# Patient Record
Sex: MALE | Race: WHITE | Employment: FULL TIME | URBAN - METROPOLITAN AREA
[De-identification: names, ages, dates, MRNs, and addresses within clinical notes are randomized per-mention and may not be internally consistent; named-entity substitution may affect disease eponyms.]

---

## 2017-02-03 ENCOUNTER — TELEPHONE (OUTPATIENT)
Dept: FAMILY MEDICINE | Facility: CLINIC | Age: 37
End: 2017-02-03

## 2017-02-03 NOTE — TELEPHONE ENCOUNTER
[facility] forms placed on Dr. Delgado desk for completion.    Fabi LE, Patient Care

## 2017-04-21 DIAGNOSIS — F41.1 GAD (GENERALIZED ANXIETY DISORDER): ICD-10-CM

## 2017-04-21 NOTE — TELEPHONE ENCOUNTER
Venlafaxine     Last Written Prescription Date: 04/21/2016  Last Fill Quantity: 90, # refills: 3  Last Office Visit with [facility], [facility] or [facility] prescribing provider: 10/18/2016        BP Readings from Last 3 Encounters:   10/18/16 (!) 150/100   04/21/16 136/86   12/18/15 128/88     Pulse: (for Fetzima)  Creatinine   Date Value Ref Range Status   02/29/2016 0.89 mg/dL Final   ]    Last PHQ-9 score on record=   PHQ-9 SCORE 4/21/2016   Total Score -   Total Score 3       Jody Xiong MA

## 2017-04-21 NOTE — LETTER
[facility] [place]  [address], [address]  [place] [address]  [phone]      April 27, 2017      Josué Franco  [address] [place] [address]        Dear Josué,    We have attempted to contact you by telephone without success.  We have sent a one time refill for the medication Effexor to the pharmacy for you.  In order to continue refilling this medication, we will need you to schedule an appointment for an office visit with your Provider.  You can call us at [phone] to schedule this appointment.        Sincerely,

## 2017-04-24 RX ORDER — VENLAFAXINE HYDROCHLORIDE 150 MG/1
CAPSULE, EXTENDED RELEASE ORAL
Qty: 30 CAPSULE | Refills: 0 | Status: SHIPPED | OUTPATIENT
Start: 2017-04-24 | End: 2017-05-25 | Stop reason: ALTCHOICE

## 2017-04-24 NOTE — TELEPHONE ENCOUNTER
Patient is out of medication and would like to be able to get this medication today. Please call patient at [phone].    Thank you Pily

## 2017-04-25 NOTE — TELEPHONE ENCOUNTER
Left message asking patient to return call.    Please inform patient of message from Provider below.   Please assist in scheduling patient.

## 2017-05-23 DIAGNOSIS — F41.1 GAD (GENERALIZED ANXIETY DISORDER): ICD-10-CM

## 2017-05-23 NOTE — PROGRESS NOTES
"  SUBJECTIVE:                                                    Josué Franco is a 36 year old male who presents to clinic today for the following health issues:      Anxiety Follow-Up    Status since last visit: \"about the same\" - states is well controlled.     Other associated symptoms:None    Complicating factors:   Significant life event: No   Current substance abuse: None  Depression symptoms: No  KRISTINA-7 SCORE 12/18/2015 12/18/2015 4/21/2016   Total Score - - -   Total Score 2 2 5        GAD7     Patient has taken quantipin in the past. He reports that he had strange side effects- he was having \"weird\" thoughts. ie \"wondering if anyone would care if he blew his brains out in the parking lot.\" He stopped taking it immediately. He has also taken Xanax but states that it did not work for him- made him tired. He has not used Ativan for a few weeks. He has not been using it a lot. He needs a refill today. He has not taken wellbutrin or Prozac. He has seen a psychiatrist in the past but reports that it has been awhile. Patient reports it is 2/10 right now.     Asthma Follow-Up    Was ACT completed today?  No      Respiratory symptoms:   Cough: No   Wheezing: No   Shortness of breath: No    Use of short- acting(rescue) inhaler: as needed     Taking controlled (daily) meds as prescribed: Yes    ER/UC visits or hospital admissions since last visit: [facility]- issue with asthma, prescribed albuterol    Recent asthma triggers that patient is dealing with: smoke and change of temperture        Amount of exercise or physical activity: 1 day/week for an average of 15-30 minutes    Problems taking medications regularly: No    Medication side effects: none    Diet: regular (no restrictions)    Patient is using his CPAP without issues.     Eye(s) Problem     Onset: 4 days ago     Description:   Location: right  Pain: no- \"sore\"   Redness: YES    Accompanying Signs & Symptoms:  Discharge/mattering: no "   Swelling: YES  Visual changes: no  Fever: no  Nasal Congestion: no  Bothered by bright lights: no     History:   Trauma: no   Foreign body exposure: no     Precipitating factors:   Wearing contacts: no, glasses      Alleviating factors:  Improved by: none        Therapies Tried and outcome: none     Patient has been wearing goggles at night while sleeping with his CPAP as recommended. He woke up 3 days ago with a very swollen rihgt eye. It has gotten better but is still swollen. When it first started he could barely see. Does not believe his left eye is affected. He has not used any ointments in the past few days.        Problem list and histories reviewed & adjusted, as indicated.  Additional history: as documented    Patient Active Problem List   Diagnosis     INTERMITTENT ASTHMA      Anxiety     CARDIOVASCULAR SCREENING; LDL GOAL LESS THAN 160     Oligospermia     Testicular hypofunction     KRISTINA (generalized anxiety disorder)     BMI over 45     Essential hypertension     Past Surgical History:   Procedure Laterality Date     C REMOVAL GALLBLADDER  1997     LITHOTRIPSY  7/08    Lithotrypsy     SURGICAL HISTORY OF -       Nissen Fundiplication       Social History   Substance Use Topics     Smoking status: Never Smoker     Smokeless tobacco: Never Used     Alcohol use Yes      Comment: Very moderate     Family History   Problem Relation Age of Onset     C.A.D. Father      DIABETES No family hx of      Hypertension No family hx of          Current Outpatient Prescriptions   Medication Sig Dispense Refill     albuterol (2.5 MG/3ML) 0.083% neb solution INHALE CONTENTS OF 1 VIAL VIA NEBULIZER EVERY 4 HOURS AS NEEDED FOR WHEEZING.  2     albuterol (PROAIR HFA/PROVENTIL HFA/VENTOLIN HFA) 108 (90 BASE) MCG/ACT Inhaler Inhale 2 puffs into the lungs       LORazepam (ATIVAN) 1 MG tablet Take 1 tablet (1 mg) by mouth daily as needed for anxiety 30 tablet 0     venlafaxine (EFFEXOR-XR) 75 MG 24 hr capsule 1 po QD for two  "weeks, then take every other day, then off. 30 capsule 0     FLUoxetine (PROZAC) 20 MG capsule 20 mg PO QD for 2 weeks, then increase to 40 mg PO QD 54 capsule 0     metoprolol (TOPROL-XL) 25 MG 24 hr tablet Take 1 tablet (25 mg) by mouth daily 30 tablet 1     tobramycin (TOBREX) 0.3 % ophthalmic solution Apply 1 drop to eye every 4 hours for 7 days 1 Bottle 0     albuterol (PROAIR HFA, PROVENTIL HFA, VENTOLIN HFA) 108 (90 BASE) MCG/ACT inhaler Inhale 2 puffs into the lungs every 4 hours as needed for shortness of breath / dyspnea 1 Inhaler 5     loratadine (CLARITIN) 10 MG tablet Take 10 mg by mouth       [DISCONTINUED] venlafaxine (EFFEXOR-XR) 150 MG 24 hr capsule TAKE ONE CAPSULE BY MOUTH EVERY DAY 30 capsule 0     Allergies   Allergen Reactions     No Known Allergies        Reviewed and updated as needed this visit by clinical staff  Tobacco  Allergies  Meds  Problems  Med Hx  Surg Hx  Fam Hx  Soc Hx        Reviewed and updated as needed this visit by Provider  Allergies  Meds  Problems         ROS:  Constitutional, HEENT, cardiovascular, pulmonary, gi and gu systems are negative, except as otherwise noted.    This document serves as a record of the services and decisions personally performed by CECILIO WARE. It was created on his/her behalf by Iris Zuñiga, a trained medical scribe. The creation of this document is based on the provider's statements to the medical scribe. Iris Zuñiga, May 25, 2017 9:40 AM  OBJECTIVE:                                                    BP (!) 130/108  Pulse 88  Temp 97.3  F (36.3  C) (Temporal)  Resp 16  Ht 1.88 m (6' 2\")  Wt (!) 167.8 kg (370 lb)  BMI 47.51 kg/m2  Body mass index is 47.51 kg/(m^2).  GENERAL APPEARANCE: healthy, alert and no distress  EYES: Eyes grossly normal to inspection, PERRL. Dryness and flaking on both upper and lower lids bilaterally, conjunctiva are injected rt > lt with yellow drainage and mattering noted bilaterally, no " significant lid swelling  HENT: ear canals and TM's normal and nose and mouth without ulcers or lesions  NECK: no adenopathy, no asymmetry, masses, or scars and thyroid normal to palpation  RESP: lungs clear to auscultation - no rales, rhonchi or wheezes  CV: regular rates and rhythm, normal S1 S2, no S3 or S4 and no murmur, click or rub  LYMPHATICS: normal ant/post cervical and supraclavicular nodes  NEURO: Normal strength and tone, mentation intact and speech normal  PSYCH: mentation appears normal and affect normal/bright    Diagnostic test results:  Diagnostic Test Results:  none      ASSESSMENT/PLAN:                                                        ICD-10-CM    1. KRISTINA (generalized anxiety disorder) F41.1 venlafaxine (EFFEXOR-XR) 75 MG 24 hr capsule     FLUoxetine (PROZAC) 20 MG capsule   2. Anxiety F41.9 LORazepam (ATIVAN) 1 MG tablet     venlafaxine (EFFEXOR-XR) 75 MG 24 hr capsule     FLUoxetine (PROZAC) 20 MG capsule   3. Essential hypertension I10 metoprolol (TOPROL-XL) 25 MG 24 hr tablet     Basic metabolic panel   4. Lipid screening Z13.220 Lipid panel reflex to direct LDL   5. Acute conjunctivitis of both eyes, unspecified acute conjunctivitis type H10.33 tobramycin (TOBREX) 0.3 % ophthalmic solution       Taper off Prozac while tapering off Effexor    Taper off Effexor for 2 weeks    Continue taking Ativan    Return to the clinic if suicidal thoughts return    Blood pressure was high today    Start on blood pressure medication, Toprol    Follow up in 3 weeks to recheck    Use hydrocortisone around your eyes to help with irritation    Use eye drops-tobrex- bilaterally until swelling resolves , avoid rubbing eyes as lielky cause of infection.     Continue using inhaler as needed    Follow up with Provider - 3 weeks to recheck blood pressure       The information in this document, created by the medical scrmario Zuñiga for me, accurately reflects the services I personally performed and the  decisions made by me. I have reviewed and approved this document for accuracy prior to leaving the patient care area.    LAWSON Hernandez [facility]

## 2017-05-23 NOTE — TELEPHONE ENCOUNTER
VENLAFAXINE     Last Written Prescription Date: 4-24-17  Last Fill Quantity: 30, # refills: 0  [place] [facility] [place]:  10-28-16   Next 5 appointments (look out 90 days)     May 25, 2017  9:20 AM CDT   Office Visit with LAWSON Hicks CNP   [facility] [place] [facility]    [address], [address]  [facility] [id number] [phone]                   Last PHQ-9 score on record=   PHQ-9 SCORE 4/21/2016   Total Score -   Total Score 3

## 2017-05-24 RX ORDER — VENLAFAXINE HYDROCHLORIDE 150 MG/1
150 CAPSULE, EXTENDED RELEASE ORAL DAILY
Qty: 30 CAPSULE | Refills: 0 | OUTPATIENT
Start: 2017-05-24

## 2017-05-25 ENCOUNTER — OFFICE VISIT (OUTPATIENT)
Dept: FAMILY MEDICINE | Facility: CLINIC | Age: 37
End: 2017-05-25
Payer: COMMERCIAL

## 2017-05-25 ENCOUNTER — TELEPHONE (OUTPATIENT)
Dept: FAMILY MEDICINE | Facility: CLINIC | Age: 37
End: 2017-05-25

## 2017-05-25 VITALS
DIASTOLIC BLOOD PRESSURE: 104 MMHG | TEMPERATURE: 97.3 F | BODY MASS INDEX: 40.43 KG/M2 | SYSTOLIC BLOOD PRESSURE: 130 MMHG | HEIGHT: 74 IN | WEIGHT: 315 LBS | HEART RATE: 84 BPM | RESPIRATION RATE: 16 BRPM

## 2017-05-25 DIAGNOSIS — F41.9 ANXIETY: ICD-10-CM

## 2017-05-25 DIAGNOSIS — Z13.220 LIPID SCREENING: ICD-10-CM

## 2017-05-25 DIAGNOSIS — F41.1 GAD (GENERALIZED ANXIETY DISORDER): Primary | ICD-10-CM

## 2017-05-25 DIAGNOSIS — H10.33 ACUTE CONJUNCTIVITIS OF BOTH EYES, UNSPECIFIED ACUTE CONJUNCTIVITIS TYPE: ICD-10-CM

## 2017-05-25 DIAGNOSIS — I10 ESSENTIAL HYPERTENSION: ICD-10-CM

## 2017-05-25 LAB
ANION GAP SERPL CALCULATED.3IONS-SCNC: 10 MMOL/L (ref 3–14)
BUN SERPL-MCNC: 10 MG/DL (ref 7–30)
CALCIUM SERPL-MCNC: 9 MG/DL (ref 8.5–10.1)
CHLORIDE SERPL-SCNC: 104 MMOL/L (ref 94–109)
CHOLEST SERPL-MCNC: 145 MG/DL
CO2 SERPL-SCNC: 25 MMOL/L (ref 20–32)
CREAT SERPL-MCNC: 0.72 MG/DL (ref 0.66–1.25)
GFR SERPL CREATININE-BSD FRML MDRD: ABNORMAL ML/MIN/1.7M2
GLUCOSE SERPL-MCNC: 151 MG/DL (ref 70–99)
HDLC SERPL-MCNC: 41 MG/DL
LDLC SERPL CALC-MCNC: 74 MG/DL
NONHDLC SERPL-MCNC: 104 MG/DL
POTASSIUM SERPL-SCNC: 3.9 MMOL/L (ref 3.4–5.3)
SODIUM SERPL-SCNC: 139 MMOL/L (ref 133–144)
TRIGL SERPL-MCNC: 148 MG/DL

## 2017-05-25 PROCEDURE — 36415 COLL VENOUS BLD VENIPUNCTURE: CPT | Performed by: NURSE PRACTITIONER

## 2017-05-25 PROCEDURE — 80048 BASIC METABOLIC PNL TOTAL CA: CPT | Performed by: NURSE PRACTITIONER

## 2017-05-25 PROCEDURE — 80061 LIPID PANEL: CPT | Performed by: NURSE PRACTITIONER

## 2017-05-25 PROCEDURE — 99214 OFFICE O/P EST MOD 30 MIN: CPT | Performed by: NURSE PRACTITIONER

## 2017-05-25 RX ORDER — METOPROLOL SUCCINATE 25 MG/1
25 TABLET, EXTENDED RELEASE ORAL DAILY
Qty: 30 TABLET | Refills: 1 | Status: SHIPPED | OUTPATIENT
Start: 2017-05-25 | End: 2017-06-30

## 2017-05-25 RX ORDER — ALBUTEROL SULFATE 0.83 MG/ML
SOLUTION RESPIRATORY (INHALATION)
Refills: 2 | COMMUNITY
Start: 2017-03-19 | End: 2019-10-24

## 2017-05-25 RX ORDER — LORAZEPAM 1 MG/1
1 TABLET ORAL DAILY PRN
Qty: 30 TABLET | Refills: 0 | Status: SHIPPED | OUTPATIENT
Start: 2017-05-25 | End: 2017-11-17

## 2017-05-25 RX ORDER — LORATADINE 10 MG/1
10 TABLET ORAL DAILY PRN
COMMUNITY
End: 2021-11-04

## 2017-05-25 RX ORDER — TOBRAMYCIN 3 MG/ML
1 SOLUTION/ DROPS OPHTHALMIC EVERY 4 HOURS
Qty: 1 BOTTLE | Refills: 0 | Status: SHIPPED | OUTPATIENT
Start: 2017-05-25 | End: 2017-06-01

## 2017-05-25 RX ORDER — VENLAFAXINE HYDROCHLORIDE 150 MG/1
150 CAPSULE, EXTENDED RELEASE ORAL DAILY
Qty: 30 CAPSULE | Refills: 0 | Status: CANCELLED | OUTPATIENT
Start: 2017-05-25

## 2017-05-25 RX ORDER — VENLAFAXINE HYDROCHLORIDE 75 MG/1
CAPSULE, EXTENDED RELEASE ORAL
Qty: 30 CAPSULE | Refills: 0 | Status: SHIPPED | OUTPATIENT
Start: 2017-05-25 | End: 2017-06-30

## 2017-05-25 RX ORDER — ALBUTEROL SULFATE 90 UG/1
2 AEROSOL, METERED RESPIRATORY (INHALATION)
COMMUNITY
Start: 2017-03-19 | End: 2017-07-31

## 2017-05-25 ASSESSMENT — ANXIETY QUESTIONNAIRES
7. FEELING AFRAID AS IF SOMETHING AWFUL MIGHT HAPPEN: NOT AT ALL
5. BEING SO RESTLESS THAT IT IS HARD TO SIT STILL: NOT AT ALL
6. BECOMING EASILY ANNOYED OR IRRITABLE: NOT AT ALL
IF YOU CHECKED OFF ANY PROBLEMS ON THIS QUESTIONNAIRE, HOW DIFFICULT HAVE THESE PROBLEMS MADE IT FOR YOU TO DO YOUR WORK, TAKE CARE OF THINGS AT HOME, OR GET ALONG WITH OTHER PEOPLE: NOT DIFFICULT AT ALL
3. WORRYING TOO MUCH ABOUT DIFFERENT THINGS: SEVERAL DAYS
GAD7 TOTAL SCORE: 2
1. FEELING NERVOUS, ANXIOUS, OR ON EDGE: SEVERAL DAYS
2. NOT BEING ABLE TO STOP OR CONTROL WORRYING: NOT AT ALL

## 2017-05-25 ASSESSMENT — PAIN SCALES - GENERAL: PAINLEVEL: NO PAIN (1)

## 2017-05-25 ASSESSMENT — PATIENT HEALTH QUESTIONNAIRE - PHQ9: 5. POOR APPETITE OR OVEREATING: NOT AT ALL

## 2017-05-25 NOTE — MR AVS SNAPSHOT
After Visit Summary   5/25/2017    Josué rFanco    MRN: [id number]           Patient Information     Date Of Birth          1980        Visit Information        Provider Department      5/25/2017 9:20 AM Belkis Chadwick APRN CNP [facility] [place]        Today's Diagnoses     KRISTINA (generalized anxiety disorder)    -  1    Anxiety        Essential hypertension        Lipid screening        Acute conjunctivitis of both eyes, unspecified acute conjunctivitis type          Care Instructions    Taper off Effexor- continue taking for 2 weeks, then decrease to every other day, then stop taking completely  Taper on Prozac- begin taking 20 mg and work up to 40 mg  Continue taking Ativan.   Begin taking blood pressure medication- Toprol. Return to check blood pressure in 3 weeks.     All medications can be taken at the same time in the morning.     Use Tobrex eye drops in both eyes until swelling, irritation resolve. Call or return if infection has not improved.           Follow-ups after your visit        Who to contact     If you have questions or need follow up information about today's clinic visit or your schedule please contact [facility] [place] directly at [phone].  Normal or non-critical lab and imaging results will be communicated to you by [employer], letter or phone within 4 business days after the clinic has received the results. If you do not hear from us within 7 days, please contact the clinic through [employer] or phone. If you have a critical or abnormal lab result, we will notify you by phone as soon as possible.  Submit refill requests through [employer] or call your pharmacy and they will forward the refill request to us. Please allow 3 business days for your refill to be completed.          Additional Information About Your Visit        MyChart Information     [employer] lets you send messages to your doctor, view your test results, renew your prescriptions, schedule appointments and  "more. To sign up, go to [email or web address] . Click on \"Log in\" on the left side of the screen, which will take you to the Welcome page. Then click on \"Sign up Now\" on the right side of the page.     You will be asked to enter the access code listed below, as well as some personal information. Please follow the directions to create your username and password.     Your access code is: [email or web address]  Expires: 2017 10:44 AM     Your access code will  in 90 days. If you need help or a new code, please call your [facility] clinic or [phone].        Care EveryWhere ID     This is your Care EveryWhere ID. This could be used by other organizations to access your [facility] medical records  [id number]        Your Vitals Were     Pulse Temperature Respirations Height BMI (Body Mass Index)       88 97.3  F (36.3  C) (Temporal) 16 6' 2\" (1.88 m) 47.51 kg/m2        Blood Pressure from Last 3 Encounters:   17 (!) 130/108   10/18/16 (!) 150/100   16 136/86    Weight from Last 3 Encounters:   17 (!) 370 lb (167.8 kg)   10/18/16 (!) 367 lb 11.2 oz (166.8 kg)   16 (!) 357 lb 8 oz (162.2 kg)              We Performed the Following     Basic metabolic panel     Lipid panel reflex to direct LDL          Today's Medication Changes          These changes are accurate as of: 17 10:44 AM.  If you have any questions, ask your nurse or doctor.               Start taking these medicines.        Dose/Directions    FLUoxetine 20 MG capsule   Commonly known as:  PROzac   Used for:  KRISTINA (generalized anxiety disorder), Anxiety   Started by:  Belkis Chadwick APRN CNP        20 mg PO QD for 2 weeks, then increase to 40 mg PO QD   Quantity:  54 capsule   Refills:  0       metoprolol 25 MG 24 hr tablet   Commonly known as:  TOPROL-XL   Used for:  Essential hypertension   Started by:  Belkis Chadwick APRN CNP        Dose:  25 mg   Take 1 tablet (25 mg) by mouth daily   Quantity:  30 tablet   Refills:  1       " tobramycin 0.3 % ophthalmic solution   Commonly known as:  TOBREX   Used for:  Acute conjunctivitis of both eyes, unspecified acute conjunctivitis type   Started by:  Belkis Chadwick APRN CNP        Dose:  1 drop   Apply 1 drop to eye every 4 hours for 7 days   Quantity:  1 Bottle   Refills:  0         These medicines have changed or have updated prescriptions.        Dose/Directions    venlafaxine 75 MG 24 hr capsule   Commonly known as:  EFFEXOR-XR   This may have changed:  See the new instructions.   Used for:  KRISTINA (generalized anxiety disorder), Anxiety   Changed by:  Belkis Chadwick APRN CNP        1 po QD for two weeks, then take every other day, then off.   Quantity:  30 capsule   Refills:  0            Where to get your medicines      These medications were sent to [place] [address] [place] [facility] - [place] - [address]  [address] [address]     Phone:  [phone]     FLUoxetine 20 MG capsule    metoprolol 25 MG 24 hr tablet    tobramycin 0.3 % ophthalmic solution    venlafaxine 75 MG 24 hr capsule         Some of these will need a paper prescription and others can be bought over the counter.  Ask your nurse if you have questions.     Bring a paper prescription for each of these medications     LORazepam 1 MG tablet                Primary Care Provider Office Phone # Fax #    LAWSON Hicks [phone]       [facility] [address] [address] [address]        Thank you!     Thank you for choosing [facility]  for your care. Our goal is always to provide you with excellent care. Hearing back from our patients is one way we can continue to improve our services. Please take a few minutes to complete the written survey that you may receive in the mail after your visit with us. Thank you!             Your Updated Medication List - Protect others around you: Learn how to safely use, store and throw away your medicines at www.disposemymeds.org.           This list is accurate as of: 5/25/17 10:44 AM.  Always use your most recent med list.                   Brand Name Dispense Instructions for use    * albuterol 108 (90 BASE) MCG/ACT Inhaler    PROAIR HFA/PROVENTIL HFA/VENTOLIN HFA    1 Inhaler    Inhale 2 puffs into the lungs every 4 hours as needed for shortness of breath / dyspnea       * albuterol (2.5 MG/3ML) 0.083% neb solution      INHALE CONTENTS OF 1 VIAL VIA NEBULIZER EVERY 4 HOURS AS NEEDED FOR WHEEZING.       * albuterol 108 (90 BASE) MCG/ACT Inhaler    PROAIR HFA/PROVENTIL HFA/VENTOLIN HFA     Inhale 2 puffs into the lungs       FLUoxetine 20 MG capsule    PROzac    54 capsule    20 mg PO QD for 2 weeks, then increase to 40 mg PO QD       loratadine 10 MG tablet    CLARITIN     Take 10 mg by mouth       LORazepam 1 MG tablet    ATIVAN    30 tablet    Take 1 tablet (1 mg) by mouth daily as needed for anxiety       metoprolol 25 MG 24 hr tablet    TOPROL-XL    30 tablet    Take 1 tablet (25 mg) by mouth daily       tobramycin 0.3 % ophthalmic solution    TOBREX    1 Bottle    Apply 1 drop to eye every 4 hours for 7 days       venlafaxine 75 MG 24 hr capsule    EFFEXOR-XR    30 capsule    1 po QD for two weeks, then take every other day, then off.       * Notice:  This list has 3 medication(s) that are the same as other medications prescribed for you. Read the directions carefully, and ask your doctor or other care provider to review them with you.

## 2017-05-25 NOTE — LETTER
[facility] [place]  [address]  [place] [address]  [phone]      June 1, 2017      Josué Franco  [address] [place] [address]  [place] [address]        Dear Josué,    Lab Results   Component Value Date     05/25/2017     04/21/2016     02/29/2016     03/19/2012    GLC 92 09/04/2009      Your glucose levels are showing elevation. I am concerned you have now developed diabetes. Please make a follow-up exam appointment to further investigate this in the next 2 weeks. If left untreated this can have severe consequences, and we have made two attempts to reach you without success.   Please call to schedule, [phone]      Sincerely,          Belkis Chadwick DNP

## 2017-05-25 NOTE — NURSING NOTE
"Chief Complaint   Patient presents with     Eye Problem     Anxiety     Panel Management     MyChart, PHQ-9/KRISTINA, ACT       Initial BP (!) 130/108  Pulse 88  Temp 97.3  F (36.3  C) (Temporal)  Resp 16  Ht 6' 2\" (1.88 m)  Wt (!) 370 lb (167.8 kg)  BMI 47.51 kg/m2 Estimated body mass index is 47.51 kg/(m^2) as calculated from the following:    Height as of this encounter: 6' 2\" (1.88 m).    Weight as of this encounter: 370 lb (167.8 kg).  Medication Reconciliation: complete       David Arreola MA    "

## 2017-05-25 NOTE — TELEPHONE ENCOUNTER
LM- asking for return call.   Want to discuss glucose- elevated in Diabetic range if full fasting. Belkis Chadwick

## 2017-05-25 NOTE — PATIENT INSTRUCTIONS
Taper off Effexor- continue taking for 2 weeks, then decrease to every other day, then stop taking completely  Taper on Prozac- begin taking 20 mg and work up to 40 mg  Continue taking Ativan.   Begin taking blood pressure medication- Toprol. Return to check blood pressure in 3 weeks.     All medications can be taken at the same time in the morning.     Use Tobrex eye drops in both eyes until swelling, irritation resolve. Call or return if infection has not improved.

## 2017-05-26 ASSESSMENT — PATIENT HEALTH QUESTIONNAIRE - PHQ9: SUM OF ALL RESPONSES TO PHQ QUESTIONS 1-9: 3

## 2017-05-26 ASSESSMENT — ANXIETY QUESTIONNAIRES: GAD7 TOTAL SCORE: 2

## 2017-05-26 ASSESSMENT — ASTHMA QUESTIONNAIRES: ACT_TOTALSCORE: 23

## 2017-06-08 ENCOUNTER — TELEPHONE (OUTPATIENT)
Dept: FAMILY MEDICINE | Facility: CLINIC | Age: 37
End: 2017-06-08

## 2017-06-08 NOTE — TELEPHONE ENCOUNTER
Summary:    Patient is due/failing the following:   BP CHECK    Action needed:   Patient needs nurse only appointment.    Type of outreach:    Phone, left message for patient to call back.     Questions for provider review:    None                                                                                                                                    Oly Rowan       Chart routed to Care Team .      Panel Management Review      Patient has the following on his problem list:     Asthma review     ACT Total Scores 5/25/2017   ACT TOTAL SCORE (Goal Greater than or Equal to 20) 23   In the past 12 months, how many times did you visit the emergency room for your asthma without being admitted to the hospital? 0   In the past 12 months, how many times were you hospitalized overnight because of your asthma? 0      1. Is Asthma diagnosis on the Problem List? Yes    2. Is Asthma listed on Health Maintenance? Yes    3. Patient is due for:  none    Hypertension   Last three blood pressure readings:  BP Readings from Last 3 Encounters:   05/25/17 (!) 130/104   10/18/16 (!) 150/100   04/21/16 136/86     Blood pressure: FAILED    HTN Guidelines:  Age 18-59 BP range:  Less than 140/90  Age 60-85 with Diabetes:  Less than 140/90  Age 60-85 without Diabetes:  less than 150/90      Composite cancer screening  Chart review shows that this patient is due/due soon for the following None

## 2017-06-08 NOTE — LETTER
[facility]  [address], [address]  [place] [address]  Phone: [phone]  Fax: [phone]  June 26, 2017      Josué Franco  [address] [address] [place] [place] [address] [address]      Dear Josué,    We care about your health and have reviewed your health plan including your medical conditions, medications, and lab results.  Based on this review, it is recommended that you follow up regarding the following health topic(s):  -High Blood Pressure    We recommend you take the following action(s):  -schedule a FREE FLOAT MA-ONLY BLOOD PRESSURE APPOINTMENT within the next 1-4 weeks.     Please call us at the [facility] - [phone] (or use [employer]) to address the above recommendations.     Thank you for trusting [facility] and we appreciate the opportunity to serve you.  We look forward to supporting your healthcare needs in the future.    Healthy Regards,    Your Health Care Team  [facility]

## 2017-06-27 ENCOUNTER — TRANSFERRED RECORDS (OUTPATIENT)
Dept: HEALTH INFORMATION MANAGEMENT | Facility: CLINIC | Age: 37
End: 2017-06-27

## 2017-06-27 NOTE — PROGRESS NOTES
SUBJECTIVE:                                                    Josué Franco is a 37 year old male who presents to clinic today for the following health issues:    Hypertension Follow-up      Outpatient blood pressures are being checked at home and work.  Results are 130-140/95.    Low Salt Diet: not monitoring salt    Anxiety Follow-Up    Status since last visit: Improved     Other associated symptoms:None    Complicating factors:   Significant life event: No   Current substance abuse: None  Depression symptoms: No  KRISTINA-7 SCORE 4/21/2016 5/25/2017 6/30/2017   Total Score - - -   Total Score - - 0 (minimal anxiety)   Total Score 5 2 0       GAD7      Asthma Follow-Up    Was ACT completed today?  No      Respiratory symptoms:   Cough: No   Wheezing: No   Shortness of breath: No    Use of short- acting(rescue) inhaler: no    Taking controlled (daily) meds as prescribed: Yes    ER/UC visits or hospital admissions since last visit: none     Recent asthma triggers that patient is dealing with: None       Amount of exercise or physical activity: 1-2 days a week    Problems taking medications regularly: No    Medication side effects: none    Diet: Trying to eat better      The patient states his breathing is well and his anxiety has improved. He notes his anxiety is around a 1 currently. The patient states he is taking 40MG of fluoxetine daily and needs a refill. He states he is taking lorazepam infrequently.     He states he is sleeping well but feels he needs a check in for pressure with his CPAP. The patient denies dizziness or side effects of current medications.     The patient reports he has been following a healthy diet and beginning exercise. He denies chest pain or shortness of breath. He notes his exercise is walking and biking. The patient states his clothes are fitting him differently, but he has not lost much weight.     Problem list and histories reviewed & adjusted, as indicated.  Additional history: as  documented    Patient Active Problem List   Diagnosis     INTERMITTENT ASTHMA      Anxiety     CARDIOVASCULAR SCREENING; LDL GOAL LESS THAN 160     Oligospermia     Testicular hypofunction     KRISTINA (generalized anxiety disorder)     BMI over 45     Essential hypertension     ADRIANNA (obstructive sleep apnea)     Past Surgical History:   Procedure Laterality Date     HC REMOVAL GALLBLADDER  1997     LITHOTRIPSY  7/08    Lithotrypsy     SURGICAL HISTORY OF -       Nissen Fundiplication       Social History   Substance Use Topics     Smoking status: Never Smoker     Smokeless tobacco: Never Used     Alcohol use Yes      Comment: Very moderate     Family History   Problem Relation Age of Onset     C.A.D. Father      DIABETES No family hx of      Hypertension No family hx of          Current Outpatient Prescriptions   Medication Sig Dispense Refill     FLUoxetine (PROZAC) 40 MG capsule Take 1 capsule (40 mg) by mouth daily 90 capsule 1     amLODIPine (NORVASC) 5 MG tablet Take 1 tablet (5 mg) by mouth daily 30 tablet 1     metoprolol (TOPROL-XL) 25 MG 24 hr tablet Take 1 tablet (25 mg) by mouth daily 90 tablet 1     albuterol (2.5 MG/3ML) 0.083% neb solution INHALE CONTENTS OF 1 VIAL VIA NEBULIZER EVERY 4 HOURS AS NEEDED FOR WHEEZING.  2     albuterol (PROAIR HFA/PROVENTIL HFA/VENTOLIN HFA) 108 (90 BASE) MCG/ACT Inhaler Inhale 2 puffs into the lungs       loratadine (CLARITIN) 10 MG tablet Take 10 mg by mouth       LORazepam (ATIVAN) 1 MG tablet Take 1 tablet (1 mg) by mouth daily as needed for anxiety 30 tablet 0     albuterol (PROAIR HFA, PROVENTIL HFA, VENTOLIN HFA) 108 (90 BASE) MCG/ACT inhaler Inhale 2 puffs into the lungs every 4 hours as needed for shortness of breath / dyspnea 1 Inhaler 5     [DISCONTINUED] FLUoxetine (PROZAC) 20 MG capsule 20 mg PO QD for 2 weeks, then increase to 40 mg PO QD 54 capsule 0     [DISCONTINUED] metoprolol (TOPROL-XL) 25 MG 24 hr tablet Take 1 tablet (25 mg) by mouth daily 30 tablet 1  "      Reviewed and updated as needed this visit by clinical staff  Tobacco  Allergies  Meds  Problems  Med Hx  Surg Hx  Fam Hx  Soc Hx        Reviewed and updated as needed this visit by Provider  Allergies  Meds  Problems         ROS:  Constitutional, neuro, ENT, endocrine, pulmonary, cardiac, gastrointestinal, genitourinary, musculoskeletal, integument and psychiatric systems are negative, except as otherwise noted.    This document serves as a record of the services and decisions personally performed and made by Belkis Chadwick DNP. It was created on her behalf by Asiya Urbina, a trained medical scribe. The creation of this document is based the provider's statements to the medical scribe.  Asiya Urbina 10:32 AM June 30, 2017      OBJECTIVE:                                                    /90  Pulse 78  Temp 97.3  F (36.3  C) (Temporal)  Resp 16  Ht 6' 1.75\" (1.873 m)  Wt (!) 370 lb 14.4 oz (168.2 kg)  BMI 47.94 kg/m2  Body mass index is 47.94 kg/(m^2).  GENERAL APPEARANCE: obese, alert and no distress  HENT: nose and mouth without ulcers or lesions. Small oropharynx  NECK: no adenopathy, no asymmetry, masses, or scars and thyroid normal to palpation  RESP: lungs clear to auscultation - no rales, rhonchi or wheezes  CV: regular rates and rhythm, normal S1 S2, no S3 or S4 and no murmur, click or rub  SKIN: no suspicious lesions or rashes  NEURO: Normal strength and tone, mentation intact and speech normal  PSYCH: mentation appears normal and affect normal/bright    Diagnostic test results:  No results found for this or any previous visit (from the past 24 hour(s)).     ASSESSMENT/PLAN:                                                        ICD-10-CM    1. Essential hypertension I10 amLODIPine (NORVASC) 5 MG tablet     **Basic metabolic panel FUTURE 2mo     metoprolol (TOPROL-XL) 25 MG 24 hr tablet   2. Intermittent asthma, uncomplicated J45.20 Asthma Action Plan (AAP)   3. KRISTINA (generalized " anxiety disorder) F41.1 FLUoxetine (PROZAC) 40 MG capsule   4. ADRIANNA (obstructive sleep apnea) G47.33 SLEEP EVALUATION & MANAGEMENT REFERRAL - ADULT         Discussed hypertension, asthma, anxiety, weight management, and sleep apnea. Referral placed for sleep evaluation and management. Blood pressure rechecked. Decision made to add second medication. Order placed for amlodipine in conjunction with metoprolol. Medication direction, dosage, and side effects discussed with patient. Anxiety controlled, decision to continue with current dose of fluoxetine. Order placed for refill of fluoxetine. Medication direction, dosage, and side effects discussed with patient.    Follow up with Provider - 6 months    Follow up with: Labs and blood pressure check in 2 weeks unless having side effects     LAWSON Hernandez CNP  [facility]    The information in this document, created by a scribe for me, accurately reflects the services I personally performed and the decisions made by me. I have reviewed and approved this document for accuracy. LAWSON Dasilva, CNP, DNP.

## 2017-06-30 ENCOUNTER — OFFICE VISIT (OUTPATIENT)
Dept: FAMILY MEDICINE | Facility: CLINIC | Age: 37
End: 2017-06-30
Payer: COMMERCIAL

## 2017-06-30 VITALS
HEIGHT: 74 IN | WEIGHT: 315 LBS | HEART RATE: 78 BPM | RESPIRATION RATE: 16 BRPM | TEMPERATURE: 97.3 F | BODY MASS INDEX: 40.43 KG/M2 | SYSTOLIC BLOOD PRESSURE: 132 MMHG | DIASTOLIC BLOOD PRESSURE: 90 MMHG

## 2017-06-30 DIAGNOSIS — F41.1 GAD (GENERALIZED ANXIETY DISORDER): ICD-10-CM

## 2017-06-30 DIAGNOSIS — J45.20 INTERMITTENT ASTHMA, UNCOMPLICATED: ICD-10-CM

## 2017-06-30 DIAGNOSIS — G47.33 OSA (OBSTRUCTIVE SLEEP APNEA): ICD-10-CM

## 2017-06-30 DIAGNOSIS — I10 ESSENTIAL HYPERTENSION: Primary | ICD-10-CM

## 2017-06-30 PROCEDURE — 99214 OFFICE O/P EST MOD 30 MIN: CPT | Performed by: NURSE PRACTITIONER

## 2017-06-30 RX ORDER — FLUOXETINE 40 MG/1
40 CAPSULE ORAL DAILY
Qty: 90 CAPSULE | Refills: 1 | Status: SHIPPED | OUTPATIENT
Start: 2017-06-30 | End: 2017-11-17 | Stop reason: DRUGHIGH

## 2017-06-30 RX ORDER — AMLODIPINE BESYLATE 5 MG/1
5 TABLET ORAL DAILY
Qty: 30 TABLET | Refills: 1 | Status: SHIPPED | OUTPATIENT
Start: 2017-06-30 | End: 2017-11-17

## 2017-06-30 RX ORDER — METOPROLOL SUCCINATE 25 MG/1
25 TABLET, EXTENDED RELEASE ORAL DAILY
Qty: 90 TABLET | Refills: 1 | Status: SHIPPED | OUTPATIENT
Start: 2017-06-30 | End: 2017-11-17 | Stop reason: SINTOL

## 2017-06-30 ASSESSMENT — ANXIETY QUESTIONNAIRES
4. TROUBLE RELAXING: NOT AT ALL
GAD7 TOTAL SCORE: 0
GAD7 TOTAL SCORE: 0
6. BECOMING EASILY ANNOYED OR IRRITABLE: NOT AT ALL
1. FEELING NERVOUS, ANXIOUS, OR ON EDGE: NOT AT ALL
7. FEELING AFRAID AS IF SOMETHING AWFUL MIGHT HAPPEN: NOT AT ALL
2. NOT BEING ABLE TO STOP OR CONTROL WORRYING: NOT AT ALL
5. BEING SO RESTLESS THAT IT IS HARD TO SIT STILL: NOT AT ALL
3. WORRYING TOO MUCH ABOUT DIFFERENT THINGS: NOT AT ALL
GAD7 TOTAL SCORE: 0
7. FEELING AFRAID AS IF SOMETHING AWFUL MIGHT HAPPEN: NOT AT ALL

## 2017-06-30 ASSESSMENT — PATIENT HEALTH QUESTIONNAIRE - PHQ9
SUM OF ALL RESPONSES TO PHQ QUESTIONS 1-9: 2
10. IF YOU CHECKED OFF ANY PROBLEMS, HOW DIFFICULT HAVE THESE PROBLEMS MADE IT FOR YOU TO DO YOUR WORK, TAKE CARE OF THINGS AT HOME, OR GET ALONG WITH OTHER PEOPLE: NOT DIFFICULT AT ALL
SUM OF ALL RESPONSES TO PHQ QUESTIONS 1-9: 2

## 2017-06-30 NOTE — MR AVS SNAPSHOT
After Visit Summary   6/30/2017    Josué Franco    MRN: [id number]           Patient Information     Date Of Birth          1980        Visit Information        Provider Department      6/30/2017 10:00 AM Belkis Chadwick APRN CNP [facility]        Today's Diagnoses     Essential hypertension    -  1    Intermittent asthma, uncomplicated        KRISTINA (generalized anxiety disorder)        ADRIANNA (obstructive sleep apnea)           Follow-ups after your visit        Additional Services     SLEEP EVALUATION & MANAGEMENT REFERRAL - ADULT       Please be aware that coverage of these services is subject to the terms and limitations of your health insurance plan.  Call member services at your health plan with any benefit or coverage questions.      Please bring the following to your appointment:    >>   List of current medications   >>   This referral request   >>   Any documents/labs given to you for this referral    [facility] [phone] (Age 15 and up)                  Future tests that were ordered for you today     Open Future Orders        Priority Expected Expires Ordered    **Basic metabolic panel FUTURE 2mo Routine 8/29/2017 10/28/2017 6/30/2017    SLEEP EVALUATION & MANAGEMENT REFERRAL - ADULT Routine  6/30/2018 6/30/2017            Who to contact     If you have questions or need follow up information about today's clinic visit or your schedule please contact [facility] directly at [phone].  Normal or non-critical lab and imaging results will be communicated to you by MyChart, letter or phone within 4 business days after the clinic has received the results. If you do not hear from us within 7 days, please contact the clinic through MyChart or phone. If you have a critical or abnormal lab result, we will notify you by phone as soon as possible.  Submit refill requests through [employer] or call your pharmacy and they will forward the refill  "request to us. Please allow 3 business days for your refill to be completed.          Additional Information About Your Visit        [employer] Information     [employer] lets you send messages to your doctor, view your test results, renew your prescriptions, schedule appointments and more. To sign up, go to [email or web address] . Click on \"Log in\" on the left side of the screen, which will take you to the Welcome page. Then click on \"Sign up Now\" on the right side of the page.     You will be asked to enter the access code listed below, as well as some personal information. Please follow the directions to create your username and password.     Your access code is: [email or web address]  Expires: 2017 10:44 AM     Your access code will  in 90 days. If you need help or a new code, please call your [place] clinic or [phone].        Care EveryWhere ID     This is your Care EveryWhere ID. This could be used by other organizations to access your [place] medical records  [id number]        Your Vitals Were     Pulse Temperature Respirations Height BMI (Body Mass Index)       78 97.3  F (36.3  C) (Temporal) 16 6' 1.75\" (1.873 m) 47.94 kg/m2        Blood Pressure from Last 3 Encounters:   17 132/90   17 (!) 130/104   10/18/16 (!) 150/100    Weight from Last 3 Encounters:   17 (!) 370 lb 14.4 oz (168.2 kg)   17 (!) 370 lb (167.8 kg)   10/18/16 (!) 367 lb 11.2 oz (166.8 kg)              We Performed the Following     Asthma Action Plan (AAP)          Today's Medication Changes          These changes are accurate as of: 17 10:30 AM.  If you have any questions, ask your nurse or doctor.               Start taking these medicines.        Dose/Directions    amLODIPine 5 MG tablet   Commonly known as:  NORVASC   Used for:  Essential hypertension   Started by:  Belkis Chadwick APRN CNP        Dose:  5 mg   Take 1 tablet (5 mg) by mouth daily   Quantity:  30 tablet   Refills:  1       FLUoxetine 40 " MG capsule   Commonly known as:  PROzac   Used for:  KRISTINA (generalized anxiety disorder)   Started by:  Belkis Chadwick APRN CNP        Dose:  40 mg   Take 1 capsule (40 mg) by mouth daily   Quantity:  90 capsule   Refills:  1         Stop taking these medicines if you haven't already. Please contact your care team if you have questions.     venlafaxine 75 MG 24 hr capsule   Commonly known as:  EFFEXOR-XR   Stopped by:  Belkis Chadwick APRN CNP                Where to get your medicines      These medications were sent to [place] [address] [facility] [place] [place] - [address]  [address], [place] [address]     Phone:  [phone]     amLODIPine 5 MG tablet    FLUoxetine 40 MG capsule                Primary Care Provider Office Phone # Fax #    LAWSON Hicks [phone]       [facility] [address] [facility] [address]        Equal Access to Services     [facility] Hospital[facility]INE : Hadii aad ku hadasho Soomaali, waaxda luqadaha, qaybta kaalmada adeegyada, waxay idiin hayaan dolores ross . So [facility] [phone].    ATENCIÓN: Si holgerla español, tiene a gaxiola disposición servicios gratuitos de asistencia lingüística. Llame al [phone].    We comply with applicable federal civil rights laws and [place] laws. We do not discriminate on the basis of race, color, national origin, age, disability sex, sexual orientation or gender identity.            Thank you!     Thank you for choosing [facility]  for your care. Our goal is always to provide you with excellent care. Hearing back from our patients is one way we can continue to improve our services. Please take a few minutes to complete the written survey that you may receive in the mail after your visit with us. Thank you!             Your Updated Medication List - Protect others around you: Learn how to safely use, store and throw away your medicines at www.disposemymeds.org.          This list is accurate as of: 6/30/17  10:30 AM.  Always use your most recent med list.                   Brand Name Dispense Instructions for use Diagnosis    * albuterol 108 (90 BASE) MCG/ACT Inhaler    PROAIR HFA/PROVENTIL HFA/VENTOLIN HFA    1 Inhaler    Inhale 2 puffs into the lungs every 4 hours as needed for shortness of breath / dyspnea    Intermittent asthma, uncomplicated       * albuterol (2.5 MG/3ML) 0.083% neb solution      INHALE CONTENTS OF 1 VIAL VIA NEBULIZER EVERY 4 HOURS AS NEEDED FOR WHEEZING.        * albuterol 108 (90 BASE) MCG/ACT Inhaler    PROAIR HFA/PROVENTIL HFA/VENTOLIN HFA     Inhale 2 puffs into the lungs        amLODIPine 5 MG tablet    NORVASC    30 tablet    Take 1 tablet (5 mg) by mouth daily    Essential hypertension       FLUoxetine 40 MG capsule    PROzac    90 capsule    Take 1 capsule (40 mg) by mouth daily    KRISTINA (generalized anxiety disorder)       loratadine 10 MG tablet    CLARITIN     Take 10 mg by mouth        LORazepam 1 MG tablet    ATIVAN    30 tablet    Take 1 tablet (1 mg) by mouth daily as needed for anxiety    Anxiety       metoprolol 25 MG 24 hr tablet    TOPROL-XL    30 tablet    Take 1 tablet (25 mg) by mouth daily    Essential hypertension       * Notice:  This list has 3 medication(s) that are the same as other medications prescribed for you. Read the directions carefully, and ask your doctor or other care provider to review them with you.

## 2017-06-30 NOTE — NURSING NOTE
"Chief Complaint   Patient presents with     RECHECK     Panel Management     KRISTINA Michele, KRISTINE       Initial /90  Pulse 78  Temp 97.3  F (36.3  C) (Temporal)  Resp 16  Ht 6' 1.75\" (1.873 m)  Wt (!) 370 lb 14.4 oz (168.2 kg)  BMI 47.94 kg/m2 Estimated body mass index is 47.94 kg/(m^2) as calculated from the following:    Height as of this encounter: 6' 1.75\" (1.873 m).    Weight as of this encounter: 370 lb 14.4 oz (168.2 kg).  Medication Reconciliation: complete     Lynne Phan, JOSH  June 30, 2017      "

## 2017-06-30 NOTE — LETTER
My Asthma Action Plan  Name: Josué Franco   YOB: 1980  Date: 6/30/2017   My doctor: LAWSON Hernandez CNP   My clinic: [facility]        My Control Medicine: none  My Rescue Medicine: albuterol as directed   My Asthma Severity: intermittent  Avoid your asthma triggers:   None            GREEN ZONE   Good Control    I feel good    No cough or wheeze    Can work, sleep and play without asthma symptoms       Take your asthma control medicine every day.     1. If exercise triggers your asthma, take your rescue medication    15 minutes before exercise or sports, and    During exercise if you have asthma symptoms  2. Spacer to use with inhaler: If you have a spacer, make sure to use it with your inhaler             YELLOW ZONE Getting Worse  I have ANY of these:    I do not feel good    Cough or wheeze    Chest feels tight    Wake up at night   1. Keep taking your Green Zone medications  2. Start taking your rescue medicine:    every 20 minutes for up to 1 hour. Then every 4 hours for 24-48 hours.  3. If you stay in the Yellow Zone for more than 12-24 hours, contact your doctor.  4. If you do not return to the Green Zone in 12-24 hours or you get worse, start taking your oral steroid medicine if prescribed by your provider.           RED ZONE Medical Alert - Get Help  I have ANY of these:    I feel awful    Medicine is not helping    Breathing getting harder    Trouble walking or talking    Nose opens wide to breathe       1. Take your rescue medicine NOW  2. If your provider has prescribed an oral steroid medicine, start taking it NOW  3. Call your doctor NOW  4. If you are still in the Red Zone after 20 minutes and you have not reached your doctor:    Take your rescue medicine again and    Call 911 or go to the emergency room right away    See your regular doctor within 2 weeks of an Emergency Room or Urgent Care visit for follow-up treatment.        Electronically signed by: Belkis GONSALVES  Farrukh, June 30, 2017    Annual Reminders:  Meet with Asthma Educator,  Flu Shot in the Fall, consider Pneumonia Vaccination for patients with asthma (aged 19 and older).    Pharmacy:    [facility] [place] - [address]  [facility] [address] [facility] [employer] - [place] - [address]                    Asthma Triggers  How To Control Things That Make Your Asthma Worse    Triggers are things that make your asthma worse.  Look at the list below to help you find your triggers and what you can do about them.  You can help prevent asthma flare-ups by staying away from your triggers.      Trigger                                                          What you can do   Cigarette Smoke  Tobacco smoke can make asthma worse. Do not allow smoking in your home, car or around you.  Be sure no one smokes at a child s day care or school.  If you smoke, ask your health care provider for ways to help you quit.  Ask family members to quit too.  Ask your health care provider for a referral to Quit Plan to help you quit smoking, or call 4-603-708-PLAN.     Colds, Flu, Bronchitis  These are common triggers of asthma. Wash your hands often.  Don t touch your eyes, nose or mouth.  Get a flu shot every year.     Dust Mites  These are tiny bugs that live in cloth or carpet. They are too small to see. Wash sheets and blankets in hot water every week.   Encase pillows and mattress in dust mite proof covers.  Avoid having carpet if you can. If you have carpet, vacuum weekly.   Use a dust mask and HEPA vacuum.   Pollen and Outdoor Mold  Some people are allergic to trees, grass, or weed pollen, or molds. Try to keep your windows closed.  Limit time out doors when pollen count is high.   Ask you health care provider about taking medicine during allergy season.     Animal Dander  Some people are allergic to skin flakes, urine or saliva from pets with fur or feathers. Keep pets with fur or feathers out of your home.    If  you can t keep the pet outdoors, then keep the pet out of your bedroom.  Keep the bedroom door closed.  Keep pets off cloth furniture and away from stuffed toys.     Mice, Rats, and Cockroaches  Some people are allergic to the waste from these pests.   Cover food and garbage.  Clean up spills and food crumbs.  Store grease in the refrigerator.   Keep food out of the bedroom.   Indoor Mold  This can be a trigger if your home has high moisture. Fix leaking faucets, pipes, or other sources of water.   Clean moldy surfaces.  Dehumidify basement if it is damp and smelly.   Smoke, Strong Odors, and Sprays  These can reduce air quality. Stay away from strong odors and sprays, such as perfume, powder, hair spray, paints, smoke incense, paint, cleaning products, candles and new carpet.   Exercise or Sports  Some people with asthma have this trigger. Be active!  Ask your doctor about taking medicine before sports or exercise to prevent symptoms.    Warm up for 5-10 minutes before and after sports or exercise.     Other Triggers of Asthma  Cold air:  Cover your nose and mouth with a scarf.  Sometimes laughing or crying can be a trigger.  Some medicines and food can trigger asthma.

## 2017-07-01 ASSESSMENT — PATIENT HEALTH QUESTIONNAIRE - PHQ9: SUM OF ALL RESPONSES TO PHQ QUESTIONS 1-9: 2

## 2017-07-01 ASSESSMENT — ANXIETY QUESTIONNAIRES: GAD7 TOTAL SCORE: 0

## 2017-07-20 PROBLEM — I10 ESSENTIAL HYPERTENSION: Chronic | Status: ACTIVE | Noted: 2017-05-25

## 2017-07-31 ENCOUNTER — OFFICE VISIT (OUTPATIENT)
Dept: SLEEP MEDICINE | Facility: CLINIC | Age: 37
End: 2017-07-31
Attending: NURSE PRACTITIONER
Payer: COMMERCIAL

## 2017-07-31 VITALS
HEIGHT: 75 IN | HEART RATE: 74 BPM | DIASTOLIC BLOOD PRESSURE: 88 MMHG | BODY MASS INDEX: 39.17 KG/M2 | WEIGHT: 315 LBS | OXYGEN SATURATION: 95 % | SYSTOLIC BLOOD PRESSURE: 137 MMHG

## 2017-07-31 DIAGNOSIS — R53.83 FATIGUE, UNSPECIFIED TYPE: ICD-10-CM

## 2017-07-31 DIAGNOSIS — G47.33 OSA (OBSTRUCTIVE SLEEP APNEA): Primary | ICD-10-CM

## 2017-07-31 DIAGNOSIS — E66.01 MORBID OBESITY DUE TO EXCESS CALORIES (H): Chronic | ICD-10-CM

## 2017-07-31 PROCEDURE — 99243 OFF/OP CNSLTJ NEW/EST LOW 30: CPT | Performed by: INTERNAL MEDICINE

## 2017-07-31 NOTE — PATIENT INSTRUCTIONS

## 2017-07-31 NOTE — PROGRESS NOTES
Sleep Consultation:    Date on this visit: 7/31/2017    Josué Franco is sent by Belkis Chadwick for a sleep consultation regarding obstructive sleep apnea .    Primary Physician: Belkis Chadwick     Chief Complaint   Patient presents with     Sleep Problem     Consult - always tired, CPAP not helping to get restful sleep - also causing droopy eye lid syndrome     He was initially diagnosed with severe  obstructive sleep apnea at [facility] in 2006. He states he presented with excessive daytime sleepiness, apneas, gasping for air.  He has been on CPAP since then.     He is better but reports he still feels tired during the day. He states from rubbing his face at night, trying to take his mask off, and mask is leaking. He wears goggles with his cpap due to this. Patient sleeps on his side and stomach. New masks work better, but still has problems.     He uses [facility]. Hey have not tried different masks.     Overall, he rates the experience with PAP as 5 (0 poor, 10 great). The mask is comfortable.    The mask is leaking.  He is snoring at times with the mask on. He is not having gasp arousals.  He is not having significant oral/nasal dryness. The pressure is comfortable.     His PAP interface is Full Face Mask.    Bedtime is typically 2230. Usually it takes about 10 min minutes to fall asleep with the mask on. Wake time is typically 0800.  Patient is using PAP therapy 9 hours per night. The patient is usually getting 6.5 hours of sleep per night.    He does not feel rested in the morning.    Total score - [place]: 3 (7/31/2017  7:00 AM)    [employer]   CPAP 11.2 cmH2O download:  30 total days of use. 0 nonuse days. 30 days with >4 hours use.  Average use 9 hours 22 minutes per day. Median Leak 10.8 L/min. 95%ile Leak 43 L/min. AHI 5.7.       Josué goes to bed at 10:30 PM during the week. He gets up at 8:00 AM with an alarm. He falls asleep in 15 minutes.  Josué denies difficulty falling asleep.  He wakes up 4-5  times a night for 5 minutes before falling back to sleep.  Josué wakes up to go to the bathroom and mask leaking.  On weekends, Josué goes to bed at 11:00 PM.  He gets up at 7:30 AM with an alarm.     Patient does not use electronics in bed and watch TV in bed.     Patient does have a regular bed partner. He has occasional morning headaches, and has nocturnal leg kicking, but denies morning confusions or restless legs.     oJsué has occasional arm or leg movements 'during dreams' and rare sleep paralysis. He denies any sleep walking, sleep talking, cataplexy and hypnogogic/hypnopompic hallucinations.    Josué has gained 0-5 pounds in 10 years.  Patient describes themself as a night person. Patient's [place] Sleepiness score 3/24 inconsistent with severe daytime sleepiness.  He has more fatigue.     Josué naps 1 times per week for on CPAP minutes. He takes no inadvertant naps.  He denies dozing while driving < 2hours since starting CPAP.  He uses 1 sodas/day.     He takes ativan 1/month.     Lab Results   Component Value Date    TSH 2.27 04/21/2016     CBC RESULTS:   Recent Labs   Lab Test  03/19/12   0937  09/04/09   1554   WBC   --   11.5*   RBC   --   4.92   HGB  13.7  15.1   HCT   --   43.8   MCV   --   89   MCH   --   30.6   MCHC   --   34.4   RDW   --   12.4   PLT   --   294     Recent Labs   Lab Test  05/25/17   1006  04/21/16   1149 02/29/16 09/04/09   1554   NA  139   --   132   --   141   POTASSIUM  3.9   --   3.7   --   4.4   CHLORIDE  104   --   97   --   104   CO2  25   --    --    --   26   ANIONGAP  10   --   9.0   --   11   GLC  151*  112*  145*   < >  92   BUN  10   --   11   --   18   CR  0.72   --   0.89   < >  1.00   BRAD  9.0   --   7.8   --   9.5    < > = values in this interval not displayed.     Liver Function Studies -   Recent Labs   Lab Test  09/04/09   1554   PROTTOTAL  8.1   ALBUMIN  4.4   BILITOTAL  0.3   ALKPHOS  102   AST  26   ALT  20          Allergies:    Allergies   Allergen  Reactions     No Known Allergies        Medications:    Current Outpatient Prescriptions   Medication Sig Dispense Refill     FLUoxetine (PROZAC) 40 MG capsule Take 1 capsule (40 mg) by mouth daily 90 capsule 1     amLODIPine (NORVASC) 5 MG tablet Take 1 tablet (5 mg) by mouth daily 30 tablet 1     metoprolol (TOPROL-XL) 25 MG 24 hr tablet Take 1 tablet (25 mg) by mouth daily 90 tablet 1     albuterol (2.5 MG/3ML) 0.083% neb solution INHALE CONTENTS OF 1 VIAL VIA NEBULIZER EVERY 4 HOURS AS NEEDED FOR WHEEZING.  2     loratadine (CLARITIN) 10 MG tablet Take 10 mg by mouth       LORazepam (ATIVAN) 1 MG tablet Take 1 tablet (1 mg) by mouth daily as needed for anxiety 30 tablet 0     albuterol (PROAIR HFA, PROVENTIL HFA, VENTOLIN HFA) 108 (90 BASE) MCG/ACT inhaler Inhale 2 puffs into the lungs every 4 hours as needed for shortness of breath / dyspnea 1 Inhaler 5     [DISCONTINUED] albuterol (PROAIR HFA/PROVENTIL HFA/VENTOLIN HFA) 108 (90 BASE) MCG/ACT Inhaler Inhale 2 puffs into the lungs         Problem List:  Patient Active Problem List    Diagnosis Date Noted     ADRIANNA (obstructive sleep apnea) 06/30/2017     Priority: Medium     Essential hypertension 05/25/2017     Priority: Medium     BMI over 45 07/22/2016     Priority: Medium     KRISTINA (generalized anxiety disorder) 06/03/2013     Priority: Medium     INTERMITTENT ASTHMA  04/16/2010     Priority: Medium     Oligospermia 03/19/2012     Priority: Low     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     Priority: Low     CARDIOVASCULAR SCREENING; LDL GOAL LESS THAN 160 10/31/2010     Priority: Low        Past Medical/Surgical History:  Past Medical History:   Diagnosis Date     Nephrolithiasis      Past Surgical History:   Procedure Laterality Date     CHOLECYSTECTOMY  1997     LITHOTRIPSY  7/08    Lithotrypsy     NISSEN FUNDOPLICATION  2001    Nissen Fundiplication       Social History:  Social History     Social History     Marital status: Single     Spouse name: N/A      Number of children: 0     Years of education: N/A     Occupational History     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 Yes     FATHER, ANGIOPLASTY AGE 52     Social History Narrative       Family History:  Family History   Problem Relation Age of Onset     C.A.D. Father      DIABETES No family hx of      Hypertension No family hx of        Review of Systems:  A complete review of systems reviewed by me is negative with the exeption of what has been mentioned in the history of present illness.  CONSTITUTIONAL: NEGATIVE for weight gain/loss, fever, chills, sweats or night sweats, drug allergies.  EYES: NEGATIVE for changes in vision, blind spots, double vision.  ENT: NEGATIVE for ear pain, sore throat, sinus pain, post-nasal drip, runny nose, bloody nose  CARDIAC: NEGATIVE for fast heartbeats or fluttering in chest, chest pain or pressure, breathlessness when lying flat, swollen legs or swollen feet.  NEUROLOGIC:  POSITIVE for  headaches  DERMATOLOGIC: NEGATIVE for rashes, new moles or change in mole(s)  PULMONARY: NEGATIVE SOB at rest, SOB with activity, dry cough, productive cough, coughing up blood, wheezing or whistling when breathing.    GASTROINTESTINAL: NEGATIVE for nausea or vomitting, loose or watery stools, fat or grease in stools, constipation, abdominal pain, bowel movements black in color or blood noted.  GENITOURINARY: NEGATIVE for pain during urination, blood in urine, urinating more frequently than usual, irregular menstrual periods.  MUSCULOSKELETAL:  POSITIVE for  muscle pain, bone or joint pain and swollen joints  ENDOCRINE: NEGATIVE for increased thirst or urination,  "diabetes.  LYMPHATIC: NEGATIVE for swollen lymph nodes, lumps or bumps in the breasts or nipple discharge.    Physical Examination:  Vitals: /88  Pulse 74  Ht 1.905 m (6' 3\")  Wt (!) 169.2 kg (373 lb)  SpO2 95%  BMI 46.62 kg/m2  BMI= Body mass index is 46.62 kg/(m^2).         [place] Total Score 7/31/2017   Total score - [place] 3       GENERAL APPEARANCE: alert and no distress  EYES: Eyes grossly normal to inspection and conjunctivae and sclerae normal  HENT: ear canals and TM's normal, nose and mouth without ulcers or lesions and oropharynx crowded  NECK: no adenopathy, no asymmetry, masses, or scars and thyroid normal to palpation  RESP: lungs clear to auscultation - no rales, rhonchi or wheezes  CV: regular rates and rhythm, normal S1 S2, no S3 or S4 and no murmur, click or rub  ABDOMEN: soft, nontender, without hepatosplenomegaly or masses and bowel sounds normal  MS: extremities normal- no gross deformities noted  NEURO: Normal strength and tone, mentation intact, speech normal and cranial nerves 2-12 intact  PSYCH: mentation appears normal and affect normal/bright  Mallampati Class: IV.  Tonsillar Stage: 1  hidden by pillars.    Impression/Plan:    Severe obstructive sleep apnea by sleep study 10/2006. 60# weight gain since then with persistent fatigue/unrefreshing sleep (ESS 3). Having mask issues.  -See DME about mask fit  -Consider Polysomnogram (using 4% desaturation/Medicare/2012 AASM 1B scoring rules) with Transcutaneous CO2 Monitoring (TCM), all night titration.   -check cbc    Obstructive sleep apnea reviewed.  Complications of untreated sleep apnea were reviewed.    Manjit Sotelo     CC: Belkis Chadwick        "

## 2017-07-31 NOTE — NURSING NOTE
"Chief Complaint   Patient presents with     Sleep Problem     Consult - always tired, CPAP not helping to get restful sleep - also causing droopy eye lid syndrome       Initial /88  Pulse 74  Ht 1.905 m (6' 3\")  Wt (!) 169.2 kg (373 lb)  SpO2 95%  BMI 46.62 kg/m2 Estimated body mass index is 46.62 kg/(m^2) as calculated from the following:    Height as of this encounter: 1.905 m (6' 3\").    Weight as of this encounter: 169.2 kg (373 lb).  Medication Reconciliation: complete   Neck Cir (cm): 54 cm (7/31/2017  7:30 AM)  ESS 3    Lisa Carpio CMA      "

## 2017-07-31 NOTE — MR AVS SNAPSHOT
After Visit Summary   7/31/2017    Josué Franco    MRN: [id number]           Patient Information     Date Of Birth          1980        Visit Information        Provider Department      7/31/2017 7:00 AM Manjit Sotelo MD [facility]        Today's Diagnoses     ADRIANNA (obstructive sleep apnea)    -  1    Fatigue, unspecified type          Care Instructions      Your BMI is Body mass index is 46.62 kg/(m^2).  Weight management is a personal decision.  If you are interested in exploring weight loss strategies, the following discussion covers the approaches that may be successful. Body mass index (BMI) is one way to tell whether you are at a healthy weight, overweight, or obese. It measures your weight in relation to your height.  A BMI of 18.5 to 24.9 is in the healthy range. A person with a BMI of 25 to 29.9 is considered overweight, and someone with a BMI of 30 or greater is considered obese. More than two-thirds of American adults are considered overweight or obese.  Being overweight or obese increases the risk for further weight gain. Excess weight may lead to heart disease and diabetes.  Creating and following plans for healthy eating and physical activity may help you improve your health.  Weight control is part of healthy lifestyle and includes exercise, emotional health, and healthy eating habits. Careful eating habits lifelong are the mainstay of weight control. Though there are significant health benefits from weight loss, long-term weight loss with diet alone may be very difficult to achieve- studies show long-term success with dietary management in less than 10% of people. Attaining a healthy weight may be especially difficult to achieve in those with severe obesity. In some cases, medications, devices and surgical management might be considered.  What can you do?  If you are overweight or obese and are interested in methods for weight loss, you should discuss this with your  provider.     Consider reducing daily calorie intake by 500 calories.     Keep a food journal.     Avoiding skipping meals, consider cutting portions instead.    Diet combined with exercise helps maintain muscle while optimizing fat loss. Strength training is particularly important for building and maintaining muscle mass. Exercise helps reduce stress, increase energy, and improves fitness. Increasing exercise without diet control, however, may not burn enough calories to loose weight.       Start walking three days a week 10-20 minutes at a time    Work towards walking thirty minutes five days a week     Eventually, increase the speed of your walking for 1-2 minutes at time    In addition, we recommend that you review healthy lifestyles and methods for weight loss available through the National Institutes of Health patient information sites:  http://win.niddk.nih.gov/publications/index.htm    And look into health and wellness programs that may be available through your health insurance provider, employer, local community center, or nabila club.    Weight management plan: Patient was referred to their PCP to discuss a diet and exercise plan.              Follow-ups after your visit        Follow-up notes from your care team     Return in about 2 years (around 7/31/2019) for Routine Visit.      Future tests that were ordered for you today     Open Future Orders        Priority Expected Expires Ordered    CBC with platelets differential Routine  8/31/2017 7/31/2017            Who to contact     If you have questions or need follow up information about today's clinic visit or your schedule please contact [facility] directly at [phone].  Normal or non-critical lab and imaging results will be communicated to you by MyChart, letter or phone within 4 business days after the clinic has received the results. If you do not hear from us within 7 days, please contact the clinic through MyChart or phone. If  "you have a critical or abnormal lab result, we will notify you by phone as soon as possible.  Submit refill requests through [employer] or call your pharmacy and they will forward the refill request to us. Please allow 3 business days for your refill to be completed.          Additional Information About Your Visit        [employer] Information     [employer] lets you send messages to your doctor, view your test results, renew your prescriptions, schedule appointments and more. To sign up, go to [email or web address] . Click on \"Log in\" on the left side of the screen, which will take you to the Welcome page. Then click on \"Sign up Now\" on the right side of the page.     You will be asked to enter the access code listed below, as well as some personal information. Please follow the directions to create your username and password.     Your access code is: [email or web address]  Expires: 2017 10:44 AM     Your access code will  in 90 days. If you need help or a new code, please call your [place] clinic or [phone].        Care EveryWhere ID     This is your Care EveryWhere ID. This could be used by other organizations to access your [place] medical records  [id number]        Your Vitals Were     Pulse Height Pulse Oximetry BMI (Body Mass Index)          74 1.905 m (6' 3\") 95% 46.62 kg/m2         Blood Pressure from Last 3 Encounters:   17 137/88   17 132/90   17 (!) 130/104    Weight from Last 3 Encounters:   17 (!) 169.2 kg (373 lb)   17 (!) 168.2 kg (370 lb 14.4 oz)   17 (!) 167.8 kg (370 lb)              We Performed the Following     Comprehensive DME     SLEEP EVALUATION & MANAGEMENT REFERRAL - ADULT          Today's Medication Changes          These changes are accurate as of: 17  8:17 AM.  If you have any questions, ask your nurse or doctor.               Start taking these medicines.        Dose/Directions    order for DME   Used for:  ADRIANNA (obstructive sleep apnea) "   Started by:  Manjit Sotelo MD        CPAP 11 cm   Quantity:  1 Units   Refills:  1            Where to get your medicines      Information about where to get these medications is not yet available     ! Ask your nurse or doctor about these medications     order for DME                Primary Care Provider Office Phone # Fax #    LAWSON Hicks EYAD [phone]       [facility] [address] [facility] [address]        Equal Access to Services     RUDDY LUZ : Hadii aad ku hadasho Soomaali, waaxda luqadaha, qaybta kaalmada adeegyada, waxay idiin hayaan adeeg kharash la'alvaron . So [facility] [phone].    ATENCIÓN: Si habla español, tiene a gaxiola disposición servicios gratuitos de asistencia lingüística. Bellaame al [phone].    We comply with applicable federal civil rights laws and [place] laws. We do not discriminate on the basis of race, color, national origin, age, disability sex, sexual orientation or gender identity.            Thank you!     Thank you for choosing [facility]  for your care. Our goal is always to provide you with excellent care. Hearing back from our patients is one way we can continue to improve our services. Please take a few minutes to complete the written survey that you may receive in the mail after your visit with us. Thank you!             Your Updated Medication List - Protect others around you: Learn how to safely use, store and throw away your medicines at www.disposemymeds.org.          This list is accurate as of: 7/31/17  8:17 AM.  Always use your most recent med list.                   Brand Name Dispense Instructions for use Diagnosis    * albuterol 108 (90 BASE) MCG/ACT Inhaler    PROAIR HFA/PROVENTIL HFA/VENTOLIN HFA    1 Inhaler    Inhale 2 puffs into the lungs every 4 hours as needed for shortness of breath / dyspnea    Intermittent asthma, uncomplicated       * albuterol (2.5 MG/3ML) 0.083% neb solution      INHALE CONTENTS OF  1 VIAL VIA NEBULIZER EVERY 4 HOURS AS NEEDED FOR WHEEZING.        amLODIPine 5 MG tablet    NORVASC    30 tablet    Take 1 tablet (5 mg) by mouth daily    Essential hypertension       FLUoxetine 40 MG capsule    PROzac    90 capsule    Take 1 capsule (40 mg) by mouth daily    KRISTINA (generalized anxiety disorder)       loratadine 10 MG tablet    CLARITIN     Take 10 mg by mouth        LORazepam 1 MG tablet    ATIVAN    30 tablet    Take 1 tablet (1 mg) by mouth daily as needed for anxiety    Anxiety       metoprolol 25 MG 24 hr tablet    TOPROL-XL    90 tablet    Take 1 tablet (25 mg) by mouth daily    Essential hypertension       order for DME     1 Units    CPAP 11 cm    ADRIANNA (obstructive sleep apnea)       * Notice:  This list has 2 medication(s) that are the same as other medications prescribed for you. Read the directions carefully, and ask your doctor or other care provider to review them with you.

## 2017-08-02 DIAGNOSIS — G47.33 OSA (OBSTRUCTIVE SLEEP APNEA): Primary | ICD-10-CM

## 2017-09-08 ENCOUNTER — TELEPHONE (OUTPATIENT)
Dept: FAMILY MEDICINE | Facility: CLINIC | Age: 37
End: 2017-09-08

## 2017-09-08 NOTE — TELEPHONE ENCOUNTER
Summary:    Patient is due/failing the following:   BP check     Action needed:   Patient needs nurse only appointment.    Type of outreach:    Phone, left message for patient to call back.     Questions for provider review:    None                                                                                                                                    Oly Rowan       Chart routed to Care Team .    Panel Management Review      Patient has the following on his problem list:     Asthma review     ACT Total Scores 5/25/2017   ACT TOTAL SCORE -   ASTHMA ER VISITS -   ASTHMA HOSPITALIZATIONS -   ACT TOTAL SCORE (Goal Greater than or Equal to 20) 23   In the past 12 months, how many times did you visit the emergency room for your asthma without being admitted to the hospital? 0   In the past 12 months, how many times were you hospitalized overnight because of your asthma? 0      1. Is Asthma diagnosis on the Problem List? Yes    2. Is Asthma listed on Health Maintenance? Yes    3. Patient is due for: none  Hypertension   Last three blood pressure readings:  BP Readings from Last 3 Encounters:   07/31/17 137/88   06/30/17 132/90   05/25/17 (!) 130/104     Blood pressure: Passed    HTN Guidelines:  Age 18-59 BP range:  Less than 140/90  Age 60-85 with Diabetes:  Less than 140/90  Age 60-85 without Diabetes:  less than 150/90          Composite cancer screening  Chart review shows that this patient is due/due soon for the following None

## 2017-11-14 DIAGNOSIS — F41.9 ANXIETY: ICD-10-CM

## 2017-11-15 RX ORDER — LORAZEPAM 1 MG/1
TABLET ORAL
Qty: 30 TABLET | Refills: 0 | OUTPATIENT
Start: 2017-11-15

## 2017-11-16 RX ORDER — LORAZEPAM 1 MG/1
1 TABLET ORAL DAILY PRN
Qty: 30 TABLET | Refills: 0 | OUTPATIENT
Start: 2017-11-16

## 2017-11-16 NOTE — TELEPHONE ENCOUNTER
Pt called back to ask why the ativan medication refill was denied.  He had an appt in June 2017 and was told to f/u in 6 months, which is December.    Provider, can pt get another refill before then?

## 2017-11-16 NOTE — PROGRESS NOTES
SUBJECTIVE:                                                    Josué Franco is a 37 year old male who presents to clinic today for the following health issues:    History of Present Illness     Depression & Anxiety Follow-up:     Depression/Anxiety:  Anxiety only    Status since last visit::  Stable    Other associated symptoms of anxiety::  None    Significant life event::  No    Current substance use::  None    Depression symptoms::  None    Diet:  Regular (no restrictions)  Frequency of exercise:  1 day/week  Duration of exercise:  15-30 minutes  Taking medications regularly:  Yes  Medication side effects:  Not applicable  Additional concerns today:  YES    Joint Pain - Left shoulder pain    Onset: off and on for 1 year, getting worse within the last month    Description:   Location: left shoulder  Character: Sharp and Constant    Intensity: moderate, severe    Progression of Symptoms: worse    Accompanying Signs & Symptoms:  Other symptoms: radiation of pain to back and rib cage    History:   Previous similar pain: YES      Precipitating factors:   Trauma or overuse: no     Alleviating factors:  Improved by: heat, ice and deep heating rub    Therapies Tried and outcome: heat, ice and deep heating rub, helps temporarily    The patient feels as thought his pain might be contributing to his increased BP, especially his left shoulder. He sleeps on his right side right now because he cant. He feels as though the pain is more in his shoulder, but it radiates posteriorly to his back and neck. He has erector spinae area muscular pain in the back but also feels pain inhis shoulder with certain motions.     He has no issues with ROM but has tingling radiating down his arms if he holds them above his head for too long. He reports tingling in his left small finger with radiation.      Mood: the patient says that his mood has been okay. He states he has had stressors and that he uses the Ativan once every couple of weeks  "for anxiety issues. He says that in the last couple of weeks he feels like he is on the \"edge of something happening\".     Social Hx: the patient is going to [place] for his mother-in-law's 5 year anniversary of cancer remission.    Meds: The patient notes that he is getting jittery off of his metoprolol and having acid reflux issues. He is not taking the Norvasc because he hasn't had the refill.     Problem list and histories reviewed & adjusted, as indicated.  Additional history: as documented    Patient Active Problem List   Diagnosis     INTERMITTENT ASTHMA      CARDIOVASCULAR SCREENING; LDL GOAL LESS THAN 160     Oligospermia     Testicular hypofunction     KRISTINA (generalized anxiety disorder)     BMI over 45     Essential hypertension     ADRIANNA (obstructive sleep apnea)- severe (AHI 82)     Past Surgical History:   Procedure Laterality Date     CHOLECYSTECTOMY  1997     LITHOTRIPSY  7/08    Lithotrypsy     NISSEN FUNDOPLICATION  2001    Nissen Fundiplication       Social History   Substance Use Topics     Smoking status: Never Smoker     Smokeless tobacco: Never Used     Alcohol use Yes      Comment: Very moderate 0-1/week     Family History   Problem Relation Age of Onset     Coronary Artery Disease Father      DIABETES No family hx of      Hypertension No family hx of      Colon Cancer No family hx of          Current Outpatient Prescriptions   Medication Sig Dispense Refill     amLODIPine (NORVASC) 5 MG tablet Take 1 tablet (5 mg) by mouth daily 30 tablet 1     atenolol (TENORMIN) 25 MG tablet Take 1 tablet (25 mg) by mouth daily 30 tablet 1     FLUoxetine (PROZAC) 20 MG capsule Take 3 capsules (60 mg) by mouth daily 90 capsule 1     LORazepam (ATIVAN) 1 MG tablet Take 1 tablet (1 mg) by mouth daily as needed for anxiety 20 tablet 0     order for DME CPAP 11 cm 1 Units 1     albuterol (2.5 MG/3ML) 0.083% neb solution INHALE CONTENTS OF 1 VIAL VIA NEBULIZER EVERY 4 HOURS AS NEEDED FOR WHEEZING.  2     albuterol " (PROAIR HFA, PROVENTIL HFA, VENTOLIN HFA) 108 (90 BASE) MCG/ACT inhaler Inhale 2 puffs into the lungs every 4 hours as needed for shortness of breath / dyspnea 1 Inhaler 5     [DISCONTINUED] FLUoxetine (PROZAC) 40 MG capsule Take 1 capsule (40 mg) by mouth daily 90 capsule 1     [DISCONTINUED] amLODIPine (NORVASC) 5 MG tablet Take 1 tablet (5 mg) by mouth daily (Patient not taking: Reported on 11/17/2017) 30 tablet 1     loratadine (CLARITIN) 10 MG tablet Take 10 mg by mouth       Allergies   Allergen Reactions     No Known Allergies      ROS:  Constitutional, neuro, ENT, endocrine, pulmonary, cardiac, gastrointestinal, genitourinary, musculoskeletal, integument and psychiatric systems are negative, except as otherwise noted.    This document serves as a record of the services and decisions personally performed and made by Belkis Chadwick DNP. It was created on her behalf by Joe Faustin, a trained medical scribe. The creation of this document is based on the provider's statements to the medical scribe.  Joe Faustin 4:13 PM November 17, 2017    OBJECTIVE:                                                    /90  Pulse 92  Temp 98.6  F (37  C) (Temporal)  Resp 18  Wt (!) 169.1 kg (372 lb 14.4 oz)  SpO2 96%  BMI 46.61 kg/m2  Body mass index is 46.61 kg/(m^2).     GENERAL APPEARANCE: healthy, alert and no distress, morbidly obese   EYES: Eyes grossly normal to inspection, PERRL and conjunctivae and sclerae normal  HENT: ear canals and TM's normal and nose and mouth without ulcers or lesions  NECK: no adenopathy, no asymmetry, masses, or scars and thyroid normal to palpation  MS: See ortho exam below, otherwise extremities normal:  No gross deformities noted, normal strength, sensation, and circulation noted  RESP: lungs clear to auscultation - no rales, rhonchi or wheezes  CV: regular rates and rhythm, normal S1 S2, no S3 or S4 and no murmur, click or rub  NEURO: Normal strength and tone, sensory exam  grossly normal, mentation intact, speech normal  PSYCH: mentation appears normal and affect normal/bright    L Shoulder: mild grinding noted with abduction, full ROM, unable tor reproduce pain. Tender along paraspinal and mid-back on the left side     Diagnostic test results:  No results found for this or any previous visit (from the past 24 hour(s)).       ASSESSMENT/PLAN:                                                        ICD-10-CM    1. KRISTINA (generalized anxiety disorder) F41.1 FLUoxetine (PROZAC) 20 MG capsule     DISCONTINUED: LORazepam (ATIVAN) 1 MG tablet   2. Essential hypertension I10 amLODIPine (NORVASC) 5 MG tablet     atenolol (TENORMIN) 25 MG tablet   3. BMI over 45 E66.01    4. Anxiety F41.9 LORazepam (ATIVAN) 1 MG tablet   5. Chronic left shoulder pain M25.512 MR Shoulder Left w/o Contrast    G89.29      Anxiety: Discussed adjusting medications to better control anxiety. Patient is increased from 40 mg to 60 mg of Prozac and will take 1 mg Ativan. Order placed for Ativan 1 mg tablet - to last upwards of 5 months, and advised visit of every 3 moths for controlled substance refills and Prozac 60 mg tablets ordered. Medication direction, dosage, and side effects discussed with patient.  Re-check in 3-4 weeks.    HTN: advised trying Atenolol that the patient can tolerate better. Want pt. To tryOrder placed for atenolol 25 mg tablet. Medication direction, dosage, and side effects discussed with patient. Norvasc 5 mg tablet refilled.     L Shoulder: advised MRI to examine ligament problems that may be contributing to the pt's shoulder arthralgia. Offered orthopedics referral. MRI of left shoulder w/o contrast ordered and patient will follow up for orthopedics referral if needed. May need to adjust due to body habitus.     Lifestyle: advised exercising outside of work and implementing healthy dietary habits to reduce BMI. Nutritionist referral offered, but patient is already seeing a nutritionist through  work.     Follow up with Provider - 2 weeks for BP check, 3-4 weeks  For meds/labs/medication check     The information in this document, created by the medical scribe for me, accurately reflects the services I personally performed and the decisions made by me. I have reviewed and approved this document for accuracy prior to leaving the patient care area.  November 17, 2017 4:30 PM    LAWSON [facility]

## 2017-11-17 ENCOUNTER — OFFICE VISIT (OUTPATIENT)
Dept: FAMILY MEDICINE | Facility: CLINIC | Age: 37
End: 2017-11-17
Payer: COMMERCIAL

## 2017-11-17 ENCOUNTER — TELEPHONE (OUTPATIENT)
Dept: FAMILY MEDICINE | Facility: CLINIC | Age: 37
End: 2017-11-17

## 2017-11-17 VITALS
HEART RATE: 92 BPM | OXYGEN SATURATION: 96 % | BODY MASS INDEX: 46.61 KG/M2 | SYSTOLIC BLOOD PRESSURE: 140 MMHG | DIASTOLIC BLOOD PRESSURE: 90 MMHG | WEIGHT: 315 LBS | RESPIRATION RATE: 18 BRPM | TEMPERATURE: 98.6 F

## 2017-11-17 DIAGNOSIS — I10 ESSENTIAL HYPERTENSION: ICD-10-CM

## 2017-11-17 DIAGNOSIS — F41.1 GAD (GENERALIZED ANXIETY DISORDER): Primary | Chronic | ICD-10-CM

## 2017-11-17 DIAGNOSIS — F41.9 ANXIETY: ICD-10-CM

## 2017-11-17 DIAGNOSIS — M25.512 CHRONIC LEFT SHOULDER PAIN: ICD-10-CM

## 2017-11-17 DIAGNOSIS — E66.01 MORBID OBESITY DUE TO EXCESS CALORIES (H): Chronic | ICD-10-CM

## 2017-11-17 DIAGNOSIS — G89.29 CHRONIC LEFT SHOULDER PAIN: ICD-10-CM

## 2017-11-17 PROCEDURE — 99214 OFFICE O/P EST MOD 30 MIN: CPT | Performed by: NURSE PRACTITIONER

## 2017-11-17 RX ORDER — LORAZEPAM 1 MG/1
1 TABLET ORAL EVERY 8 HOURS PRN
Qty: 20 TABLET | Refills: 0 | Status: SHIPPED | OUTPATIENT
Start: 2017-11-17 | End: 2017-11-17

## 2017-11-17 RX ORDER — ATENOLOL 25 MG/1
25 TABLET ORAL DAILY
Qty: 30 TABLET | Refills: 1 | Status: SHIPPED | OUTPATIENT
Start: 2017-11-17 | End: 2018-01-04

## 2017-11-17 RX ORDER — LORAZEPAM 1 MG/1
1 TABLET ORAL DAILY PRN
Qty: 20 TABLET | Refills: 0 | Status: SHIPPED | OUTPATIENT
Start: 2017-11-17 | End: 2019-02-21

## 2017-11-17 RX ORDER — AMLODIPINE BESYLATE 5 MG/1
5 TABLET ORAL DAILY
Qty: 30 TABLET | Refills: 1 | Status: SHIPPED | OUTPATIENT
Start: 2017-11-17 | End: 2018-01-04

## 2017-11-17 ASSESSMENT — ANXIETY QUESTIONNAIRES
5. BEING SO RESTLESS THAT IT IS HARD TO SIT STILL: NOT AT ALL
3. WORRYING TOO MUCH ABOUT DIFFERENT THINGS: NOT AT ALL
2. NOT BEING ABLE TO STOP OR CONTROL WORRYING: SEVERAL DAYS
GAD7 TOTAL SCORE: 4
1. FEELING NERVOUS, ANXIOUS, OR ON EDGE: SEVERAL DAYS
7. FEELING AFRAID AS IF SOMETHING AWFUL MIGHT HAPPEN: SEVERAL DAYS
6. BECOMING EASILY ANNOYED OR IRRITABLE: NOT AT ALL
4. TROUBLE RELAXING: SEVERAL DAYS
7. FEELING AFRAID AS IF SOMETHING AWFUL MIGHT HAPPEN: SEVERAL DAYS

## 2017-11-17 ASSESSMENT — PATIENT HEALTH QUESTIONNAIRE - PHQ9
10. IF YOU CHECKED OFF ANY PROBLEMS, HOW DIFFICULT HAVE THESE PROBLEMS MADE IT FOR YOU TO DO YOUR WORK, TAKE CARE OF THINGS AT HOME, OR GET ALONG WITH OTHER PEOPLE: NOT DIFFICULT AT ALL
SUM OF ALL RESPONSES TO PHQ QUESTIONS 1-9: 2
SUM OF ALL RESPONSES TO PHQ QUESTIONS 1-9: 2

## 2017-11-17 ASSESSMENT — PAIN SCALES - GENERAL: PAINLEVEL: SEVERE PAIN (6)

## 2017-11-17 NOTE — MR AVS SNAPSHOT
"              After Visit Summary   11/17/2017    Josué Franco    MRN: [id number]           Patient Information     Date Of Birth          1980        Visit Information        Provider Department      11/17/2017 4:00 PM Belkis Chadwick APRN CNP [facility] [place]        Today's Diagnoses     KRISTINA (generalized anxiety disorder)    -  1    Essential hypertension        BMI over 45        Anxiety        Chronic left shoulder pain           Follow-ups after your visit        Follow-up notes from your care team     Return in about 2 weeks (around 12/1/2017) for Lab Work, Medication Check, BP Recheck.      Future tests that were ordered for you today     Open Future Orders        Priority Expected Expires Ordered    MR Shoulder Left w/o Contrast Routine  11/17/2018 11/17/2017            Who to contact     If you have questions or need follow up information about today's clinic visit or your schedule please contact [facility] directly at [phone].  Normal or non-critical lab and imaging results will be communicated to you by MyChart, letter or phone within 4 business days after the clinic has received the results. If you do not hear from us within 7 days, please contact the clinic through MyChart or phone. If you have a critical or abnormal lab result, we will notify you by phone as soon as possible.  Submit refill requests through [employer] or call your pharmacy and they will forward the refill request to us. Please allow 3 business days for your refill to be completed.          Additional Information About Your Visit        MyChart Information     [employer] lets you send messages to your doctor, view your test results, renew your prescriptions, schedule appointments and more. To sign up, go to [email or web address] . Click on \"Log in\" on the left side of the screen, which will take you to the Welcome page. Then click on \"Sign up Now\" on the right side of the page.     You will be asked to enter " the access code listed below, as well as some personal information. Please follow the directions to create your username and password.     Your access code is: [email or web address]  Expires: 2/15/2018  5:00 PM     Your access code will  in 90 days. If you need help or a new code, please call your [facility] clinic or [phone].        Care EveryWhere ID     This is your Care EveryWhere ID. This could be used by other organizations to access your [facility] medical records  [id number]        Your Vitals Were     Pulse Temperature Respirations Pulse Oximetry BMI (Body Mass Index)       92 98.6  F (37  C) (Temporal) 18 96% 46.61 kg/m2        Blood Pressure from Last 3 Encounters:   17 140/90   17 137/88   17 132/90    Weight from Last 3 Encounters:   17 (!) 372 lb 14.4 oz (169.1 kg)   17 (!) 373 lb (169.2 kg)   17 (!) 370 lb 14.4 oz (168.2 kg)                 Today's Medication Changes          These changes are accurate as of: 17  5:00 PM.  If you have any questions, ask your nurse or doctor.               Start taking these medicines.        Dose/Directions    atenolol 25 MG tablet   Commonly known as:  TENORMIN   Used for:  Essential hypertension   Started by:  Belkis Chadwick APRN CNP        Dose:  25 mg   Take 1 tablet (25 mg) by mouth daily   Quantity:  30 tablet   Refills:  1         These medicines have changed or have updated prescriptions.        Dose/Directions    FLUoxetine 20 MG capsule   Commonly known as:  PROzac   This may have changed:    - medication strength  - how much to take   Used for:  KRISTINA (generalized anxiety disorder)   Changed by:  Belkis Chadwick APRN CNP        Dose:  60 mg   Take 3 capsules (60 mg) by mouth daily   Quantity:  90 capsule   Refills:  1         Stop taking these medicines if you haven't already. Please contact your care team if you have questions.     metoprolol 25 MG 24 hr tablet   Commonly known as:  TOPROL-XL   Stopped by:  Belkis Chadwick  LAWSON GRIFFIN CNP                Where to get your medicines      These medications were sent to [place] [address] [place] [facility] [place] - [address]  [address], [place] [address]     Phone:  [phone]     amLODIPine 5 MG tablet    atenolol 25 MG tablet    FLUoxetine 20 MG capsule         Some of these will need a paper prescription and others can be bought over the counter.  Ask your nurse if you have questions.     Bring a paper prescription for each of these medications     LORazepam 1 MG tablet                Primary Care Provider Office Phone # Fax #    LAWSON Hicks [phone] [address] [facility] [address]        Equal Access to Services     CHELY LUZ : Hadii ruthie saavedra hadasho Soomaali, waaxda luqadaha, qaybta kaalmada adeegyada, naheed ross . So [facility] [phone].    ATENCIÓN: Si habla español, tiene a gaxiola disposición servicios gratuitos de asistencia lingüística. Llame al [phone].    We comply with applicable federal civil rights laws and [place] laws. We do not discriminate on the basis of race, color, national origin, age, disability, sex, sexual orientation, or gender identity.            Thank you!     Thank you for choosing [facility]  for your care. Our goal is always to provide you with excellent care. Hearing back from our patients is one way we can continue to improve our services. Please take a few minutes to complete the written survey that you may receive in the mail after your visit with us. Thank you!             Your Updated Medication List - Protect others around you: Learn how to safely use, store and throw away your medicines at www.disposemymeds.org.          This list is accurate as of: 11/17/17  5:00 PM.  Always use your most recent med list.                   Brand Name Dispense Instructions for use Diagnosis    * albuterol 108 (90 BASE) MCG/ACT Inhaler    PROAIR HFA/PROVENTIL HFA/VENTOLIN HFA    1  Inhaler    Inhale 2 puffs into the lungs every 4 hours as needed for shortness of breath / dyspnea    Intermittent asthma, uncomplicated       * albuterol (2.5 MG/3ML) 0.083% neb solution      INHALE CONTENTS OF 1 VIAL VIA NEBULIZER EVERY 4 HOURS AS NEEDED FOR WHEEZING.        amLODIPine 5 MG tablet    NORVASC    30 tablet    Take 1 tablet (5 mg) by mouth daily    Essential hypertension       atenolol 25 MG tablet    TENORMIN    30 tablet    Take 1 tablet (25 mg) by mouth daily    Essential hypertension       FLUoxetine 20 MG capsule    PROzac    90 capsule    Take 3 capsules (60 mg) by mouth daily    KRISTINA (generalized anxiety disorder)       loratadine 10 MG tablet    CLARITIN     Take 10 mg by mouth        LORazepam 1 MG tablet    ATIVAN    20 tablet    Take 1 tablet (1 mg) by mouth daily as needed for anxiety    Anxiety       order for DME     1 Units    CPAP 11 cm    ADRIANNA (obstructive sleep apnea)       * Notice:  This list has 2 medication(s) that are the same as other medications prescribed for you. Read the directions carefully, and ask your doctor or other care provider to review them with you.

## 2017-11-18 ASSESSMENT — PATIENT HEALTH QUESTIONNAIRE - PHQ9: SUM OF ALL RESPONSES TO PHQ QUESTIONS 1-9: 2

## 2017-11-18 ASSESSMENT — ANXIETY QUESTIONNAIRES: GAD7 TOTAL SCORE: 4

## 2017-11-20 NOTE — TELEPHONE ENCOUNTER
BMI over 40 cannot be done in MG.  PH cannot accommodate weight over 350 lbs  [facility] can accommodate weight up to 400 lbs

## 2017-11-21 ENCOUNTER — TELEPHONE (OUTPATIENT)
Dept: FAMILY MEDICINE | Facility: CLINIC | Age: 37
End: 2017-11-21

## 2017-11-21 NOTE — TELEPHONE ENCOUNTER
Panel Management Review      Patient has the following on his problem list:     Hypertension   Last three blood pressure readings:  BP Readings from Last 3 Encounters:   11/17/17 140/90   07/31/17 137/88   06/30/17 132/90     Blood pressure: FAILED    HTN Guidelines:  Age 18-59 BP range:  Less than 140/90  Age 60-85 with Diabetes:  Less than 140/90  Age 60-85 without Diabetes:  less than 150/90        Composite cancer screening  Chart review shows that this patient is due/due soon for the following None  Summary:    Patient is due/failing the following:   BMP, ACT and BP CHECK    Action needed:   Patient needs non-fasting lab only appointment and Patient needs nurse only appointment.    Type of outreach:    post-pone patient was recently seen 11/17/17. ACT mailed to patient     Questions for provider review:    None                                                                                                                                    David Arreola MA       Chart routed to Care Team .

## 2017-11-21 NOTE — LETTER
[facility]  [address], [address]  [place] [address]  Phone: [phone]  Fax: [phone]  November 21, 2017      Josué Franco  [address] [address] [place] [address]  [place] [address]      Dear Josué,    We care about your health and have reviewed your health plan including your medical conditions, medications, and lab results.  Based on this review, it is recommended that you follow up regarding the following health topic(s):  -Asthma  -High Blood Pressure    We recommend you take the following action(s):  -schedule a FREE FLOAT MA-ONLY BLOOD PRESSURE APPOINTMENT within the next 1-4 weeks.  -schedule a LAB ONLY APPOINTMENT to recheck your: BMP (basic metabolic panel) within the next 1-4 weeks.  If' you have had labs completed eslewhere, please let us know so we can update your records.     -Complete and return the attached ASTHMA CONTROL TEST.  If your total score is 19 or less or you have been to the ER or urgent care for your asthma, then please schedule an asthma followup appointment. Once completed please mailed the form back to clinic( enveloped provided).      Please call us at the [facility] - [phone] (or use [employer]) to address the above recommendations.     Thank you for trusting [facility] and we appreciate the opportunity to serve you.  We look forward to supporting your healthcare needs in the future.    Healthy Regards,    Your Health Care Team  [facility]/

## 2017-12-24 DIAGNOSIS — F41.1 GAD (GENERALIZED ANXIETY DISORDER): ICD-10-CM

## 2017-12-26 RX ORDER — FLUOXETINE 40 MG/1
CAPSULE ORAL
Qty: 90 CAPSULE | Refills: 0 | Status: SHIPPED | OUTPATIENT
Start: 2017-12-26 | End: 2019-05-03

## 2017-12-26 NOTE — TELEPHONE ENCOUNTER
Requested Prescriptions   Pending Prescriptions Disp Refills     FLUoxetine (PROZAC) 40 MG capsule [Pharmacy Med Name: FLUOXETINE HCL 40 MG CAPSULE] 90 capsule 1     Sig: TAKE 1 CAPSULE BY MOUTH ONCE DAILY    SSRIs Protocol Passed    12/24/2017 12:57 AM       Passed - Recent or future visit with authorizing provider    Patient had office visit in the last year or has a visit in the next 30 days with authorizing provider.  See chart review.     11/17/2017         Passed - Patient is age 18 or older        Medication is being filled for 1 time refill only due to:  Patient needs to be seen because per last ov was to follow up..   Please call and help schedule.  Kev Luna, RN, BSN

## 2017-12-27 NOTE — PROGRESS NOTES
"  SUBJECTIVE:                                                    Josué Franco is a 37 year old male who presents to clinic today for the following health issues:      History of Present Illness     Asthma:     Cough::  YES    Wheezing::  YES    Dyspnea::  YES    Use of rescue inhaler::  At least daily    Taking Asthma medication as prescribed::  Yes    Asthma triggers::  Cold air and Upper respiratory infections    Depression & Anxiety Follow-up:     Depression/Anxiety:  Anxiety only    Status since last visit::  Improved    Other associated symptoms of anxiety::  None    Significant life event::  No    Current substance use::  None    Depression symptoms::  None    Hypertension:     Outpatient blood pressures:  Are not being checked    Dietary sodium intake::  Not monitoring salt intake    Diet:  Regular (no restrictions)  Frequency of exercise:  1 day/week  Duration of exercise:  15-30 minutes  Taking medications regularly:  Yes  Medication side effects:  None  Additional concerns today:  No    He has been taking Prozac. He did not take his medication this morning, and has not had anything for 12 hours. Patient is sleeping okay. His biggest issue is trying to rub his mask off in the middle of the night.    Asthma  Patient has been sick recently with the \"crud\". He has had a cough, wheezing, and dyspnea. He uses his inhaler daily. His triggers include cold air, and upper respiratory infections.     HENT  He expresses that hsi ears have been bothering him lately.     Problem list and histories reviewed & adjusted, as indicated.  Additional history: as documented    Patient Active Problem List   Diagnosis     INTERMITTENT ASTHMA      CARDIOVASCULAR SCREENING; LDL GOAL LESS THAN 160     Oligospermia     Testicular hypofunction     KRISTINA (generalized anxiety disorder)     BMI over 45     Essential hypertension     ADRIANNA (obstructive sleep apnea)- severe (AHI 82)     Past Surgical History:   Procedure Laterality Date     " CHOLECYSTECTOMY  1997     LITHOTRIPSY  7/08    Lithotrypsy     NISSEN FUNDOPLICATION  2001    Nissen Fundiplication       Social History   Substance Use Topics     Smoking status: Never Smoker     Smokeless tobacco: Never Used     Alcohol use No      Comment: Very moderate 0-1/week     Family History   Problem Relation Age of Onset     Coronary Artery Disease Father      DIABETES No family hx of      Hypertension No family hx of      Colon Cancer No family hx of          Current Outpatient Prescriptions   Medication Sig Dispense Refill     FLUoxetine (PROZAC) 20 MG capsule Take 3 capsules (60 mg) by mouth daily 270 capsule 3     predniSONE (DELTASONE) 20 MG tablet Take 1 tablet (20 mg) by mouth 2 times daily 10 tablet 0     amLODIPine (NORVASC) 5 MG tablet Take 1 tablet (5 mg) by mouth daily 90 tablet 3     atenolol (TENORMIN) 25 MG tablet Take 1 tablet (25 mg) by mouth daily 90 tablet 3     budesonide-formoterol (SYMBICORT) 80-4.5 MCG/ACT Inhaler Inhale 2 puffs into the lungs 2 times daily 1 Inhaler 3     LORazepam (ATIVAN) 1 MG tablet Take 1 tablet (1 mg) by mouth daily as needed for anxiety 20 tablet 0     order for DME CPAP 11 cm 1 Units 1     albuterol (2.5 MG/3ML) 0.083% neb solution INHALE CONTENTS OF 1 VIAL VIA NEBULIZER EVERY 4 HOURS AS NEEDED FOR WHEEZING.  2     albuterol (PROAIR HFA, PROVENTIL HFA, VENTOLIN HFA) 108 (90 BASE) MCG/ACT inhaler Inhale 2 puffs into the lungs every 4 hours as needed for shortness of breath / dyspnea 1 Inhaler 5     FLUoxetine (PROZAC) 40 MG capsule TAKE 1 CAPSULE BY MOUTH ONCE DAILY (Patient not taking: Reported on 1/4/2018) 90 capsule 0     [DISCONTINUED] amLODIPine (NORVASC) 5 MG tablet Take 1 tablet (5 mg) by mouth daily 30 tablet 1     [DISCONTINUED] atenolol (TENORMIN) 25 MG tablet Take 1 tablet (25 mg) by mouth daily 30 tablet 1     [DISCONTINUED] FLUoxetine (PROZAC) 20 MG capsule Take 3 capsules (60 mg) by mouth daily 90 capsule 1     loratadine (CLARITIN) 10 MG tablet  "Take 10 mg by mouth       Allergies   Allergen Reactions     No Known Allergies          ROS:  Constitutional, neuro, ENT, endocrine, pulmonary, cardiac, gastrointestinal, genitourinary, musculoskeletal, integument and psychiatric systems are negative, except as otherwise noted.    This document serves as a record of the services and decisions personally performed and made by Belkis Chadwick DNP. It was created on her behalf by Kati Mac, a trained medical scribe. The creation of this document is based on the provider's statements to the medical scribe.  Kati Mac 9:27 AM January 4, 2018    OBJECTIVE:                                                    /84  Pulse 84  Temp 98.5  F (36.9  C) (Temporal)  Resp 14  Ht 1.88 m (6' 2\")  Wt (!) 168.7 kg (372 lb)  BMI 47.76 kg/m2  Body mass index is 47.76 kg/(m^2).  GENERAL APPEARANCE: healthy, alert and no distress  HENT: ear canals and TM's normal and nose and mouth without ulcers or lesions, extremely narrow oral pharynx   NECK: no adenopathy, no asymmetry, masses, or scars and thyroid normal to palpation  RESP: lungs clear to auscultation - no rales, rhonchi or wheezes  CV: regular rates and rhythm, normal S1 S2, no S3 or S4 and no murmur, click or rub  NEURO: Normal strength and tone, mentation intact and speech normal  PSYCH: mentation appears normal and affect normal/bright    Diagnostic test results:  Diagnostic Test Results:  No results found for this or any previous visit (from the past 24 hour(s)).       ASSESSMENT/PLAN:                                                        ICD-10-CM    1. Mild intermittent asthma without complication J45.20    2. KRISTINA (generalized anxiety disorder) F41.1 FLUoxetine (PROZAC) 20 MG capsule   3. BMI over 45 E66.01    4. Essential hypertension I10 amLODIPine (NORVASC) 5 MG tablet     atenolol (TENORMIN) 25 MG tablet     **Basic metabolic panel FUTURE 2mo   5. ADRIANNA (obstructive sleep apnea)- severe (AHI 82) G47.33    6. " Mild intermittent asthma with exacerbation J45.21 predniSONE (DELTASONE) 20 MG tablet     budesonide-formoterol (SYMBICORT) 80-4.5 MCG/ACT Inhaler   7. Lipid screening Z13.220 Lipid panel reflex to direct LDL Fasting     Reviewed asthma symptoms. Patient has been sick recently. Will start Prednisone 20mg and Symbicort 80-4.5mcg instead of Advair. Directed patient to take 1 pill in the morning, and 1 at night or can take both in the morning. Warned it can make jittery, and need to take with food.     Anxiety is stable. He is doing well on Prozac 20mg. Refilled Prozac 20mg.     Counseled patient to exercise through a walking program.    Reviewed Hypertension care. Refilled Norvasc 5mg and Tenormin 25mg. Patient will complete lab work in the future. Continue to strive for more exercise and weight management advised.     Patient is wearing a sleep mask for OSP.     Order placed for labs that the patient will complete today. Will notify with results.     Follow up with Provider - 6-12 months with PE. And asthma as needed. Future ACT in 3 weeks- pt. Has been given form at this visit.     All questions invited, asked and answered to the patient's apparent satisfaction.  Patient agrees to plan.      The information in this document, created by the medical scribe for me, accurately reflects the services I personally performed and the decisions made by me. I have reviewed and approved this document for accuracy prior to leaving the patient care area.  January 4, 2018 9:37 AM    LAWSON Hernandez [facility]

## 2018-01-04 ENCOUNTER — TELEPHONE (OUTPATIENT)
Dept: FAMILY MEDICINE | Facility: CLINIC | Age: 38
End: 2018-01-04

## 2018-01-04 ENCOUNTER — OFFICE VISIT (OUTPATIENT)
Dept: FAMILY MEDICINE | Facility: CLINIC | Age: 38
End: 2018-01-04
Payer: COMMERCIAL

## 2018-01-04 VITALS
DIASTOLIC BLOOD PRESSURE: 84 MMHG | HEIGHT: 74 IN | SYSTOLIC BLOOD PRESSURE: 134 MMHG | HEART RATE: 84 BPM | WEIGHT: 315 LBS | RESPIRATION RATE: 14 BRPM | TEMPERATURE: 98.5 F | BODY MASS INDEX: 40.43 KG/M2

## 2018-01-04 DIAGNOSIS — J45.20 MILD INTERMITTENT ASTHMA WITHOUT COMPLICATION: Primary | Chronic | ICD-10-CM

## 2018-01-04 DIAGNOSIS — E11.59 TYPE 2 DIABETES MELLITUS WITH OTHER CIRCULATORY COMPLICATION, WITHOUT LONG-TERM CURRENT USE OF INSULIN (H): ICD-10-CM

## 2018-01-04 DIAGNOSIS — Z13.220 LIPID SCREENING: ICD-10-CM

## 2018-01-04 DIAGNOSIS — F41.1 GAD (GENERALIZED ANXIETY DISORDER): Chronic | ICD-10-CM

## 2018-01-04 DIAGNOSIS — J45.21 MILD INTERMITTENT ASTHMA WITH EXACERBATION: ICD-10-CM

## 2018-01-04 DIAGNOSIS — E66.01 MORBID OBESITY DUE TO EXCESS CALORIES (H): Chronic | ICD-10-CM

## 2018-01-04 DIAGNOSIS — I10 ESSENTIAL HYPERTENSION: Chronic | ICD-10-CM

## 2018-01-04 DIAGNOSIS — G47.33 OSA (OBSTRUCTIVE SLEEP APNEA): Chronic | ICD-10-CM

## 2018-01-04 LAB
ANION GAP SERPL CALCULATED.3IONS-SCNC: 10 MMOL/L (ref 3–14)
BUN SERPL-MCNC: 15 MG/DL (ref 7–30)
CALCIUM SERPL-MCNC: 8.9 MG/DL (ref 8.5–10.1)
CHLORIDE SERPL-SCNC: 103 MMOL/L (ref 94–109)
CHOLEST SERPL-MCNC: 146 MG/DL
CO2 SERPL-SCNC: 26 MMOL/L (ref 20–32)
CREAT SERPL-MCNC: 0.76 MG/DL (ref 0.66–1.25)
GFR SERPL CREATININE-BSD FRML MDRD: >90 ML/MIN/1.7M2
GLUCOSE SERPL-MCNC: 154 MG/DL (ref 70–99)
HDLC SERPL-MCNC: 39 MG/DL
LDLC SERPL CALC-MCNC: 78 MG/DL
NONHDLC SERPL-MCNC: 107 MG/DL
POTASSIUM SERPL-SCNC: 4 MMOL/L (ref 3.4–5.3)
SODIUM SERPL-SCNC: 139 MMOL/L (ref 133–144)
TRIGL SERPL-MCNC: 147 MG/DL

## 2018-01-04 PROCEDURE — 80048 BASIC METABOLIC PNL TOTAL CA: CPT | Performed by: NURSE PRACTITIONER

## 2018-01-04 PROCEDURE — 36415 COLL VENOUS BLD VENIPUNCTURE: CPT | Performed by: NURSE PRACTITIONER

## 2018-01-04 PROCEDURE — 99214 OFFICE O/P EST MOD 30 MIN: CPT | Performed by: NURSE PRACTITIONER

## 2018-01-04 PROCEDURE — 80061 LIPID PANEL: CPT | Performed by: NURSE PRACTITIONER

## 2018-01-04 RX ORDER — METFORMIN HCL 500 MG
500 TABLET, EXTENDED RELEASE 24 HR ORAL
Qty: 90 TABLET | Refills: 0 | Status: SHIPPED | OUTPATIENT
Start: 2018-01-04 | End: 2018-04-10

## 2018-01-04 RX ORDER — AMLODIPINE BESYLATE 5 MG/1
5 TABLET ORAL DAILY
Qty: 90 TABLET | Refills: 3 | Status: SHIPPED | OUTPATIENT
Start: 2018-01-04 | End: 2019-05-03

## 2018-01-04 RX ORDER — ATENOLOL 25 MG/1
25 TABLET ORAL DAILY
Qty: 90 TABLET | Refills: 3 | Status: SHIPPED | OUTPATIENT
Start: 2018-01-04 | End: 2019-01-23

## 2018-01-04 RX ORDER — PREDNISONE 20 MG/1
20 TABLET ORAL 2 TIMES DAILY
Qty: 10 TABLET | Refills: 0 | Status: SHIPPED | OUTPATIENT
Start: 2018-01-04 | End: 2019-05-03

## 2018-01-04 RX ORDER — BUDESONIDE AND FORMOTEROL FUMARATE DIHYDRATE 80; 4.5 UG/1; UG/1
2 AEROSOL RESPIRATORY (INHALATION) 2 TIMES DAILY
Qty: 1 INHALER | Refills: 3 | Status: SHIPPED | OUTPATIENT
Start: 2018-01-04 | End: 2019-10-24

## 2018-01-04 ASSESSMENT — ANXIETY QUESTIONNAIRES
GAD7 TOTAL SCORE: 2
3. WORRYING TOO MUCH ABOUT DIFFERENT THINGS: NOT AT ALL
7. FEELING AFRAID AS IF SOMETHING AWFUL MIGHT HAPPEN: SEVERAL DAYS
GAD7 TOTAL SCORE: 2
6. BECOMING EASILY ANNOYED OR IRRITABLE: NOT AT ALL
4. TROUBLE RELAXING: SEVERAL DAYS
1. FEELING NERVOUS, ANXIOUS, OR ON EDGE: NOT AT ALL
7. FEELING AFRAID AS IF SOMETHING AWFUL MIGHT HAPPEN: SEVERAL DAYS
2. NOT BEING ABLE TO STOP OR CONTROL WORRYING: NOT AT ALL
5. BEING SO RESTLESS THAT IT IS HARD TO SIT STILL: NOT AT ALL
GAD7 TOTAL SCORE: 2

## 2018-01-04 ASSESSMENT — PAIN SCALES - GENERAL: PAINLEVEL: NO PAIN (0)

## 2018-01-04 ASSESSMENT — PATIENT HEALTH QUESTIONNAIRE - PHQ9
10. IF YOU CHECKED OFF ANY PROBLEMS, HOW DIFFICULT HAVE THESE PROBLEMS MADE IT FOR YOU TO DO YOUR WORK, TAKE CARE OF THINGS AT HOME, OR GET ALONG WITH OTHER PEOPLE: NOT DIFFICULT AT ALL
SUM OF ALL RESPONSES TO PHQ QUESTIONS 1-9: 3
SUM OF ALL RESPONSES TO PHQ QUESTIONS 1-9: 3

## 2018-01-04 NOTE — TELEPHONE ENCOUNTER
Per KL please call patient and complete asthma control test over the phone with patient (pt have a copy of the ACT form).

## 2018-01-04 NOTE — PATIENT INSTRUCTIONS
Start taking Prednisone 20mg and Symbicort 80-4.5mcg. Take with food, it may make you jittery.     Continue Prozac 20mg    Start walking program.     Continue Norvasc 5mg and Tenormin 25mg.     Will notify with lab results.

## 2018-01-04 NOTE — MR AVS SNAPSHOT
"              After Visit Summary   1/4/2018    Josué Franco    MRN: [id number]           Patient Information     Date Of Birth          1980        Visit Information        Provider Department      1/4/2018 9:00 AM Belkis Chadwick APRN CNP [facility]        Today's Diagnoses     Mild intermittent asthma without complication    -  1    KRISTINA (generalized anxiety disorder)        BMI over 45        Essential hypertension        ADRIANNA (obstructive sleep apnea)- severe (AHI 82)        Mild intermittent asthma with exacerbation        Lipid screening           Follow-ups after your visit        Who to contact     If you have questions or need follow up information about today's clinic visit or your schedule please [facility] at [phone].  Normal or non-critical lab and imaging results will be communicated to you by MyChart, letter or phone within 4 business days after the clinic has received the results. If you do not hear from us within 7 days, please contact the clinic through [employer] or phone. If you have a critical or abnormal lab result, we will notify you by phone as soon as possible.  Submit refill requests through [employer] or call your pharmacy and they will forward the refill request to us. Please allow 3 business days for your refill to be completed.          Additional Information About Your Visit        MyChart Information     [employer] lets you send messages to your doctor, view your test results, renew your prescriptions, schedule appointments and more. To sign up, go to [email or web address] . Click on \"Log in\" on the left side of the screen, which will take you to the Welcome page. Then click on \"Sign up Now\" on the right side of the page.     You will be asked to enter the access code listed below, as well as some personal information. Please follow the directions to create your username and password.     Your access code is: [email or web address]  Expires: " "2/15/2018  5:00 PM     Your access code will  in 90 days. If you need help or a new code, please call your [place] clinic or [phone].        Care EveryWhere ID     This is your Care EveryWhere ID. This could be used by other organizations to access your [place] medical records  [id number]        Your Vitals Were     Pulse Temperature Respirations Height BMI (Body Mass Index)       84 98.5  F (36.9  C) (Temporal) 14 6' 2\" (1.88 m) 47.76 kg/m2        Blood Pressure from Last 3 Encounters:   18 134/84   17 140/90   17 137/88    Weight from Last 3 Encounters:   18 (!) 372 lb (168.7 kg)   17 (!) 372 lb 14.4 oz (169.1 kg)   17 (!) 373 lb (169.2 kg)              We Performed the Following     **Basic metabolic panel FUTURE 2mo     Lipid panel reflex to direct LDL Fasting          Today's Medication Changes          These changes are accurate as of: 18  9:33 AM.  If you have any questions, ask your nurse or doctor.               Start taking these medicines.        Dose/Directions    budesonide-formoterol 80-4.5 MCG/ACT Inhaler   Commonly known as:  SYMBICORT   Used for:  Mild intermittent asthma with exacerbation   Started by:  Belkis Chadwick APRN CNP        Dose:  2 puff   Inhale 2 puffs into the lungs 2 times daily   Quantity:  1 Inhaler   Refills:  3       predniSONE 20 MG tablet   Commonly known as:  DELTASONE   Used for:  Mild intermittent asthma with exacerbation   Started by:  Belkis Chadwick APRN CNP        Dose:  20 mg   Take 1 tablet (20 mg) by mouth 2 times daily   Quantity:  10 tablet   Refills:  0            Where to get your medicines      These medications were sent to [place] [address] [place] [facility] [place] [address] [address]  [facility]  [address] [address] [facility] [address]     Phone:  [phone]     amLODIPine 5 MG tablet    atenolol 25 MG tablet    budesonide-formoterol 80-4.5 MCG/ACT Inhaler    FLUoxetine 20 MG capsule    predniSONE 20 MG tablet                Primary " Care Provider Office Phone # Fax #    LAWSON Hicks EYAD [phone] [address] [facility] [address]        Equal Access to Services     RUDDY LUZ : Hadii aad ku hadgloriao Soomaali, waaxda luqadaha, qaybta kaalmada adeegyada, naheed humphries laVickyoriana card. So [facility] [phone].    ATENCIÓN: Si habla español, tiene a gaxiola disposición servicios gratuitos de asistencia lingüística. Llame al [phone].    We comply with applicable federal civil rights laws and [place] laws. We do not discriminate on the basis of race, color, national origin, age, disability, sex, sexual orientation, or gender identity.            Thank you!     Thank you for choosing [facility]  for your care. Our goal is always to provide you with excellent care. Hearing back from our patients is one way we can continue to improve our services. Please take a few minutes to complete the written survey that you may receive in the mail after your visit with us. Thank you!             Your Updated Medication List - Protect others around you: Learn how to safely use, store and throw away your medicines at www.disposemymeds.org.          This list is accurate as of: 1/4/18  9:33 AM.  Always use your most recent med list.                   Brand Name Dispense Instructions for use Diagnosis    * albuterol 108 (90 BASE) MCG/ACT Inhaler    PROAIR HFA/PROVENTIL HFA/VENTOLIN HFA    1 Inhaler    Inhale 2 puffs into the lungs every 4 hours as needed for shortness of breath / dyspnea    Intermittent asthma, uncomplicated       * albuterol (2.5 MG/3ML) 0.083% neb solution      INHALE CONTENTS OF 1 VIAL VIA NEBULIZER EVERY 4 HOURS AS NEEDED FOR WHEEZING.        amLODIPine 5 MG tablet    NORVASC    90 tablet    Take 1 tablet (5 mg) by mouth daily    Essential hypertension       atenolol 25 MG tablet    TENORMIN    90 tablet    Take 1 tablet (25 mg) by mouth daily    Essential hypertension        budesonide-formoterol 80-4.5 MCG/ACT Inhaler    SYMBICORT    1 Inhaler    Inhale 2 puffs into the lungs 2 times daily    Mild intermittent asthma with exacerbation       * FLUoxetine 40 MG capsule    PROzac    90 capsule    TAKE 1 CAPSULE BY MOUTH ONCE DAILY    KRISTINA (generalized anxiety disorder)       * FLUoxetine 20 MG capsule    PROzac    270 capsule    Take 3 capsules (60 mg) by mouth daily    KRISTINA (generalized anxiety disorder)       loratadine 10 MG tablet    CLARITIN     Take 10 mg by mouth        LORazepam 1 MG tablet    ATIVAN    20 tablet    Take 1 tablet (1 mg) by mouth daily as needed for anxiety    Anxiety       order for DME     1 Units    CPAP 11 cm    ADRIANNA (obstructive sleep apnea)       predniSONE 20 MG tablet    DELTASONE    10 tablet    Take 1 tablet (20 mg) by mouth 2 times daily    Mild intermittent asthma with exacerbation       * Notice:  This list has 4 medication(s) that are the same as other medications prescribed for you. Read the directions carefully, and ask your doctor or other care provider to review them with you.

## 2018-01-04 NOTE — NURSING NOTE
"Chief Complaint   Patient presents with     Recheck Medication     Panel Management     jhon, phq, ACT        Initial /84  Pulse 84  Temp 98.5  F (36.9  C) (Temporal)  Resp 14  Ht 6' 2\" (1.88 m)  Wt (!) 372 lb (168.7 kg)  BMI 47.76 kg/m2 Estimated body mass index is 47.76 kg/(m^2) as calculated from the following:    Height as of this encounter: 6' 2\" (1.88 m).    Weight as of this encounter: 372 lb (168.7 kg).  Medication Reconciliation: complete  "

## 2018-01-05 ASSESSMENT — ANXIETY QUESTIONNAIRES: GAD7 TOTAL SCORE: 2

## 2018-01-05 ASSESSMENT — ASTHMA QUESTIONNAIRES: ACT_TOTALSCORE: 11

## 2018-01-05 NOTE — PROGRESS NOTES
New diagnosis of DM, starting metformin ER QD, DM education and re-check in 1 months for lisinopril changes and statin recommendation. Belkis Chadwick

## 2018-01-16 DIAGNOSIS — I10 ESSENTIAL HYPERTENSION: ICD-10-CM

## 2018-01-17 RX ORDER — ATENOLOL 50 MG/1
TABLET ORAL
Qty: 15 TABLET | Refills: 1 | OUTPATIENT
Start: 2018-01-17

## 2018-01-17 NOTE — TELEPHONE ENCOUNTER
Refill not appropriate.  Rx sent to the requesting pharmacy on 1/4/18 for a 3 month supply with an additional 3 refills.    Joanne Sky RN

## 2018-01-26 ASSESSMENT — ASTHMA QUESTIONNAIRES: ACT_TOTALSCORE: 20

## 2018-03-05 ENCOUNTER — TELEPHONE (OUTPATIENT)
Dept: FAMILY MEDICINE | Facility: CLINIC | Age: 38
End: 2018-03-05

## 2018-03-05 DIAGNOSIS — E11.59 TYPE 2 DIABETES MELLITUS WITH OTHER CIRCULATORY COMPLICATION, WITHOUT LONG-TERM CURRENT USE OF INSULIN (H): Primary | ICD-10-CM

## 2018-03-05 NOTE — LETTER
[facility]  [address] [address], [address]  [place] [address]  Phone: [phone]  Fax: [phone]  April 5, 2018      Josué Franco  [address] [address] [place], [place] [address] [address]      Dear Josué,    We care about your health and have reviewed your health plan including your medical conditions, medications, and lab results.  Based on this review, it is recommended that you follow up regarding the following health topic(s):  -Diabetes    We recommend you take the following action(s):  -schedule a LAB ONLY APPOINTMENT to recheck your: A1c, Microablumin and TSH (thyroid test) within the next 1-4 weeks.  If' you have had labs completed eslewhere, please let us know so we can update your records.       Please call us at the [facility] - [phone] (or use [employer]) to address the above recommendations.     Thank you for trusting [facility] and we appreciate the opportunity to serve you.  We look forward to supporting your healthcare needs in the future.    Healthy Regards,    Your Health Care Team  [facility]

## 2018-03-05 NOTE — TELEPHONE ENCOUNTER
Summary:    Patient is due/failing the following:   Microalbumin, TSH and A1C    Action needed:   Patient needs non-fasting lab only appointment    Type of outreach:    Phone, spoke to patient.  patient scheduled    Questions for provider review:    None                                                                                                                                    Oly Rowan     Chart routed to Care Team .        Panel Management Review      Patient has the following on his problem list:     Diabetes    ASA:     Last A1C  No results found for: A1C  A1C tested: FAILED    Last LDL:    Lab Results   Component Value Date    CHOL 146 01/04/2018     Lab Results   Component Value Date    HDL 39 01/04/2018     Lab Results   Component Value Date    LDL 78 01/04/2018     Lab Results   Component Value Date    TRIG 147 01/04/2018     No results found for: CHOLHDLRATIO  Lab Results   Component Value Date    NHDL 107 01/04/2018       Is the patient on a Statin? NO             Is the patient on Aspirin? NO        Last three blood pressure readings:  BP Readings from Last 3 Encounters:   01/04/18 134/84   11/17/17 140/90   07/31/17 137/88            Tobacco History:     History   Smoking Status     Never Smoker   Smokeless Tobacco     Never Used         Hypertension   Last three blood pressure readings:  BP Readings from Last 3 Encounters:   01/04/18 134/84   11/17/17 140/90   07/31/17 137/88     Blood pressure: Passed    HTN Guidelines:  Age 18-59 BP range:  Less than 140/90  Age 60-85 with Diabetes:  Less than 140/90  Age 60-85 without Diabetes:  less than 150/90      Composite cancer screening  Chart review shows that this patient is due/due soon for the following None

## 2018-04-02 DIAGNOSIS — F41.1 GAD (GENERALIZED ANXIETY DISORDER): ICD-10-CM

## 2018-04-03 RX ORDER — FLUOXETINE 40 MG/1
CAPSULE ORAL
Qty: 90 CAPSULE | Refills: 0 | OUTPATIENT
Start: 2018-04-03

## 2018-04-07 DIAGNOSIS — F41.1 GAD (GENERALIZED ANXIETY DISORDER): ICD-10-CM

## 2018-04-09 RX ORDER — FLUOXETINE 40 MG/1
CAPSULE ORAL
Qty: 90 CAPSULE | Refills: 0 | OUTPATIENT
Start: 2018-04-09

## 2018-04-09 NOTE — TELEPHONE ENCOUNTER
Called pharmacy and advised them of the message. They had removed the refill request.     Loan Morton MA

## 2018-04-09 NOTE — TELEPHONE ENCOUNTER
Prozac:  Second request for incorrect dose - please call pharmacy to have them fill prescription from 1/4/18.    Mely Ceballos, RN, BSN

## 2018-04-10 DIAGNOSIS — E11.59 TYPE 2 DIABETES MELLITUS WITH OTHER CIRCULATORY COMPLICATION, WITHOUT LONG-TERM CURRENT USE OF INSULIN (H): ICD-10-CM

## 2018-04-10 RX ORDER — METFORMIN HCL 500 MG
500 TABLET, EXTENDED RELEASE 24 HR ORAL
Qty: 30 TABLET | Refills: 0 | Status: SHIPPED | OUTPATIENT
Start: 2018-04-10 | End: 2018-05-21

## 2018-04-10 NOTE — TELEPHONE ENCOUNTER
"Requested Prescriptions   Pending Prescriptions Disp Refills     metFORMIN (GLUCOPHAGE-XR) 500 MG 24 hr tablet [Pharmacy Med Name: METFORMIN  MG TABLET] 90 tablet 0     Sig: TAKE ONE TABLET BY MOUTH ONCE DAILY WITH DINNER    Biguanide Agents Failed    4/10/2018  1:15 AM       Failed - Patient has had a Microalbumin in the past 12 mos.    No lab results found.         Failed - Patient has documented A1c within the specified period of time.    No lab results found.         Passed - Blood pressure less than 140/90 in past 6 months    BP Readings from Last 3 Encounters:   01/04/18 134/84   11/17/17 140/90   07/31/17 137/88                Passed - Patient has documented LDL within the past 12 mos.    Recent Labs   Lab Test  01/04/18   0934   LDL  78            Passed - Patient is age 10 or older       Passed - Patient's CR is NOT>1.4 OR Patient's EGFR is NOT<45 within past 12 mos.    Recent Labs   Lab Test  01/04/18   0934   GFRESTIMATED  >90   GFRESTBLACK  >90       Recent Labs   Lab Test  01/04/18   0934   CR  0.76            Passed - Patient does NOT have a diagnosis of CHF.       Passed - Recent (6 mo) or future (30 days) visit within the authorizing provider's specialty    Patient had office visit in the last 6 months or has a visit in the next 30 days with authorizing provider or within the authorizing provider's specialty.  See \"Patient Info\" tab in inbasket, or \"Choose Columns\" in Meds & Orders section of the refill encounter.            Routing refill request to provider for review/approval because:  No microalbumin or A1C on file.  Per 1/4/18 OV, new dx of DM, pt to f/u in one month.    Joanne Sky RN          "

## 2018-04-13 DIAGNOSIS — E11.59 TYPE 2 DIABETES MELLITUS WITH OTHER CIRCULATORY COMPLICATION, WITHOUT LONG-TERM CURRENT USE OF INSULIN (H): ICD-10-CM

## 2018-04-13 LAB — HBA1C MFR BLD: 6.7 % (ref 0–6.4)

## 2018-04-13 PROCEDURE — 36415 COLL VENOUS BLD VENIPUNCTURE: CPT | Performed by: NURSE PRACTITIONER

## 2018-04-13 PROCEDURE — 83036 HEMOGLOBIN GLYCOSYLATED A1C: CPT | Performed by: NURSE PRACTITIONER

## 2018-04-13 PROCEDURE — 82043 UR ALBUMIN QUANTITATIVE: CPT | Performed by: NURSE PRACTITIONER

## 2018-04-13 PROCEDURE — 84443 ASSAY THYROID STIM HORMONE: CPT | Performed by: NURSE PRACTITIONER

## 2018-04-16 ENCOUNTER — TELEPHONE (OUTPATIENT)
Dept: FAMILY MEDICINE | Facility: CLINIC | Age: 38
End: 2018-04-16

## 2018-04-16 LAB
CREAT UR-MCNC: 179 MG/DL
MICROALBUMIN UR-MCNC: 15 MG/L
MICROALBUMIN/CREAT UR: 8.6 MG/G CR (ref 0–17)
TSH SERPL DL<=0.005 MIU/L-ACNC: 3.48 MU/L (ref 0.4–4)

## 2018-04-16 NOTE — TELEPHONE ENCOUNTER
Left message asking patient to return call.  Please inform patient of RESULTS from Provider below.     All labs in good range. Stay on medications, unchanged. Schedule re-check next month for DM. Belkis Chadwick

## 2018-05-21 DIAGNOSIS — E11.59 TYPE 2 DIABETES MELLITUS WITH OTHER CIRCULATORY COMPLICATION, WITHOUT LONG-TERM CURRENT USE OF INSULIN (H): ICD-10-CM

## 2018-05-22 RX ORDER — METFORMIN HCL 500 MG
TABLET, EXTENDED RELEASE 24 HR ORAL
Qty: 14 TABLET | Refills: 0 | Status: SHIPPED | OUTPATIENT
Start: 2018-05-22 | End: 2019-02-21

## 2018-05-22 NOTE — TELEPHONE ENCOUNTER
Pending Prescriptions:                       Disp   Refills    metFORMIN (GLUCOPHAGE-XR) 500 MG 24 hr ta*30 tab*0            Sig: TAKE 1 TABLET BY MOUTH ONCE DAILY (WITH DINNER)           DUE FOR OFFICE VISIT FOR REFILLS.    Labs last month, but was reminded again that he's due for an office visit.  Naomi refill already approved, out of RN scope to approve again as future appt is not scheduled.    Johnathan Guillen, RN, BSN

## 2018-12-31 DIAGNOSIS — G47.33 OSA (OBSTRUCTIVE SLEEP APNEA): ICD-10-CM

## 2018-12-31 DIAGNOSIS — E66.01 MORBID OBESITY DUE TO EXCESS CALORIES (H): ICD-10-CM

## 2019-01-23 ENCOUNTER — TELEPHONE (OUTPATIENT)
Dept: FAMILY MEDICINE | Facility: CLINIC | Age: 39
End: 2019-01-23

## 2019-01-23 DIAGNOSIS — I10 ESSENTIAL HYPERTENSION: Chronic | ICD-10-CM

## 2019-01-23 RX ORDER — ATENOLOL 25 MG/1
TABLET ORAL
Qty: 30 TABLET | Refills: 0 | Status: SHIPPED | OUTPATIENT
Start: 2019-01-23 | End: 2019-02-21

## 2019-01-23 NOTE — TELEPHONE ENCOUNTER
"Requested Prescriptions   Pending Prescriptions Disp Refills     atenolol (TENORMIN) 25 MG tablet [Pharmacy Med Name: ATENOLOL 25 MG TABLET] 90 tablet 3     Sig: TAKE 1 TABLET BY MOUTH ONCE DAILY    Beta-Blockers Protocol Failed - 1/23/2019  4:30 AM       Failed - Blood pressure under 140/90 in past 12 months    BP Readings from Last 3 Encounters:   01/04/18 134/84   11/17/17 140/90   07/31/17 137/88          Failed - Recent (12 mo) or future (30 days) visit within the authorizing provider's specialty    Patient had office visit in the last 12 months or has a visit in the next 30 days with authorizing provider or within the authorizing provider's specialty.  See \"Patient Info\" tab in inbasket, or \"Choose Columns\" in Meds & Orders section of the refill encounter.         Passed - Patient is age 6 or older       Passed - Medication is active on med list        atenolol (TENORMIN) 25 MG tablet  Medication is being filled for 1 time refill only due to:  Patient needs to be seen because due for physical .     Please assist with scheduling.    Sonia Braden, RN, BSN         "

## 2019-01-24 DIAGNOSIS — F41.1 GAD (GENERALIZED ANXIETY DISORDER): Chronic | ICD-10-CM

## 2019-01-24 NOTE — TELEPHONE ENCOUNTER
"Requested Prescriptions   Pending Prescriptions Disp Refills     FLUoxetine (PROZAC) 20 MG capsule [Pharmacy Med Name: FLUOXETINE HCL 20 MG CAPSULE] 270 capsule 0     Sig: TAKE 3 CAPSULES BY MOUTH ONCE DAILY    SSRIs Protocol Failed - 1/24/2019  2:17 AM       Failed - Recent (12 mo) or future (30 days) visit within the authorizing provider's specialty    Patient had office visit in the last 12 months or has a visit in the next 30 days with authorizing provider or within the authorizing provider's specialty.  See \"Patient Info\" tab in inbasket, or \"Choose Columns\" in Meds & Orders section of the refill encounter.             Passed - Medication is active on med list       Passed - Patient is age 18 or older        Last ov 01/04/2018    Medication is being filled for 1 time refill only due to:  Patient needs to be seen because it has been more than one year since last visit.     Patient is due for a follow up chronic disease management appointment.   Please call and help schedule.   Thank You!    Kev Luna, RN, BSN            "

## 2019-02-01 ENCOUNTER — TRANSFERRED RECORDS (OUTPATIENT)
Dept: HEALTH INFORMATION MANAGEMENT | Facility: CLINIC | Age: 39
End: 2019-02-01

## 2019-02-01 LAB — RETINOPATHY: NORMAL

## 2019-02-20 NOTE — PROGRESS NOTES
SUBJECTIVE:     History of Present Illness     Diet:  Regular (no restrictions)  Frequency of exercise:  1 day/week  Duration of exercise:  Less than 15 minutes  Taking medications regularly:  Yes  Medication side effects:  None  Additional concerns today:  No    Diabetes Follow-up      Patient is checking blood sugars: not at all    Diabetic concerns: None     Symptoms of hypoglycemia (low blood sugar): none     Paresthesias (numbness or burning in feet) or sores: No     Date of last diabetic eye exam: 9 months ago    BP Readings from Last 2 Encounters:   02/21/19 122/78   01/04/18 134/84     Hemoglobin A1C (%)   Date Value   02/21/2019 7.6 (H)   04/13/2018 6.7 (H)     LDL Cholesterol Calculated (mg/dL)   Date Value   02/21/2019 60   01/04/2018 78     Diabetes Management Resources  Hypertension Follow-up      Outpatient blood pressures are being checked at work.  Results are 130's/80's.    Low Salt Diet: low salt    Anxiety Follow-Up    Status since last visit: No change    Other associated symptoms:None    Complicating factors:   Significant life event: No   Current substance abuse: None  Depression symptoms: No  KRISTINA-7 SCORE 6/30/2017 11/17/2017 1/4/2018   Total Score - - -   Total Score 0 (minimal anxiety) 4 (minimal anxiety) 2 (minimal anxiety)   Total Score 0 4 2       KRISTINA-7  Asthma Follow-Up    Was ACT completed today?    Yes    ACT Total Scores 2/21/2019   ACT TOTAL SCORE -   ASTHMA ER VISITS -   ASTHMA HOSPITALIZATIONS -   ACT TOTAL SCORE (Goal Greater than or Equal to 20) 23   In the past 12 months, how many times did you visit the emergency room for your asthma without being admitted to the hospital? 0   In the past 12 months, how many times were you hospitalized overnight because of your asthma? 0       Recent asthma triggers that patient is dealing with: None    Josué Franco is a 38 year old male who presents to clinic today for the following health issues:  Patient has not taken Metformin for 2-3  weeks, because he ran out of refills for his medications. He is inferring that his glucose levels will be high this morning. His blood pressure is pretty controlled. His cholesterol levels are unstable, will start on cholesterol medication today. He is able to urinate for labs.  He is taking 1 Metformin a day. Denies swelling in extremities.  Patient is overdue for his routine visit.     Breathing/ Weight   His breathing has improved, reports being out of breath after walking long distances.   Patient reports that his weight has been stable. He is also restarting to exercise and eat healthier meals.    Mood  His mood has been good. Reports doing well on his current dose of Prozac. He has been sleeping pretty well.     Social History   He is travelling to [place] in 1 week. He is staying at the [facility] in [facility].     Problem list and histories reviewed & adjusted, as indicated.  Additional history: as documented    Patient Active Problem List   Diagnosis     INTERMITTENT ASTHMA      CARDIOVASCULAR SCREENING; LDL GOAL LESS THAN 160     Oligospermia     Testicular hypofunction     KRISTINA (generalized anxiety disorder)     BMI over 45     Essential hypertension     ADRIANNA (obstructive sleep apnea)- severe (AHI 82)     Type 2 diabetes mellitus with other circulatory complication, without long-term current use of insulin (H)     Non compliance w medication regimen     Past Surgical History:   Procedure Laterality Date     CHOLECYSTECTOMY  1997     LITHOTRIPSY  7/08    Lithotrypsy     NISSEN FUNDOPLICATION  2001    Nissen Fundiplication       Social History     Tobacco Use     Smoking status: Never Smoker     Smokeless tobacco: Never Used   Substance Use Topics     Alcohol use: No     Comment: Very moderate 0-1/week     Family History   Problem Relation Age of Onset     Coronary Artery Disease Father      Diabetes No family hx of      Hypertension No family hx of      Colon Cancer No family hx of          Current Outpatient  Medications   Medication Sig Dispense Refill     albuterol (2.5 MG/3ML) 0.083% neb solution INHALE CONTENTS OF 1 VIAL VIA NEBULIZER EVERY 4 HOURS AS NEEDED FOR WHEEZING.  2     albuterol (PROAIR HFA/PROVENTIL HFA/VENTOLIN HFA) 108 (90 Base) MCG/ACT inhaler Inhale 2 puffs into the lungs every 4 hours as needed for shortness of breath / dyspnea 1 Inhaler 5     aspirin (ASA) 81 MG EC tablet Take 1 tablet (81 mg) by mouth daily 90 tablet 3     atenolol (TENORMIN) 25 MG tablet Take 1 tablet (25 mg) by mouth daily 90 tablet 0     FLUoxetine (PROZAC) 20 MG capsule Take 3 capsules (60 mg) by mouth daily 270 capsule 0     LORazepam (ATIVAN) 1 MG tablet Take 1 tablet (1 mg) by mouth daily as needed for anxiety 20 tablet 0     metFORMIN (GLUCOPHAGE-XR) 500 MG 24 hr tablet TAKE 1 TABLET BY MOUTH ONCE DAILY (WITH DINNER) DUE FOR OFFICE VISIT FOR REFILLS. 90 tablet 0     order for DME CPAP 11 cm 1 Units 1     simvastatin (ZOCOR) 20 MG tablet Take 1 tablet (20 mg) by mouth At Bedtime 30 tablet 1     amLODIPine (NORVASC) 5 MG tablet Take 1 tablet (5 mg) by mouth daily (Patient not taking: Reported on 2/21/2019) 90 tablet 3     budesonide-formoterol (SYMBICORT) 80-4.5 MCG/ACT Inhaler Inhale 2 puffs into the lungs 2 times daily (Patient not taking: Reported on 2/21/2019) 1 Inhaler 3     FLUoxetine (PROZAC) 40 MG capsule TAKE 1 CAPSULE BY MOUTH ONCE DAILY (Patient not taking: Reported on 1/4/2018) 90 capsule 0     loratadine (CLARITIN) 10 MG tablet Take 10 mg by mouth       predniSONE (DELTASONE) 20 MG tablet Take 1 tablet (20 mg) by mouth 2 times daily (Patient not taking: Reported on 2/21/2019.) 10 tablet 0     Allergies   Allergen Reactions     No Known Allergies      Recent Labs   Lab Test 02/21/19  0900 04/13/18  1637 01/04/18  0934 05/25/17  1006 04/21/16  1149   A1C 7.6* 6.7*  --   --   --    LDL 60  --  78 74 69   HDL 33*  --  39* 41 36*   TRIG 177*  --  147 148 146   ALT 24  --   --   --   --    CR 0.76  --  0.76 0.72  --   "  GFRESTIMATED >90  --  >90 >90  Non African American GFR Calc    --    GFRESTBLACK >90  --  >90 >90  African American GFR Calc    --    POTASSIUM 4.1  --  4.0 3.9  --    TSH  --  3.48  --   --  2.27      BP Readings from Last 3 Encounters:   02/21/19 122/78   01/04/18 134/84   11/17/17 140/90    Wt Readings from Last 3 Encounters:   02/21/19 (!) 169.3 kg (373 lb 3.2 oz)   01/04/18 (!) 168.7 kg (372 lb)   11/17/17 (!) 169.1 kg (372 lb 14.4 oz)        Labs reviewed in EPIC    ROS:  Constitutional, neuro, ENT, endocrine, pulmonary, cardiac, gastrointestinal, genitourinary, musculoskeletal, integument and psychiatric systems are negative, except as otherwise noted.    This document serves as a record of the services and decisions personally performed and made by Belkis Chadwick CNP. It was created on his/her behalf by Keyon Simpson, trained medical scribe. The creation of this document is based the provider's statements to the medical scribes.    James Simpson 9:05 AM, February 21, 2019    OBJECTIVE:                                                    /78   Pulse 66   Temp 97.9  F (36.6  C) (Temporal)   Resp 18   Ht 1.9 m (6' 2.8\")   Wt (!) 169.3 kg (373 lb 3.2 oz)   SpO2 96%   BMI 46.89 kg/m    Body mass index is 46.89 kg/m .  GENERAL APPEARANCE: healthy, alert and no distress  EYES: Eyes grossly normal to inspection, PERRL and conjunctivae and sclerae normal  HENT: small crowded oropharynx, ear canals and TM's normal and nose and mouth without ulcers or lesions  NECK: no adenopathy, no asymmetry, masses, or scars and thyroid normal to palpation  RESP: lungs clear to auscultation - no rales, rhonchi or wheezes  CV: regular rates and rhythm, normal S1 S2, no S3 or S4 and no murmur, click or rub  SKIN: no suspicious lesions or rashes  NEURO: Normal strength and tone, mentation intact and speech normal  DIABETIC FOOT EXAM: normal DP and PT pulses, no trophic changes or ulcerative lesions " and normal sensory exam  PSYCH: mentation appears normal and affect normal/bright    Diagnostic Test Results:  No results found for this or any previous visit (from the past 24 hour(s)).     ASSESSMENT/PLAN:                                                        ICD-10-CM    1. Type 2 diabetes mellitus with other circulatory complication, without long-term current use of insulin (H) E11.59 HEMOGLOBIN A1C     Lipid panel reflex to direct LDL Fasting     metFORMIN (GLUCOPHAGE-XR) 500 MG 24 hr tablet     simvastatin (ZOCOR) 20 MG tablet     Lipid panel reflex to direct LDL Fasting     ALT     AST     Albumin Random Urine Quantitative with Creat Ratio     Comprehensive metabolic panel   2. Screening for diabetic peripheral neuropathy Z13.89 FOOT EXAM  NO CHARGE [14240.114]   3. Need for prophylactic vaccination and inoculation against influenza Z23    4. Essential hypertension I10 atenolol (TENORMIN) 25 MG tablet   5. Anxiety F41.9 LORazepam (ATIVAN) 1 MG tablet   6. Intermittent asthma, uncomplicated J45.20 albuterol (PROAIR HFA/PROVENTIL HFA/VENTOLIN HFA) 108 (90 Base) MCG/ACT inhaler   7. KRISTINA (generalized anxiety disorder) F41.1 FLUoxetine (PROZAC) 20 MG capsule   8. Hyperlipidemia LDL goal <130 E78.5 simvastatin (ZOCOR) 20 MG tablet     Lipid panel reflex to direct LDL Fasting     ALT     AST   9. Non compliance w medication regimen Z91.14      Diabetes- uncontrolled, pt has not taken medications for 2-3 weeks. Glucophage- MG refills given.   Comprehensive labs ordered, will notify with results.     Diabetic foot exam completed- normal.     Influenza immunization administered at this visit.     Hypertension- well controlled, will continue with the same plan. Tenormin 25 MG refills given. Discontinued Norvasc 5 MG, pt is stable w/o it.     Hyperlipidemia- uncontrolled. Patient will start on Zocor 20 MG, will recheck lipid levels in 3 months.     Anxiety- improved Prozac 20 MG and Ativan 1 MG refilled, patient  reports doing well on these doses.     Intermittent asthma- stable, Proair HFA refills given. Patient reports improved breathing.     Strongly recommended to make lifestyle adjustments, advised to increase exercise and change eating habits, opt for healthy foods to help lose weight and improve mood.     Discussed compliance with medications and close monitoring. 3 month supply given as need for close follow-up.     Follow up with Provider in 05/2019 for bp and medication check.    The information in this document, created by the medical scribe for me, accurately reflects the services I personally performed and the decisions made by me. I have reviewed and approved this document for accuracy prior to leaving the patient care area.  Belkis Chadwick CNP  8:58 AM, February 21, 2019     LAWSON Hernandez CNP  [facility]

## 2019-02-21 ENCOUNTER — OFFICE VISIT (OUTPATIENT)
Dept: FAMILY MEDICINE | Facility: CLINIC | Age: 39
End: 2019-02-21
Payer: COMMERCIAL

## 2019-02-21 ENCOUNTER — TRANSFERRED RECORDS (OUTPATIENT)
Dept: HEALTH INFORMATION MANAGEMENT | Facility: CLINIC | Age: 39
End: 2019-02-21

## 2019-02-21 VITALS
TEMPERATURE: 97.9 F | HEART RATE: 66 BPM | WEIGHT: 315 LBS | BODY MASS INDEX: 39.17 KG/M2 | SYSTOLIC BLOOD PRESSURE: 122 MMHG | RESPIRATION RATE: 18 BRPM | OXYGEN SATURATION: 96 % | HEIGHT: 75 IN | DIASTOLIC BLOOD PRESSURE: 78 MMHG

## 2019-02-21 DIAGNOSIS — I10 ESSENTIAL HYPERTENSION: Chronic | ICD-10-CM

## 2019-02-21 DIAGNOSIS — F41.9 ANXIETY: ICD-10-CM

## 2019-02-21 DIAGNOSIS — Z13.89 SCREENING FOR DIABETIC PERIPHERAL NEUROPATHY: ICD-10-CM

## 2019-02-21 DIAGNOSIS — Z23 NEED FOR PROPHYLACTIC VACCINATION AND INOCULATION AGAINST INFLUENZA: ICD-10-CM

## 2019-02-21 DIAGNOSIS — J45.20 INTERMITTENT ASTHMA, UNCOMPLICATED: ICD-10-CM

## 2019-02-21 DIAGNOSIS — E78.5 HYPERLIPIDEMIA LDL GOAL <130: ICD-10-CM

## 2019-02-21 DIAGNOSIS — F41.1 GAD (GENERALIZED ANXIETY DISORDER): Chronic | ICD-10-CM

## 2019-02-21 DIAGNOSIS — E11.59 TYPE 2 DIABETES MELLITUS WITH OTHER CIRCULATORY COMPLICATION, WITHOUT LONG-TERM CURRENT USE OF INSULIN (H): Primary | ICD-10-CM

## 2019-02-21 DIAGNOSIS — Z91.148 NON COMPLIANCE W MEDICATION REGIMEN: ICD-10-CM

## 2019-02-21 LAB
ALBUMIN SERPL-MCNC: 3.4 G/DL (ref 3.4–5)
ALP SERPL-CCNC: 115 U/L (ref 40–150)
ALT SERPL W P-5'-P-CCNC: 24 U/L (ref 0–70)
ANION GAP SERPL CALCULATED.3IONS-SCNC: 5 MMOL/L (ref 3–14)
AST SERPL W P-5'-P-CCNC: 13 U/L (ref 0–45)
BILIRUB SERPL-MCNC: 0.4 MG/DL (ref 0.2–1.3)
BUN SERPL-MCNC: 13 MG/DL (ref 7–30)
CALCIUM SERPL-MCNC: 8.2 MG/DL (ref 8.5–10.1)
CHLORIDE SERPL-SCNC: 104 MMOL/L (ref 94–109)
CHOLEST SERPL-MCNC: 128 MG/DL
CO2 SERPL-SCNC: 30 MMOL/L (ref 20–32)
CREAT SERPL-MCNC: 0.76 MG/DL (ref 0.66–1.25)
CREAT UR-MCNC: 259 MG/DL
GFR SERPL CREATININE-BSD FRML MDRD: >90 ML/MIN/{1.73_M2}
GLUCOSE SERPL-MCNC: 154 MG/DL (ref 70–99)
HBA1C MFR BLD: 7.6 % (ref 0–5.6)
HDLC SERPL-MCNC: 33 MG/DL
LDLC SERPL CALC-MCNC: 60 MG/DL
MICROALBUMIN UR-MCNC: 26 MG/L
MICROALBUMIN/CREAT UR: 10 MG/G CR (ref 0–17)
NONHDLC SERPL-MCNC: 95 MG/DL
POTASSIUM SERPL-SCNC: 4.1 MMOL/L (ref 3.4–5.3)
PROT SERPL-MCNC: 7.4 G/DL (ref 6.8–8.8)
SODIUM SERPL-SCNC: 139 MMOL/L (ref 133–144)
TRIGL SERPL-MCNC: 177 MG/DL

## 2019-02-21 PROCEDURE — 83036 HEMOGLOBIN GLYCOSYLATED A1C: CPT | Performed by: NURSE PRACTITIONER

## 2019-02-21 PROCEDURE — 82043 UR ALBUMIN QUANTITATIVE: CPT | Performed by: NURSE PRACTITIONER

## 2019-02-21 PROCEDURE — 80053 COMPREHEN METABOLIC PANEL: CPT | Performed by: NURSE PRACTITIONER

## 2019-02-21 PROCEDURE — 36415 COLL VENOUS BLD VENIPUNCTURE: CPT | Performed by: NURSE PRACTITIONER

## 2019-02-21 PROCEDURE — 80061 LIPID PANEL: CPT | Performed by: NURSE PRACTITIONER

## 2019-02-21 PROCEDURE — 99207 C FOOT EXAM  NO CHARGE: CPT | Performed by: NURSE PRACTITIONER

## 2019-02-21 PROCEDURE — 99214 OFFICE O/P EST MOD 30 MIN: CPT | Performed by: NURSE PRACTITIONER

## 2019-02-21 RX ORDER — METFORMIN HCL 500 MG
TABLET, EXTENDED RELEASE 24 HR ORAL
Qty: 90 TABLET | Refills: 0 | Status: SHIPPED | OUTPATIENT
Start: 2019-02-21 | End: 2019-05-03

## 2019-02-21 RX ORDER — ATENOLOL 25 MG/1
25 TABLET ORAL DAILY
Qty: 90 TABLET | Refills: 0 | Status: SHIPPED | OUTPATIENT
Start: 2019-02-21 | End: 2019-05-18

## 2019-02-21 RX ORDER — ALBUTEROL SULFATE 90 UG/1
2 AEROSOL, METERED RESPIRATORY (INHALATION) EVERY 4 HOURS PRN
Qty: 1 INHALER | Refills: 5 | Status: SHIPPED | OUTPATIENT
Start: 2019-02-21 | End: 2019-10-24

## 2019-02-21 RX ORDER — LORAZEPAM 1 MG/1
1 TABLET ORAL DAILY PRN
Qty: 20 TABLET | Refills: 0 | Status: SHIPPED | OUTPATIENT
Start: 2019-02-21 | End: 2020-04-03

## 2019-02-21 RX ORDER — LORAZEPAM 1 MG/1
1 TABLET ORAL DAILY PRN
Qty: 20 TABLET | Refills: 0 | Status: CANCELLED | OUTPATIENT
Start: 2019-02-21

## 2019-02-21 RX ORDER — SIMVASTATIN 20 MG
20 TABLET ORAL AT BEDTIME
Qty: 30 TABLET | Refills: 1 | Status: SHIPPED | OUTPATIENT
Start: 2019-02-21 | End: 2019-05-03

## 2019-02-21 ASSESSMENT — PAIN SCALES - GENERAL: PAINLEVEL: NO PAIN (0)

## 2019-02-21 ASSESSMENT — MIFFLIN-ST. JEOR: SCORE: 2700.32

## 2019-02-22 ASSESSMENT — ASTHMA QUESTIONNAIRES: ACT_TOTALSCORE: 23

## 2019-03-14 PROBLEM — Z91.148 NON COMPLIANCE W MEDICATION REGIMEN: Status: ACTIVE | Noted: 2019-03-14

## 2019-03-22 ENCOUNTER — TELEPHONE (OUTPATIENT)
Dept: FAMILY MEDICINE | Facility: CLINIC | Age: 39
End: 2019-03-22

## 2019-03-22 DIAGNOSIS — E11.59 TYPE 2 DIABETES MELLITUS WITH OTHER CIRCULATORY COMPLICATION, WITHOUT LONG-TERM CURRENT USE OF INSULIN (H): ICD-10-CM

## 2019-03-22 DIAGNOSIS — E78.5 HYPERLIPIDEMIA LDL GOAL <130: ICD-10-CM

## 2019-03-22 LAB
ALT SERPL W P-5'-P-CCNC: 26 U/L (ref 0–70)
AST SERPL W P-5'-P-CCNC: 13 U/L (ref 0–45)
CHOLEST SERPL-MCNC: 139 MG/DL
HDLC SERPL-MCNC: 33 MG/DL
LDLC SERPL CALC-MCNC: 68 MG/DL
NONHDLC SERPL-MCNC: 106 MG/DL
TRIGL SERPL-MCNC: 192 MG/DL

## 2019-03-22 PROCEDURE — 80061 LIPID PANEL: CPT | Performed by: NURSE PRACTITIONER

## 2019-03-22 PROCEDURE — 84460 ALANINE AMINO (ALT) (SGPT): CPT | Performed by: NURSE PRACTITIONER

## 2019-03-22 PROCEDURE — 36415 COLL VENOUS BLD VENIPUNCTURE: CPT | Performed by: NURSE PRACTITIONER

## 2019-03-22 PROCEDURE — 84450 TRANSFERASE (AST) (SGOT): CPT | Performed by: NURSE PRACTITIONER

## 2019-03-22 NOTE — TELEPHONE ENCOUNTER
Left message asking patient to return call.  Please inform patient of RESULTS from Provider below.     Triglycerides are still a little elevated, work on healthy nutrition. Keep on same medications. Belkis Chadwick

## 2019-04-29 NOTE — PROGRESS NOTES
SUBJECTIVE:   Josué Franco is a 38 year old male who presents to clinic today for the following health issues:    History of Present Illness     Asthma:     Cough::  No    Wheezing::  No    Dyspnea::  No    Use of rescue inhaler::  None    Taking Asthma medication as prescribed::  Yes    Asthma triggers::  Same as previous visit    ER/UC Visits or Admissions::  None    Depression & Anxiety Follow-up:     Depression/Anxiety:  Anxiety only    Status since last visit::  Stable    Other associated symptoms of anxiety::  None    Significant life event::  No    Current substance use::  None    Depression symptoms::  None       Today's PHQ-9         PHQ-9 Total Score:     3   PHQ-9 Q9 Thoughts of better off dead/self-harm past 2 weeks :   Not at all   Thoughts of suicide or self harm:      Self-harm Plan:        Self-harm Action:          Safety concerns for self or others:       KRISTINA-7 Total Score: 3    Diabetes:     Frequency of checking blood sugars::  Not at all    Diabetic concerns::  None    Hypoglycemia symptoms::  None    Paraesthesia present::  No    Eye Exam in the last year::  NO    Diabetes Management Resources    Hypertension:     Outpatient blood pressures:  Are not being checked    Dietary sodium intake::  Not monitoring salt intake    Diet:  Regular (no restrictions)  Frequency of exercise:  1 day/week  Duration of exercise:  15-30 minutes  Taking medications regularly:  Yes  Medication side effects:  None  Additional concerns today:  No    Answers for HPI/ROS submitted by the patient on 5/3/2019   Chronic problems general questions HPI Form  If you checked off any problems, how difficult have these problems made it for you to do your work, take care of things at home, or get along with other people?: Not difficult at all  PHQ9 TOTAL SCORE: 3  KRISTINA 7 TOTAL SCORE: 3    Additional history: as documented    Mood and weight loss efforts  Patient notes that he is trying to get out and exercise with the weather  "change. He notes that he has not been eating as well lately and so that has been unhelpful with weight loss. However he notes that he can feel that he has gained muscle mass and strength. He notes that it has been difficult with more family issues: his father-in-law is declining in health and has lost 100 pounds since January 2019 without doing anything.     Diabetes  He reports that he feels much more energized and mentally clear in the morning since starting his metformin.     Hypertension   He had been taking his Norvasc a few weeks, but lost it on his trip to [place] so it has been about 1.5 months off his medication.     Asthma  He reports that he feels hs asthma is completely controlled. He denies nighttime symptoms or needing to use his inhaler or nebulizer for \"a while now.\" Denies chest pain or shortness of breath.     Reviewed and updated as needed this visit by clinical staff  Tobacco  Allergies  Meds  Problems  Med Hx  Surg Hx  Fam Hx  Soc Hx        Reviewed and updated as needed this visit by Provider  Tobacco  Allergies  Meds  Problems  Med Hx  Surg Hx  Fam Hx       Patient Active Problem List   Diagnosis     INTERMITTENT ASTHMA      CARDIOVASCULAR SCREENING; LDL GOAL LESS THAN 160     Oligospermia     Testicular hypofunction     KRISTINA (generalized anxiety disorder)     BMI over 45     Essential hypertension     ADRIANNA (obstructive sleep apnea)- severe (AHI 82)     Type 2 diabetes mellitus with other circulatory complication, without long-term current use of insulin (H)     Non compliance w medication regimen     Past Surgical History:   Procedure Laterality Date     CHOLECYSTECTOMY  1997     LITHOTRIPSY  7/08    Lithotrypsy     NISSEN FUNDOPLICATION  2001    Nissen Fundiplication       Social History     Tobacco Use     Smoking status: Never Smoker     Smokeless tobacco: Never Used   Substance Use Topics     Alcohol use: No     Comment: Very moderate 0-1/week     Family History   Problem " Relation Age of Onset     Coronary Artery Disease Father      Diabetes No family hx of      Hypertension No family hx of      Colon Cancer No family hx of          Current Outpatient Medications   Medication Sig Dispense Refill     albuterol (PROAIR HFA/PROVENTIL HFA/VENTOLIN HFA) 108 (90 Base) MCG/ACT inhaler Inhale 2 puffs into the lungs every 4 hours as needed for shortness of breath / dyspnea 1 Inhaler 5     atenolol (TENORMIN) 25 MG tablet Take 1 tablet (25 mg) by mouth daily 90 tablet 0     FLUoxetine (PROZAC) 20 MG capsule Take 3 capsules (60 mg) by mouth daily 270 capsule 0     LORazepam (ATIVAN) 1 MG tablet Take 1 tablet (1 mg) by mouth daily as needed for anxiety 20 tablet 0     metFORMIN (GLUCOPHAGE-XR) 500 MG 24 hr tablet TAKE 1 TABLET BY MOUTH ONCE DAILY (WITH DINNER) DUE FOR OFFICE VISIT FOR REFILLS. 90 tablet 1     order for DME CPAP 11 cm 1 Units 1     simvastatin (ZOCOR) 20 MG tablet Take 1 tablet (20 mg) by mouth At Bedtime 90 tablet 3     albuterol (2.5 MG/3ML) 0.083% neb solution INHALE CONTENTS OF 1 VIAL VIA NEBULIZER EVERY 4 HOURS AS NEEDED FOR WHEEZING.  2     budesonide-formoterol (SYMBICORT) 80-4.5 MCG/ACT Inhaler Inhale 2 puffs into the lungs 2 times daily (Patient not taking: Reported on 2/21/2019) 1 Inhaler 3     loratadine (CLARITIN) 10 MG tablet Take 10 mg by mouth       Allergies   Allergen Reactions     No Known Allergies      Recent Labs   Lab Test 05/03/19  0829 03/22/19  0906 02/21/19  0900 04/13/18  1637 01/04/18  0934  04/21/16  1149   A1C 7.1*  --  7.6* 6.7*  --   --   --    LDL  --  68 60  --  78   < > 69   HDL  --  33* 33*  --  39*   < > 36*   TRIG  --  192* 177*  --  147   < > 146   ALT  --  26 24  --   --   --   --    CR  --   --  0.76  --  0.76   < >  --    GFRESTIMATED  --   --  >90  --  >90   < >  --    GFRESTBLACK  --   --  >90  --  >90   < >  --    POTASSIUM  --   --  4.1  --  4.0   < >  --    TSH  --   --   --  3.48  --   --  2.27    < > = values in this interval not  "displayed.      BP Readings from Last 3 Encounters:   05/03/19 130/82   02/21/19 122/78   01/04/18 134/84    Wt Readings from Last 3 Encounters:   05/03/19 (!) 166 kg (366 lb)   02/21/19 (!) 169.3 kg (373 lb 3.2 oz)   01/04/18 (!) 168.7 kg (372 lb)        ROS:  Constitutional, HEENT, cardiovascular, pulmonary, GI, , musculoskeletal, neuro, skin, endocrine and psych systems are negative, except as otherwise noted.    This document serves as a record of the services and decisions personally performed and made by Belkis Chadwick CNP. It was created on his/her behalf by Brandie Humphreys, trained medical scribe. The creation of this document is based the provider's statements to the medical scribes.    James Humphreys 8:21 AM, May 3, 2019  OBJECTIVE:     /82   Pulse 61   Temp 97.8  F (36.6  C) (Temporal)   Resp 20   Ht 1.9 m (6' 2.8\")   Wt (!) 166 kg (366 lb)   SpO2 97%   BMI 45.99 kg/m    Body mass index is 45.99 kg/m .     GENERAL: healthy, alert and no distress  HENT: ear canals and TM's normal, nose and mouth without ulcers or lesions, small oral pharynx noted  NECK: no adenopathy, no asymmetry, masses, or scars and thyroid normal to palpation  RESP: lungs clear to auscultation - no rales, rhonchi or wheezes  CV: regular rate and rhythm, normal S1 S2, no S3 or S4, no murmur, click or rub, no peripheral edema and peripheral pulses strong  NEURO: Normal strength and tone, mentation intact and speech normal  PSYCH: mentation appears normal, affect normal/bright    Diagnostic Test Results:  Results for orders placed or performed in visit on 05/03/19 (from the past 24 hour(s))   Hemoglobin A1c   Result Value Ref Range    Hemoglobin A1C 7.1 (H) 0 - 5.6 %       ASSESSMENT/PLAN:       ICD-10-CM    1. Type 2 diabetes mellitus with other circulatory complication, without long-term current use of insulin (H) E11.59 metFORMIN (GLUCOPHAGE-XR) 500 MG 24 hr tablet     simvastatin (ZOCOR) 20 MG tablet     Hemoglobin " A1c   2. KRISTINA (generalized anxiety disorder) F41.1 FLUoxetine (PROZAC) 20 MG capsule   3. Anxiety F41.9    4. Hyperlipidemia LDL goal <130 E78.5 simvastatin (ZOCOR) 20 MG tablet     ALT     AST     Lipid panel reflex to direct LDL Non-fasting     Discussed mood, hypertension, asthma, diabetes, healthy diet, and regular exercise at length with patient today. Updated routine fasting screening labs; will notify with results.     Blood pressure is well controlled and within target range even with the patient not taking his Norvasc for the past 1.5 months, lost it and did not re-start. OK to remain off.  Plan to recheck in 2 weeks and if still controlled will continue off it. If patient's blood pressure is uncontrolled will restart Norvasc at previous dose.     Mood is good and stable. All other medications are well tolerated with not reported side effects. Plan to continue on current doses; Rx provided. Advised that he can stop his daily ASA.     Follow up in 2 weeks for a blood pressure recheck. Follow up with PCP in 6 months for medication management visit or prn.     The information in this document, created by the medical scribe for me, accurately reflects the services I personally performed and the decisions made by me. I have reviewed and approved this document for accuracy prior to leaving the patient care area.    HPI/ROS submitted by the patient on 5/3/2019   Chronic problems general questions HPI Form  If you checked off any problems, how difficult have these problems made it for you to do your work, take care of things at home, or get along with other people?: Not difficult at all  PHQ9 TOTAL SCORE: 3  KRISTINA 7 TOTAL SCORE: 3  Belkis Chadwick CNP  8:21 AM, 05/03/19    LAWSON Hernandez [facility]

## 2019-05-03 ENCOUNTER — OFFICE VISIT (OUTPATIENT)
Dept: FAMILY MEDICINE | Facility: CLINIC | Age: 39
End: 2019-05-03
Payer: COMMERCIAL

## 2019-05-03 VITALS
TEMPERATURE: 97.8 F | HEART RATE: 61 BPM | OXYGEN SATURATION: 97 % | SYSTOLIC BLOOD PRESSURE: 130 MMHG | DIASTOLIC BLOOD PRESSURE: 82 MMHG | BODY MASS INDEX: 39.17 KG/M2 | HEIGHT: 75 IN | WEIGHT: 315 LBS | RESPIRATION RATE: 20 BRPM

## 2019-05-03 DIAGNOSIS — E11.59 TYPE 2 DIABETES MELLITUS WITH OTHER CIRCULATORY COMPLICATION, WITHOUT LONG-TERM CURRENT USE OF INSULIN (H): Primary | ICD-10-CM

## 2019-05-03 DIAGNOSIS — F41.1 GAD (GENERALIZED ANXIETY DISORDER): Chronic | ICD-10-CM

## 2019-05-03 DIAGNOSIS — F41.9 ANXIETY: ICD-10-CM

## 2019-05-03 DIAGNOSIS — E78.5 HYPERLIPIDEMIA LDL GOAL <130: ICD-10-CM

## 2019-05-03 LAB
ALT SERPL W P-5'-P-CCNC: 30 U/L (ref 0–70)
AST SERPL W P-5'-P-CCNC: 14 U/L (ref 0–45)
CHOLEST SERPL-MCNC: 127 MG/DL
HBA1C MFR BLD: 7.1 % (ref 0–5.6)
HDLC SERPL-MCNC: 35 MG/DL
LDLC SERPL CALC-MCNC: 64 MG/DL
NONHDLC SERPL-MCNC: 92 MG/DL
TRIGL SERPL-MCNC: 141 MG/DL

## 2019-05-03 PROCEDURE — 83036 HEMOGLOBIN GLYCOSYLATED A1C: CPT | Performed by: NURSE PRACTITIONER

## 2019-05-03 PROCEDURE — 80061 LIPID PANEL: CPT | Performed by: NURSE PRACTITIONER

## 2019-05-03 PROCEDURE — 84460 ALANINE AMINO (ALT) (SGPT): CPT | Performed by: NURSE PRACTITIONER

## 2019-05-03 PROCEDURE — 84450 TRANSFERASE (AST) (SGOT): CPT | Performed by: NURSE PRACTITIONER

## 2019-05-03 PROCEDURE — 36415 COLL VENOUS BLD VENIPUNCTURE: CPT | Performed by: NURSE PRACTITIONER

## 2019-05-03 PROCEDURE — 99214 OFFICE O/P EST MOD 30 MIN: CPT | Performed by: NURSE PRACTITIONER

## 2019-05-03 RX ORDER — SIMVASTATIN 20 MG
20 TABLET ORAL AT BEDTIME
Qty: 90 TABLET | Refills: 3 | Status: SHIPPED | OUTPATIENT
Start: 2019-05-03 | End: 2019-10-24

## 2019-05-03 RX ORDER — LORAZEPAM 1 MG/1
1 TABLET ORAL DAILY PRN
Qty: 20 TABLET | Refills: 0 | Status: CANCELLED | OUTPATIENT
Start: 2019-05-03

## 2019-05-03 RX ORDER — METFORMIN HCL 500 MG
TABLET, EXTENDED RELEASE 24 HR ORAL
Qty: 90 TABLET | Refills: 1 | Status: SHIPPED | OUTPATIENT
Start: 2019-05-03 | End: 2019-10-24

## 2019-05-03 ASSESSMENT — PAIN SCALES - GENERAL: PAINLEVEL: NO PAIN (0)

## 2019-05-03 ASSESSMENT — ANXIETY QUESTIONNAIRES
GAD7 TOTAL SCORE: 3
4. TROUBLE RELAXING: NOT AT ALL
GAD7 TOTAL SCORE: 3
5. BEING SO RESTLESS THAT IT IS HARD TO SIT STILL: NOT AT ALL
6. BECOMING EASILY ANNOYED OR IRRITABLE: NOT AT ALL
GAD7 TOTAL SCORE: 3
1. FEELING NERVOUS, ANXIOUS, OR ON EDGE: SEVERAL DAYS
3. WORRYING TOO MUCH ABOUT DIFFERENT THINGS: SEVERAL DAYS
7. FEELING AFRAID AS IF SOMETHING AWFUL MIGHT HAPPEN: NOT AT ALL
7. FEELING AFRAID AS IF SOMETHING AWFUL MIGHT HAPPEN: NOT AT ALL
2. NOT BEING ABLE TO STOP OR CONTROL WORRYING: SEVERAL DAYS

## 2019-05-03 ASSESSMENT — PATIENT HEALTH QUESTIONNAIRE - PHQ9
SUM OF ALL RESPONSES TO PHQ QUESTIONS 1-9: 3
10. IF YOU CHECKED OFF ANY PROBLEMS, HOW DIFFICULT HAVE THESE PROBLEMS MADE IT FOR YOU TO DO YOUR WORK, TAKE CARE OF THINGS AT HOME, OR GET ALONG WITH OTHER PEOPLE: NOT DIFFICULT AT ALL
SUM OF ALL RESPONSES TO PHQ QUESTIONS 1-9: 3

## 2019-05-03 ASSESSMENT — MIFFLIN-ST. JEOR: SCORE: 2667.62

## 2019-05-04 ASSESSMENT — ASTHMA QUESTIONNAIRES: ACT_TOTALSCORE: 25

## 2019-05-04 ASSESSMENT — ANXIETY QUESTIONNAIRES: GAD7 TOTAL SCORE: 3

## 2019-05-06 PROBLEM — E78.5 HYPERLIPIDEMIA LDL GOAL <100: Status: ACTIVE | Noted: 2019-05-06

## 2019-05-18 DIAGNOSIS — I10 ESSENTIAL HYPERTENSION: Chronic | ICD-10-CM

## 2019-05-20 RX ORDER — ATENOLOL 25 MG/1
TABLET ORAL
Qty: 30 TABLET | Refills: 0 | Status: SHIPPED | OUTPATIENT
Start: 2019-05-20 | End: 2019-07-08

## 2019-05-20 NOTE — TELEPHONE ENCOUNTER
Atenolol  Medication is being filled for 1 time refill only due to:  Patient needs to be seen because BP check.    Mely Ceballos, RN, BSN

## 2019-06-27 ENCOUNTER — TELEPHONE (OUTPATIENT)
Dept: FAMILY MEDICINE | Facility: CLINIC | Age: 39
End: 2019-06-27

## 2019-06-27 NOTE — TELEPHONE ENCOUNTER
Summary:    Patient is due/failing the following:   BP CHECK and eye exam     Action needed:   Patient needs nurse only appointment. and schedule an eye exam    Type of outreach:    Phone, spoke to patient.  patient scheduled BP check and has not had an eye exam in 2yrs    Questions for provider review:    None                                                                                                                                    Oly Rowan       Chart routed to Care Team .      Panel Management Review      Patient has the following on his problem list:     Asthma review     ACT Total Scores 5/3/2019   ACT TOTAL SCORE -   ASTHMA ER VISITS -   ASTHMA HOSPITALIZATIONS -   ACT TOTAL SCORE (Goal Greater than or Equal to 20) 25   In the past 12 months, how many times did you visit the emergency room for your asthma without being admitted to the hospital? 0   In the past 12 months, how many times were you hospitalized overnight because of your asthma? 0      1. Is Asthma diagnosis on the Problem List? Yes    2. Is Asthma listed on Health Maintenance? Yes    3. Patient is due for:  ACT    Diabetes    ASA: Passed    Last A1C  Lab Results   Component Value Date    A1C 7.1 05/03/2019    A1C 7.6 02/21/2019    A1C 6.7 04/13/2018     A1C tested: Passed    Last LDL:    Lab Results   Component Value Date    CHOL 127 05/03/2019     Lab Results   Component Value Date    HDL 35 05/03/2019     Lab Results   Component Value Date    LDL 64 05/03/2019     Lab Results   Component Value Date    TRIG 141 05/03/2019     No results found for: CHOLHDLRATIO  Lab Results   Component Value Date    NHDL 92 05/03/2019       Is the patient on a Statin? YES             Is the patient on Aspirin? NO    Medications     HMG CoA Reductase Inhibitors     simvastatin (ZOCOR) 20 MG tablet             Last three blood pressure readings:  BP Readings from Last 3 Encounters:   05/03/19 130/82   02/21/19 122/78   01/04/18 134/84          Tobacco  History:     History   Smoking Status     Never Smoker   Smokeless Tobacco     Never Used         Hypertension   Last three blood pressure readings:  BP Readings from Last 3 Encounters:   05/03/19 130/82   02/21/19 122/78   01/04/18 134/84     Blood pressure: Passed    HTN Guidelines:  Less than 140/90      Composite cancer screening  Chart review shows that this patient is due/due soon for the following None

## 2019-07-01 ENCOUNTER — ALLIED HEALTH/NURSE VISIT (OUTPATIENT)
Dept: FAMILY MEDICINE | Facility: CLINIC | Age: 39
End: 2019-07-01
Payer: COMMERCIAL

## 2019-07-01 VITALS — DIASTOLIC BLOOD PRESSURE: 80 MMHG | SYSTOLIC BLOOD PRESSURE: 130 MMHG | HEART RATE: 64 BPM

## 2019-07-01 DIAGNOSIS — I10 ESSENTIAL HYPERTENSION: Primary | ICD-10-CM

## 2019-07-01 PROCEDURE — 99207 ZZC NO CHARGE NURSE ONLY: CPT

## 2019-07-01 NOTE — PROGRESS NOTES
Josué Franco is a 39 year old patient who comes in today for a Blood Pressure check.  Initial BP:  /80   Pulse 64      64  Disposition: follow-up as previously indicated by provider.     David Arreola MA

## 2019-07-08 DIAGNOSIS — I10 ESSENTIAL HYPERTENSION: Chronic | ICD-10-CM

## 2019-07-09 RX ORDER — ATENOLOL 25 MG/1
TABLET ORAL
Qty: 90 TABLET | Refills: 0 | Status: SHIPPED | OUTPATIENT
Start: 2019-07-09 | End: 2019-10-12

## 2019-07-09 NOTE — TELEPHONE ENCOUNTER
Atenolol  Prescription approved per [facility] Refill Protocol.    Mely Ceballos, RN, BSN

## 2019-08-22 DIAGNOSIS — F41.1 GAD (GENERALIZED ANXIETY DISORDER): Chronic | ICD-10-CM

## 2019-10-04 ENCOUNTER — TELEPHONE (OUTPATIENT)
Dept: FAMILY MEDICINE | Facility: CLINIC | Age: 39
End: 2019-10-04

## 2019-10-04 NOTE — TELEPHONE ENCOUNTER
Please abstract the following data from this visit with this patient into the appropriate field in Epic:    Tests that can be patient reported without a hard copy:    Eye exam with ophthalmology on this date: 02/2019 [facility]    Other Tests found in the patient's chart through Chart Review/Care Everywhere:        Note to Abstraction: If this section is blank, no results were found via Chart Review/Care Everywhere.        Panel Management Review      Patient has the following on his problem list:     Asthma review     ACT Total Scores 5/3/2019   ACT TOTAL SCORE -   ASTHMA ER VISITS -   ASTHMA HOSPITALIZATIONS -   ACT TOTAL SCORE (Goal Greater than or Equal to 20) 25   In the past 12 months, how many times did you visit the emergency room for your asthma without being admitted to the hospital? 0   In the past 12 months, how many times were you hospitalized overnight because of your asthma? 0      1. Is Asthma diagnosis on the Problem List? Yes    2. Is Asthma listed on Health Maintenance? Yes    3. Patient is due for:  ACT    Diabetes    ASA: Passed    Last A1C  Lab Results   Component Value Date    A1C 7.1 05/03/2019    A1C 7.6 02/21/2019    A1C 6.7 04/13/2018     A1C tested: Passed    Last LDL:    Lab Results   Component Value Date    CHOL 127 05/03/2019     Lab Results   Component Value Date    HDL 35 05/03/2019     Lab Results   Component Value Date    LDL 64 05/03/2019     Lab Results   Component Value Date    TRIG 141 05/03/2019     No results found for: CHOLHDLRATIO  Lab Results   Component Value Date    NHDL 92 05/03/2019       Is the patient on a Statin? YES             Is the patient on Aspirin? NO    Medications     HMG CoA Reductase Inhibitors     simvastatin (ZOCOR) 20 MG tablet             Last three blood pressure readings:  BP Readings from Last 3 Encounters:   07/01/19 130/80   05/03/19 130/82   02/21/19 122/78        Tobacco History:     History   Smoking Status     Never Smoker    Smokeless Tobacco     Never Used         Hypertension   Last three blood pressure readings:  BP Readings from Last 3 Encounters:   07/01/19 130/80   05/03/19 130/82   02/21/19 122/78     Blood pressure: Passed    HTN Guidelines:  Less than 140/90      Composite cancer screening  Chart review shows that this patient is due/due soon for the following None  Summary:    Patient is due/failing the following:   Eye exam    Action needed:   Schedule an eye exam    Type of outreach:    none per records patient had eye in 2019    Questions for provider review:    None                                                                                                                                    Oly Hope CMA       Chart routed to Care Team .

## 2019-10-12 DIAGNOSIS — I10 ESSENTIAL HYPERTENSION: Chronic | ICD-10-CM

## 2019-10-15 RX ORDER — ATENOLOL 25 MG/1
TABLET ORAL
Qty: 30 TABLET | Refills: 0 | Status: SHIPPED | OUTPATIENT
Start: 2019-10-15 | End: 2019-10-24 | Stop reason: ALTCHOICE

## 2019-10-15 NOTE — TELEPHONE ENCOUNTER
Patient informed and aware he is due for an office visit.     Patient states he will call back when ready to schedule.

## 2019-10-15 NOTE — TELEPHONE ENCOUNTER
Atenolol  Medication is being filled for 1 time refill only due to:  Patient needs to be seen because DM OV.    Mely Ceballos, RN, BSN

## 2019-10-18 DIAGNOSIS — I10 ESSENTIAL HYPERTENSION: Chronic | ICD-10-CM

## 2019-10-21 RX ORDER — ATENOLOL 25 MG/1
TABLET ORAL
Qty: 90 TABLET | Refills: 0
Start: 2019-10-21

## 2019-10-21 NOTE — TELEPHONE ENCOUNTER
Pending Prescriptions:                       Disp   Refills    atenolol (TENORMIN) 25 MG tablet [Pharmac*90 tab*0            Sig: TAKE 1 TABLET BY MOUTH EVERY DAY    Sent 10/15/19 with 30 day supply. Refill not appropriate at this time.     Next 5 appointments (look out 90 days)    Oct 24, 2019  8:20 AM CDT  Office Visit with LAWSON Hicks CNP  [facility] [address] [place] [address], [address]  [facility] [id number] [phone]          Nancy Nagy, RN, BSN

## 2019-10-21 NOTE — PROGRESS NOTES
Subjective     Josué Franco is a 39 year old male who presents to clinic today for the following health issues:     `    History of Present Illness      Asthma:  He presents for follow up of asthma.  He has some cough, some wheezing, and some shortness of breath. He is using a relief medication every 4 hours. He does not have a controller medication. Patient is aware of the following triggers: animal dander, cold air and humidity. The patient has not had a visit to the Emergency Room, Urgent Care or Hospital due to asthma since the last clinic visit.     Back Pain:  He presents for follow up of back pain. Patient's back pain is a chronic problem.  Location of back pain:  Left shoulder  Description of back pain: burning, sharp, shooting and stabbing  Back pain spreads: nowhere    Since patient first noticed back pain, pain is: always present, but gets better and worse  Does back pain interfere with his job:  Yes      Mental Health Follow-up:  Patient presents to follow-up on Depression & Anxiety.Patient's depression since last visit has been:  Good  The patient is not having other symptoms associated with depression.  Patient's anxiety since last visit has been:  No change  The patient is not having other symptoms associated with anxiety.  Any significant life events: No  Patient is not feeling anxious or having panic attacks.  Patient has no concerns about alcohol or drug use.     Social History  Tobacco Use    Smoking status: Never Smoker    Smokeless tobacco: Never Used  Alcohol use: No    Comment: Very moderate 0-1/week  Drug use: No      Today's PHQ-9         PHQ-9 Total Score:     (P) 6   PHQ-9 Q9 Thoughts of better off dead/self-harm past 2 weeks :   (P) Not at all   Thoughts of suicide or self harm:      Self-harm Plan:        Self-harm Action:          Safety concerns for self or others:           Hyperlipidemia:  He presents for follow up of hyperlipidemia.  He reports increased sweating or nausea with  "activity. He is not taking medication to lower cholesterol. He is not having myalgia or other side effects to statin medications.    He eats 0-1 servings of fruits and vegetables daily.He consumes 3 sweetened beverage(s) daily.  He is taking medications regularly.      Fasting today.     Patient states he wanted to talk about \"increasing sweats in the 6 months\". And requesting prednisone medication. Patient recently got over right ear infection and completed course of amoxicillin ( about 2-3 weeks ago).      Chronic left shoulder pain  The patient states that he did not have his MRI completed for his shoulder and back pain. He notes that he forgot to have the MRI completed and would like a new order placed.   He notes having good range of motion. He states that he is weak when placing hands over head.     Acute illness   The patient states that he had ear pain about a week ago. He notes having a cold that started a week ago. He states that the cold has since moved into his chest. He notes having to use his Nebulizer.     Cholesterol   The patient states that he is not using Simvastatin. He notes being told that he does not need to take it.     Mood  The patient states that his mood is doing okay.    Fatigue  He notes feeling more fatigued and tired. He also feels more sweaty recently. He declines feeling shortness of breath or chest pain when being sweaty. He also declines having difficulty doing work.     Blood pressure  The patient notes that he did not take his blood pressure medication today. He declines utilizing Metoprolol.           Patient Active Problem List   Diagnosis     INTERMITTENT ASTHMA      CARDIOVASCULAR SCREENING; LDL GOAL LESS THAN 160     Oligospermia     Testicular hypofunction     KRISTINA (generalized anxiety disorder)     BMI over 45     Essential hypertension     ADRIANNA (obstructive sleep apnea)- severe (AHI 82)     Type 2 diabetes mellitus with other circulatory complication, without long-term " current use of insulin (H)     Non compliance w medication regimen     Hyperlipidemia LDL goal <100     Past Surgical History:   Procedure Laterality Date     CHOLECYSTECTOMY  1997     LITHOTRIPSY  7/08    Lithotrypsy     NISSEN FUNDOPLICATION  2001    Nissen Fundiplication       Social History     Tobacco Use     Smoking status: Never Smoker     Smokeless tobacco: Never Used   Substance Use Topics     Alcohol use: No     Comment: Very moderate 0-1/week     Family History   Problem Relation Age of Onset     Coronary Artery Disease Father      Diabetes No family hx of      Hypertension No family hx of      Colon Cancer No family hx of          Current Outpatient Medications   Medication Sig Dispense Refill     albuterol (PROAIR HFA/PROVENTIL HFA/VENTOLIN HFA) 108 (90 Base) MCG/ACT inhaler Inhale 2 puffs into the lungs every 4 hours as needed for shortness of breath / dyspnea 1 Inhaler 5     albuterol (PROVENTIL) (2.5 MG/3ML) 0.083% neb solution INHALE CONTENTS OF 1 VIAL VIA NEBULIZER EVERY 4 HOURS AS NEEDED FOR WHEEZING. 1 Box 1     budesonide-formoterol (SYMBICORT) 80-4.5 MCG/ACT Inhaler Inhale 2 puffs into the lungs 2 times daily 1 Inhaler 3     FLUoxetine (PROZAC) 20 MG capsule Take 3 capsules (60 mg) by mouth daily 270 capsule 1     LORazepam (ATIVAN) 1 MG tablet Take 1 tablet (1 mg) by mouth daily as needed for anxiety 20 tablet 0     metFORMIN (GLUCOPHAGE-XR) 500 MG 24 hr tablet TAKE 1 TABLET BY MOUTH ONCE DAILY (WITH DINNER) DUE FOR OFFICE VISIT FOR REFILLS. 90 tablet 1     metoprolol succinate ER (TOPROL-XL) 50 MG 24 hr tablet Take 1 tablet (50 mg) by mouth daily 30 tablet 1     order for DME CPAP 11 cm 1 Units 1     predniSONE (DELTASONE) 20 MG tablet Take 2 tablets (40 mg) by mouth daily for 5 days 10 tablet 0     simvastatin (ZOCOR) 20 MG tablet Take 1 tablet (20 mg) by mouth At Bedtime 90 tablet 3     loratadine (CLARITIN) 10 MG tablet Take 10 mg by mouth       Allergies   Allergen Reactions     No Known  "Allergies      Recent Labs   Lab Test 05/03/19  0829 03/22/19  0906 02/21/19  0900 04/13/18  1637 01/04/18  0934  04/21/16  1149   A1C 7.1*  --  7.6* 6.7*  --   --   --    LDL 64 68 60  --  78   < > 69   HDL 35* 33* 33*  --  39*   < > 36*   TRIG 141 192* 177*  --  147   < > 146   ALT 30 26 24  --   --   --   --    CR  --   --  0.76  --  0.76   < >  --    GFRESTIMATED  --   --  >90  --  >90   < >  --    GFRESTBLACK  --   --  >90  --  >90   < >  --    POTASSIUM  --   --  4.1  --  4.0   < >  --    TSH  --   --   --  3.48  --   --  2.27    < > = values in this interval not displayed.      BP Readings from Last 3 Encounters:   10/24/19 (!) 142/104   07/01/19 130/80   05/03/19 130/82    Wt Readings from Last 3 Encounters:   10/24/19 (!) 166.5 kg (367 lb)   05/03/19 (!) 166 kg (366 lb)   02/21/19 (!) 169.3 kg (373 lb 3.2 oz)                    Reviewed and updated as needed this visit by Provider  Tobacco  Allergies  Meds  Problems  Med Hx  Surg Hx  Fam Hx         Review of Systems   ROS COMP: Constitutional, neuro, ENT, endocrine, pulmonary, cardiac, gastrointestinal, genitourinary, musculoskeletal, integument and psychiatric systems are negative, except as otherwise noted.    This document serves as a record of the services and decisions personally performed and made by LAWSON Hernandez CNP. It was created on her behalf by Seema Sevilla, a trained medical scribe. The creation of this document is based the provider's statements to the medical scribe.    Seema Sevilla October 24, 2019 8:55 AM        Objective    BP (!) 142/104   Pulse 74   Temp 97.6  F (36.4  C) (Oral)   Resp 16   Ht 1.88 m (6' 2\")   Wt (!) 166.5 kg (367 lb)   SpO2 97%   BMI 47.12 kg/m    Body mass index is 47.12 kg/m .  Physical Exam   GENERAL: healthy, alert and no distress  HENT: ear canals and TM's normal, nose and mouth without ulcers or lesions  NECK: Large neck girth, mild anterior adenopathy, no asymmetry, masses, or scars and " thyroid normal to palpation  RESP: lungs clear to auscultation - no rales, rhonchi or wheezes  CV: regular rate and rhythm, normal S1 S2, no S3 or S4, no murmur, click or rub, no peripheral edema and peripheral pulses strong  NEURO: Normal strength and tone, mentation intact and speech normal  PSYCH: mentation appears normal, affect normal/bright    Diagnostic Test Results:  none         Assessment & Plan       ICD-10-CM    1. Type 2 diabetes mellitus with other circulatory complication, without long-term current use of insulin (H) E11.59 metFORMIN (GLUCOPHAGE-XR) 500 MG 24 hr tablet     simvastatin (ZOCOR) 20 MG tablet     Hemoglobin A1c     Basic metabolic panel   2. KRISTINA (generalized anxiety disorder) F41.1 FLUoxetine (PROZAC) 20 MG capsule   3. Essential hypertension I10 metoprolol succinate ER (TOPROL-XL) 50 MG 24 hr tablet   4. Intermittent asthma, uncomplicated J45.20 albuterol (PROAIR HFA/PROVENTIL HFA/VENTOLIN HFA) 108 (90 Base) MCG/ACT inhaler   5. Hyperlipidemia LDL goal <130 E78.5 simvastatin (ZOCOR) 20 MG tablet   6. Excessive sweating R61 TSH with free T4 reflex   7. Chronic left shoulder pain M25.512 MR Shoulder Left w/o Contrast    G89.29      Patient reports taking Metformin as prescribed. Increased metformin due to A1c elevating.   Patient reports not taking Simvastatin as he was told to quit use- this direction was not noted or advised that I can see.  Advised patient to restart Simvastatin. Will continue to monitor. Tolerating medication. Labs have been ordered. Awaiting results. Dose adjustment as needed.     Doing well. Well controlled. No change in plan. Will continue to monitor.     Patient reports last blood pressure dose was yesterday as he ran out of medication. Patient's blood pressure was elevated in clinic today. Metoprolol Succinate ER has been prescribed. Patient will come back in 2 weeks for blood pressure recheck. Advised patient to not take Dayquil as this can cause his blood  "pressure to become elevated.     Patient is doing well. Refills have been ordered. Will continue to monitor.     Patient reports not taking Simvastatin as he was told to quit use. Reviewed previous patient visit note- patient was not told this. Advised patient to restart Simvastatin. Will continue to monitor. Tolerating medication.    Patient reports having excessive sweating since May 2019. Labs have been ordered. Awaiting results.     Patient reports not having MRI completed that was ordered in 2017 as he \"lost the paperwork\". He reports having weakness when arms are raised over head, chronic issue. MRI order has been placed. Patient will schedule appointment.    BMI:   Estimated body mass index is 47.12 kg/m  as calculated from the following:    Height as of this encounter: 1.88 m (6' 2\").    Weight as of this encounter: 166.5 kg (367 lb).   Weight management plan: Discussed healthy diet and exercise guidelines    Follow up in 2 weeks for blood pressure recheck. Follow up in a month for immunizations, asthma and physical exam.      The information in this document, created by the medical scribe for me, accurately reflects the services I personally performed and the decisions made by me. I have reviewed and approved this document for accuracy prior to leaving the patient care area.    LAWSON Hernandez [facility] RINA    "

## 2019-10-24 ENCOUNTER — OFFICE VISIT (OUTPATIENT)
Dept: FAMILY MEDICINE | Facility: CLINIC | Age: 39
End: 2019-10-24
Payer: COMMERCIAL

## 2019-10-24 VITALS
OXYGEN SATURATION: 97 % | BODY MASS INDEX: 40.43 KG/M2 | WEIGHT: 315 LBS | RESPIRATION RATE: 16 BRPM | SYSTOLIC BLOOD PRESSURE: 142 MMHG | HEIGHT: 74 IN | HEART RATE: 74 BPM | DIASTOLIC BLOOD PRESSURE: 104 MMHG | TEMPERATURE: 97.6 F

## 2019-10-24 DIAGNOSIS — E11.59 TYPE 2 DIABETES MELLITUS WITH OTHER CIRCULATORY COMPLICATION, WITHOUT LONG-TERM CURRENT USE OF INSULIN (H): Primary | ICD-10-CM

## 2019-10-24 DIAGNOSIS — G89.29 CHRONIC LEFT SHOULDER PAIN: ICD-10-CM

## 2019-10-24 DIAGNOSIS — R61 EXCESSIVE SWEATING: ICD-10-CM

## 2019-10-24 DIAGNOSIS — I10 ESSENTIAL HYPERTENSION: Chronic | ICD-10-CM

## 2019-10-24 DIAGNOSIS — J45.20 INTERMITTENT ASTHMA, UNCOMPLICATED: ICD-10-CM

## 2019-10-24 DIAGNOSIS — F41.1 GAD (GENERALIZED ANXIETY DISORDER): Chronic | ICD-10-CM

## 2019-10-24 DIAGNOSIS — M25.512 CHRONIC LEFT SHOULDER PAIN: ICD-10-CM

## 2019-10-24 DIAGNOSIS — E78.5 HYPERLIPIDEMIA LDL GOAL <130: ICD-10-CM

## 2019-10-24 LAB
ANION GAP SERPL CALCULATED.3IONS-SCNC: 8 MMOL/L (ref 3–14)
BUN SERPL-MCNC: 13 MG/DL (ref 7–30)
CALCIUM SERPL-MCNC: 8.7 MG/DL (ref 8.5–10.1)
CHLORIDE SERPL-SCNC: 103 MMOL/L (ref 94–109)
CO2 SERPL-SCNC: 27 MMOL/L (ref 20–32)
CREAT SERPL-MCNC: 0.7 MG/DL (ref 0.66–1.25)
GFR SERPL CREATININE-BSD FRML MDRD: >90 ML/MIN/{1.73_M2}
GLUCOSE SERPL-MCNC: 181 MG/DL (ref 70–99)
HBA1C MFR BLD: 7.7 % (ref 0–5.6)
POTASSIUM SERPL-SCNC: 4.2 MMOL/L (ref 3.4–5.3)
SODIUM SERPL-SCNC: 138 MMOL/L (ref 133–144)
TSH SERPL DL<=0.005 MIU/L-ACNC: 2.68 MU/L (ref 0.4–4)

## 2019-10-24 PROCEDURE — 80048 BASIC METABOLIC PNL TOTAL CA: CPT | Performed by: NURSE PRACTITIONER

## 2019-10-24 PROCEDURE — 84443 ASSAY THYROID STIM HORMONE: CPT | Performed by: NURSE PRACTITIONER

## 2019-10-24 PROCEDURE — 83036 HEMOGLOBIN GLYCOSYLATED A1C: CPT | Performed by: NURSE PRACTITIONER

## 2019-10-24 PROCEDURE — 99214 OFFICE O/P EST MOD 30 MIN: CPT | Performed by: NURSE PRACTITIONER

## 2019-10-24 PROCEDURE — 36415 COLL VENOUS BLD VENIPUNCTURE: CPT | Performed by: NURSE PRACTITIONER

## 2019-10-24 RX ORDER — SIMVASTATIN 20 MG
20 TABLET ORAL AT BEDTIME
Qty: 90 TABLET | Refills: 3 | Status: SHIPPED | OUTPATIENT
Start: 2019-10-24 | End: 2020-01-29

## 2019-10-24 RX ORDER — METFORMIN HCL 500 MG
1000 TABLET, EXTENDED RELEASE 24 HR ORAL
Qty: 180 TABLET | Refills: 0 | Status: SHIPPED | OUTPATIENT
Start: 2019-10-24 | End: 2020-01-24

## 2019-10-24 RX ORDER — METOPROLOL SUCCINATE 50 MG/1
50 TABLET, EXTENDED RELEASE ORAL DAILY
Qty: 30 TABLET | Refills: 1 | Status: SHIPPED | OUTPATIENT
Start: 2019-10-24 | End: 2019-12-19

## 2019-10-24 RX ORDER — ALBUTEROL SULFATE 90 UG/1
2 AEROSOL, METERED RESPIRATORY (INHALATION) EVERY 4 HOURS PRN
Qty: 1 INHALER | Refills: 5 | Status: SHIPPED | OUTPATIENT
Start: 2019-10-24

## 2019-10-24 RX ORDER — PREDNISONE 20 MG/1
40 TABLET ORAL DAILY
Qty: 10 TABLET | Refills: 0 | Status: SHIPPED | OUTPATIENT
Start: 2019-10-24 | End: 2020-01-24

## 2019-10-24 RX ORDER — BUDESONIDE AND FORMOTEROL FUMARATE DIHYDRATE 80; 4.5 UG/1; UG/1
2 AEROSOL RESPIRATORY (INHALATION) 2 TIMES DAILY
Qty: 1 INHALER | Refills: 3 | Status: SHIPPED | OUTPATIENT
Start: 2019-10-24 | End: 2021-11-04

## 2019-10-24 RX ORDER — ALBUTEROL SULFATE 0.83 MG/ML
SOLUTION RESPIRATORY (INHALATION)
Qty: 1 BOX | Refills: 1 | Status: SHIPPED | OUTPATIENT
Start: 2019-10-24 | End: 2020-12-18

## 2019-10-24 RX ORDER — ATENOLOL 25 MG/1
25 TABLET ORAL DAILY
Qty: 30 TABLET | Refills: 0 | Status: CANCELLED | OUTPATIENT
Start: 2019-10-24

## 2019-10-24 RX ORDER — METFORMIN HCL 500 MG
TABLET, EXTENDED RELEASE 24 HR ORAL
Qty: 90 TABLET | Refills: 1 | Status: SHIPPED | OUTPATIENT
Start: 2019-10-24 | End: 2019-10-24

## 2019-10-24 ASSESSMENT — ANXIETY QUESTIONNAIRES
6. BECOMING EASILY ANNOYED OR IRRITABLE: SEVERAL DAYS
2. NOT BEING ABLE TO STOP OR CONTROL WORRYING: NOT AT ALL
GAD7 TOTAL SCORE: 4
3. WORRYING TOO MUCH ABOUT DIFFERENT THINGS: SEVERAL DAYS
GAD7 TOTAL SCORE: 4
7. FEELING AFRAID AS IF SOMETHING AWFUL MIGHT HAPPEN: SEVERAL DAYS
GAD7 TOTAL SCORE: 4
1. FEELING NERVOUS, ANXIOUS, OR ON EDGE: SEVERAL DAYS
4. TROUBLE RELAXING: NOT AT ALL
5. BEING SO RESTLESS THAT IT IS HARD TO SIT STILL: NOT AT ALL
7. FEELING AFRAID AS IF SOMETHING AWFUL MIGHT HAPPEN: SEVERAL DAYS

## 2019-10-24 ASSESSMENT — PATIENT HEALTH QUESTIONNAIRE - PHQ9
10. IF YOU CHECKED OFF ANY PROBLEMS, HOW DIFFICULT HAVE THESE PROBLEMS MADE IT FOR YOU TO DO YOUR WORK, TAKE CARE OF THINGS AT HOME, OR GET ALONG WITH OTHER PEOPLE: SOMEWHAT DIFFICULT
SUM OF ALL RESPONSES TO PHQ QUESTIONS 1-9: 6
SUM OF ALL RESPONSES TO PHQ QUESTIONS 1-9: 6

## 2019-10-24 ASSESSMENT — PAIN SCALES - GENERAL: PAINLEVEL: SEVERE PAIN (6)

## 2019-10-24 ASSESSMENT — MIFFLIN-ST. JEOR: SCORE: 2654.45

## 2019-10-24 NOTE — LETTER
My Asthma Action Plan    Name: Josué Franco   YOB: 1980  Date: 11/8/2019   My doctor: LAWSON Hernandez CNP   My clinic: [facility]        My Rescue Medicine:   Albuterol inhaler (Proair/Ventolin/Proventil HFA)  2-4 puffs EVERY 4 HOURS as needed. Use a spacer if recommended by your provider.      Control medications: Symbicort with illnesses My Asthma Severity:   Intermittent / Exercise Induced  Know your asthma triggers: upper respiratory infections and see attached  None          GREEN ZONE   Good Control    I feel good    No cough or wheeze    Can work, sleep and play without asthma symptoms       Take your asthma control medicine every day.     1. If exercise triggers your asthma, take your rescue medication    15 minutes before exercise or sports, and    During exercise if you have asthma symptoms  2. Spacer to use with inhaler: If you have a spacer, make sure to use it with your inhaler             YELLOW ZONE Getting Worse  I have ANY of these:    I do not feel good    Cough or wheeze    Chest feels tight    Wake up at night   1. Keep taking your Green Zone medications  2. Start taking your rescue medicine:    every 20 minutes for up to 1 hour. Then every 4 hours for 24-48 hours.  3. If you stay in the Yellow Zone for more than 12-24 hours, contact your doctor.  4. If you do not return to the Green Zone in 12-24 hours or you get worse, start taking your oral steroid medicine if prescribed by your provider.           RED ZONE Medical Alert - Get Help  I have ANY of these:    I feel awful    Medicine is not helping    Breathing getting harder    Trouble walking or talking    Nose opens wide to breathe       1. Take your rescue medicine NOW  2. If your provider has prescribed an oral steroid medicine, start taking it NOW  3. Call your doctor NOW  4. If you are still in the Red Zone after 20 minutes and you have not reached your doctor:    Take your rescue medicine again  and    Call 911 or go to the emergency room right away    See your regular doctor within 2 weeks of an Emergency Room or Urgent Care visit for follow-up treatment.          Annual Reminders:  Meet with Asthma Educator,  Flu Shot in the Fall, consider Pneumonia Vaccination for patients with asthma (aged 19 and older).    Pharmacy:    [facility] - [place], [place] - [address]  [facility] [address] [place] [facility] [address]                        Asthma Triggers  How To Control Things That Make Your Asthma Worse    Triggers are things that make your asthma worse.  Look at the list below to help you find your triggers and   what you can do about them. You can help prevent asthma flare-ups by staying away from your triggers.      Trigger                                                          What you can do   Cigarette Smoke  Tobacco smoke can make asthma worse. Do not allow smoking in your home, car or around you.  Be sure no one smokes at a child s day care or school.  If you smoke, ask your health care provider for ways to help you quit.  Ask family members to quit too.  Ask your health care provider for a referral to Quit Plan to help you quit smoking, or call 4-239-333-PLAN.     Colds, Flu, Bronchitis  These are common triggers of asthma. Wash your hands often.  Don t touch your eyes, nose or mouth.  Get a flu shot every year.     Dust Mites  These are tiny bugs that live in cloth or carpet. They are too small to see. Wash sheets and blankets in hot water every week.   Encase pillows and mattress in dust mite proof covers.  Avoid having carpet if you can. If you have carpet, vacuum weekly.   Use a dust mask and HEPA vacuum.   Pollen and Outdoor Mold  Some people are allergic to trees, grass, or weed pollen, or molds. Try to keep your windows closed.  Limit time out doors when pollen count is high.   Ask you health care provider about taking medicine during allergy season.      Animal Dander  Some people are allergic to skin flakes, urine or saliva from pets with fur or feathers. Keep pets with fur or feathers out of your home.    If you can t keep the pet outdoors, then keep the pet out of your bedroom.  Keep the bedroom door closed.  Keep pets off cloth furniture and away from stuffed toys.     Mice, Rats, and Cockroaches  Some people are allergic to the waste from these pests.   Cover food and garbage.  Clean up spills and food crumbs.  Store grease in the refrigerator.   Keep food out of the bedroom.   Indoor Mold  This can be a trigger if your home has high moisture. Fix leaking faucets, pipes, or other sources of water.   Clean moldy surfaces.  Dehumidify basement if it is damp and smelly.   Smoke, Strong Odors, and Sprays  These can reduce air quality. Stay away from strong odors and sprays, such as perfume, powder, hair spray, paints, smoke incense, paint, cleaning products, candles and new carpet.   Exercise or Sports  Some people with asthma have this trigger. Be active!  Ask your doctor about taking medicine before sports or exercise to prevent symptoms.    Warm up for 5-10 minutes before and after sports or exercise.     Other Triggers of Asthma  Cold air:  Cover your nose and mouth with a scarf.  Sometimes laughing or crying can be a trigger.  Some medicines and food can trigger asthma.

## 2019-10-24 NOTE — PATIENT INSTRUCTIONS
Restart Simvastatin.     Start the new blood pressure medication I have prescribed for you. Come back to recheck your blood pressure in 1 week.     Come back in 1 month for asthma, physical and immunizations.

## 2019-10-25 ASSESSMENT — ASTHMA QUESTIONNAIRES: ACT_TOTALSCORE: 15

## 2019-10-25 ASSESSMENT — ANXIETY QUESTIONNAIRES: GAD7 TOTAL SCORE: 4

## 2019-10-30 ENCOUNTER — HOSPITAL ENCOUNTER (OUTPATIENT)
Dept: MRI IMAGING | Facility: CLINIC | Age: 39
Discharge: HOME | End: 2019-10-30
Attending: NURSE PRACTITIONER | Admitting: NURSE PRACTITIONER
Payer: COMMERCIAL

## 2019-10-30 PROCEDURE — 73221 MRI JOINT UPR EXTREM W/O DYE: CPT | Mod: LT

## 2019-11-01 ENCOUNTER — TELEPHONE (OUTPATIENT)
Dept: FAMILY MEDICINE | Facility: CLINIC | Age: 39
End: 2019-11-01

## 2019-11-01 DIAGNOSIS — G89.29 CHRONIC LEFT SHOULDER PAIN: Primary | ICD-10-CM

## 2019-11-01 DIAGNOSIS — M25.512 CHRONIC LEFT SHOULDER PAIN: Primary | ICD-10-CM

## 2019-11-15 DIAGNOSIS — I10 ESSENTIAL HYPERTENSION: Chronic | ICD-10-CM

## 2019-11-18 RX ORDER — METOPROLOL SUCCINATE 50 MG/1
TABLET, EXTENDED RELEASE ORAL
Qty: 30 TABLET | Refills: 1 | OUTPATIENT
Start: 2019-11-18

## 2019-11-18 NOTE — TELEPHONE ENCOUNTER
Pending Prescriptions:                       Disp   Refills    metoprolol succinate ER (TOPROL-XL) 50 MG*30 tab*1            Sig: TAKE 1 TABLET BY MOUTH EVERY DAY    Sent 10/24/2019 with 2 month supply. Refill not appropriate at this time.     Italia Aguirre, RN, BSN

## 2019-11-19 ENCOUNTER — THERAPY VISIT (OUTPATIENT)
Dept: PHYSICAL THERAPY | Facility: CLINIC | Age: 39
End: 2019-11-19
Payer: COMMERCIAL

## 2019-11-19 DIAGNOSIS — M25.512 CHRONIC LEFT SHOULDER PAIN: ICD-10-CM

## 2019-11-19 DIAGNOSIS — G89.29 CHRONIC LEFT SHOULDER PAIN: ICD-10-CM

## 2019-11-19 PROCEDURE — 97112 NEUROMUSCULAR REEDUCATION: CPT | Mod: GP | Performed by: PHYSICAL THERAPIST

## 2019-11-19 PROCEDURE — 97161 PT EVAL LOW COMPLEX 20 MIN: CPT | Mod: GP | Performed by: PHYSICAL THERAPIST

## 2019-11-19 PROCEDURE — 97110 THERAPEUTIC EXERCISES: CPT | Mod: GP | Performed by: PHYSICAL THERAPIST

## 2019-11-21 NOTE — PROGRESS NOTES
[facility] Initial Evaluation  Subjective:  Pt with primary complaint of L shoulder pain. Pt has had the shoulder pain for a long time to a small degree. States he has loose ligaments in the shoulders. He fell on the shoulder 2 years ago on the ice, worked with chiropractic with some relief. Now he has constant daily pain in the shoulder and into the neck on the L side. He gets some tingling in his fingers from pinched nerves in the neck as well. Pt with referral dated 11-1-19 and was referred to PT for evaluate and treat. Pain location is posterior in the inferior angle of the scapula as well as anterior in the shoulder joint. Pt L handed     The history is provided by the patient. No  was used.   Type of problem:  Left shoulder   Condition occurred with:  Unknown cause (appears combilnation, worst since fall). This is a chronic condition    Patient reports pain:  Anterior and scapular area. Radiates to:  Cervical, shoulder, upper arm, elbow, lower arm, wrist and hand (UE pain related to cervical issue, ). Associated symptoms:  Loss of motion/stiffness, tingling and numbness. Symptoms are exacerbated by using arm overhead (Driving posture, L handed ) and relieved by ice and rest.    Where condition occurred: for unknown reasons.  and reported as 6/10 on pain scale. General health as reported by patient is fair. Pertinent medical history includes:  Overweight, asthma and sleep disorder/apnea.  Medical allergies: No known   Surgeries include:  None.  Current medications:  Anti-depressants and high blood pressure medication.   Primary job tasks include:  Computer work, lifting/carrying and driving.  Pain is described as aching, shooting and stabbing and is constant. Pain is the same all the time (depends on activity ). Since onset symptoms are unchanged. Special tests:  MRI (Subscapularis tendinopathy ). Previous treatment includes chiropractic (temporary relief ). There was  mild improvement following previous treatment.   Patient is IT tech . Restrictions include:  Working in normal job without restrictions.    Barriers include:  None as reported by patient.  Red flags:  None as reported by patient.                      Objective:  Standing Alignment:    Cervical/Thoracic:  Forward head  Shoulder/UE:  Rounded shoulders                                       Shoulder Evaluation:  ROM:  AROM:    Flexion:  Left:  140    Right:  141    Abduction:  Left: 141   Right:  143      External Rotation:  Left:  82    Right:  82            Extension/Internal Rotation:  Left:  T7    Right:  T8          Strength:  : Upper and lower trap 4/5.  Flexion: Left:5/5   Pain:    Right: 5/5  Strong/painful     Pain:     Abduction:  Left: 5/5  Strong/painful  Pain:    Right: 5/5     Pain:    Internal Rotation:  Left:5/5     Pain:    Right: 5/5     Pain:  External Rotation:   Left:4/5     Pain:   Right:5/5     Pain:            Stability Testing:      Left shoulder stability negative testing:  Sulcus sign; Load and shift anterior and Load and shift posterior    Special Tests:    Left shoulder positive for the following special tests:  Labral and Impingement  Left shoulder negative for the following special tests:  Rotator cuff tear and Acromioclavicular    Palpation:  Palpation assessed shoulder: Lower trap L   Left shoulder tenderness present at:  Biceps; Supraspinatus; Rhomboids and Bicipital Groove  Left shoulder tenderness not present at: Clavicle; Sternoclavicular; Acrimioclavicular; Triceps; Infraspinatus; Teres Minor; Subscapularis; Deltoid; Levator or Upper Trap    Mobility Tests:  Mobility wnl shoulder: Mild medial scapular winging   Glenohumeral anterior left:  Normal    Glenohumeral posterior left:  Normal    Glenohumeral inferior left:  Normal      Scapulothoracic left:  Normal      Scapulohumeral rhythm right:  Normal                                     General     ROS    Assessment/Plan:    Patient  is a 39 year old male with left side shoulder complaints.    Patient has the following significant findings with corresponding treatment plan.                Diagnosis 1:  L shoulder chronic pain   Pain -  hot/cold therapy, manual therapy, self management, education, directional preference exercise and home program  Decreased strength - therapeutic exercise, therapeutic activities and home program  Inflammation - cold therapy and self management/home program  Impaired muscle performance - neuro re-education and home program  Decreased function - therapeutic activities and home program  Impaired posture - neuro re-education and home program    Therapy Evaluation Codes:   1) History comprised of:   Personal factors that impact the plan of care:      Time since onset of symptoms.    Comorbidity factors that impact the plan of care are:      Bowel/bladder changes and Overweight.     Medications impacting care: None.  2) Examination of Body Systems comprised of:   Body structures and functions that impact the plan of care:      Shoulder.   Activity limitations that impact the plan of care are:      Bathing, Driving, Lifting, Sitting, Throwing, Working, Sleeping and carrying .  3) Clinical presentation characteristics are:   Stable/Uncomplicated.  4) Decision-Making    Low complexity using standardized patient assessment instrument and/or measureable assessment of functional outcome.  Cumulative Therapy Evaluation is: Low complexity.    Previous and current functional limitations:  (See Goal Flow Sheet for this information)    Short term and Long term goals: (See Goal Flow Sheet for this information)     Communication ability:  Patient appears to be able to clearly communicate and understand verbal and written communication and follow directions correctly.  Treatment Explanation - The following has been discussed with the patient:   RX ordered/plan of care  Anticipated outcomes  Possible risks and side effects  This  patient would benefit from PT intervention to resume normal activities.   Rehab potential is good.    Frequency:  1 X week, once daily  Duration:  for 8 weeks  Discharge Plan:  Achieve all LTG.  Independent in home treatment program.  Reach maximal therapeutic benefit.    Please refer to the daily flowsheet for treatment today, total treatment time and time spent performing 1:1 timed codes.        no

## 2019-12-03 ENCOUNTER — TELEPHONE (OUTPATIENT)
Dept: FAMILY MEDICINE | Facility: CLINIC | Age: 39
End: 2019-12-03

## 2019-12-03 NOTE — TELEPHONE ENCOUNTER
Summary:    Patient is due/failing the following:   BP CHECK    Action needed:   Patient needs nurse only appointment.    Type of outreach:    Phone, spoke to patient.  patient will call back to schedule a nurse only visit.   Patient reports ED just passed away and will be out of town for a couple of weeks.  Questions for provider review:    None                                                                                                                                    Oly Hope CMA       Chart routed to Care Team .      Panel Management Review      Patient has the following on his problem list:     Asthma review     ACT Total Scores 10/24/2019   ACT TOTAL SCORE -   ASTHMA ER VISITS -   ASTHMA HOSPITALIZATIONS -   ACT TOTAL SCORE (Goal Greater than or Equal to 20) 15   In the past 12 months, how many times did you visit the emergency room for your asthma without being admitted to the hospital? 0   In the past 12 months, how many times were you hospitalized overnight because of your asthma? 0      1. Is Asthma diagnosis on the Problem List? Yes    2. Is Asthma listed on Health Maintenance? Yes    3. Patient is due for:  ACT    Diabetes    ASA: Passed    Last A1C  Lab Results   Component Value Date    A1C 7.7 10/24/2019    A1C 7.1 05/03/2019    A1C 7.6 02/21/2019    A1C 6.7 04/13/2018     A1C tested: Passed    Last LDL:    Lab Results   Component Value Date    CHOL 127 05/03/2019     Lab Results   Component Value Date    HDL 35 05/03/2019     Lab Results   Component Value Date    LDL 64 05/03/2019     Lab Results   Component Value Date    TRIG 141 05/03/2019     No results found for: CHOLHDLRATIO  Lab Results   Component Value Date    NHDL 92 05/03/2019       Is the patient on a Statin? YES             Is the patient on Aspirin? NO    Medications     HMG CoA Reductase Inhibitors     simvastatin (ZOCOR) 20 MG tablet             Last three blood pressure readings:  BP Readings from Last 3 Encounters:    10/24/19 (!) 142/104   07/01/19 130/80   05/03/19 130/82            Tobacco History:     History   Smoking Status     Never Smoker   Smokeless Tobacco     Never Used         Hypertension   Last three blood pressure readings:  BP Readings from Last 3 Encounters:   10/24/19 (!) 142/104   07/01/19 130/80   05/03/19 130/82     Blood pressure: FAILED    HTN Guidelines:  Less than 140/90      Composite cancer screening  Chart review shows that this patient is due/due soon for the following None

## 2019-12-18 ENCOUNTER — TELEPHONE (OUTPATIENT)
Dept: FAMILY MEDICINE | Facility: CLINIC | Age: 39
End: 2019-12-18

## 2019-12-18 DIAGNOSIS — I10 ESSENTIAL HYPERTENSION: Chronic | ICD-10-CM

## 2019-12-18 NOTE — TELEPHONE ENCOUNTER
Pending Prescriptions:                       Disp   Refills    metoprolol succinate ER (TOPROL-XL) 50 MG *30 tab*1        Sig: TAKE 1 TABLET BY MOUTH EVERY DAY    BP Readings from Last 3 Encounters:   10/24/19 (!) 142/104   07/01/19 130/80   05/03/19 130/82       Routing refill request to provider for review/approval because:  Labs out of range:  B/P    Italia Aguirre RN, BSN

## 2019-12-19 RX ORDER — METOPROLOL SUCCINATE 50 MG/1
TABLET, EXTENDED RELEASE ORAL
Qty: 30 TABLET | Refills: 0 | Status: SHIPPED | OUTPATIENT
Start: 2019-12-19 | End: 2020-01-20

## 2019-12-19 NOTE — TELEPHONE ENCOUNTER
Call pt. BP is out of range, need to potentially adjust medication. Ask for BP MA check prior to the holiday.#30 given.    LAWSON Hernandez CNP

## 2019-12-19 NOTE — TELEPHONE ENCOUNTER
Patient informed and he said he is out of town until Monday. He will call back to schedule once back in town    ROHINI Mayes

## 2020-01-17 DIAGNOSIS — I10 ESSENTIAL HYPERTENSION: Chronic | ICD-10-CM

## 2020-01-17 NOTE — TELEPHONE ENCOUNTER
Pending Prescriptions:                       Disp   Refills    metoprolol succinate ER (TOPROL-XL) 50 MG *30 tab*0        Sig: TAKE 1 TABLET BY MOUTH EVERY DAY    Routing refill request to provider for review/approval because:  Naomi given x1 and patient did not follow up, please advise  BP Readings from Last 3 Encounters:   10/24/19 (!) 142/104   07/01/19 130/80   05/03/19 130/82       Kev Luna, RN, BSN

## 2020-01-20 ENCOUNTER — TELEPHONE (OUTPATIENT)
Dept: FAMILY MEDICINE | Facility: CLINIC | Age: 40
End: 2020-01-20

## 2020-01-20 RX ORDER — METOPROLOL SUCCINATE 50 MG/1
TABLET, EXTENDED RELEASE ORAL
Qty: 14 TABLET | Refills: 0 | Status: SHIPPED | OUTPATIENT
Start: 2020-01-20 | End: 2020-01-24

## 2020-01-20 NOTE — TELEPHONE ENCOUNTER
Patient has OV on:    Next 5 appointments (look out 90 days)    Jan 24, 2020  8:40 AM CST  Office Visit with LAWSON Hicks CNP  [facility] ([facility] [address], [address]  [place] [id number] [phone]        Oly Hope CMA

## 2020-01-20 NOTE — TELEPHONE ENCOUNTER
Summary:    Patient is due/failing the following:   ACT and BP CHECK    Action needed:   Patient needs to do ACT. and Patient needs non-fasting lab only appointment    Type of outreach:    Sent letter.    Questions for provider review:    None                                                                                                                                    Oly Hope CMA       Chart routed to Care Team .

## 2020-01-20 NOTE — LETTER
[facility]  [address], [address]  [place] [address]  Phone: [phone]  Fax: [phone]  January 20, 2020      Josué Franco  [address] [address] [place], [place] [address] [address]      Dear Josué,    We care about your health and have reviewed your health plan including your medical conditions, medications, and lab results.  Based on this review, it is recommended that you follow up regarding the following health topic(s):  -Asthma  -High Blood Pressure    We recommend you take the following action(s):  -schedule a FREE FLOAT MA-ONLY BLOOD PRESSURE APPOINTMENT within the next 1-4 weeks.   -Complete and return the attached ASTHMA CONTROL TEST.  If your total score is 19 or less or you have been to the ER or urgent care for your asthma, then please schedule an asthma followup appointment.     Please call us at the [facility] - [phone] (or use [employer]) to address the above recommendations.     Thank you for trusting [facility] and we appreciate the opportunity to serve you.  We look forward to supporting your healthcare needs in the future.    Healthy Regards,    Your Health Care Team  [facility]

## 2020-01-21 NOTE — PROGRESS NOTES
Subjective     Josué Franco is a 39 year old male who presents to clinic today for the following health issues:    History of Present Illness        He eats 0-1 servings of fruits and vegetables daily.He consumes 2 sweetened beverage(s) daily.He exercises with enough effort to increase his heart rate 10 to 19 minutes per day.  He exercises with enough effort to increase his heart rate 3 or less days per week.   He is taking medications regularly.     Diabetes Follow-up    How often are you checking your blood sugar? Not at all    What concerns do you have today about your diabetes? None     Do you have any of these symptoms? (Select all that apply)  No numbness or tingling in feet.  No redness, sores or blisters on feet.  No complaints of excessive thirst.  No reports of blurry vision.  No significant changes to weight.     He relates since starting his metformin he has noticed more fatigue in the morning after waking. He notes that in the past he was borderline on his testosterone level and so is not sure if that could have lowered again.     BP Readings from Last 2 Encounters:   01/24/20 126/86   10/24/19 (!) 142/104     Hemoglobin A1C (%)   Date Value   01/24/2020 7.4 (H)   10/24/2019 7.7 (H)     LDL Cholesterol Calculated (mg/dL)   Date Value   05/03/2019 64   03/22/2019 68     Hyperlipidemia Follow-Up    Are you regularly taking any medication or supplement to lower your cholesterol?   Yes- zocor    Are you having muscle aches or other side effects that you think could be caused by your cholesterol lowering medication?  No    Hypertension Follow-up    Do you check your blood pressure regularly outside of the clinic? Yes     Are you following a low salt diet? No    Are your blood pressures ever more than 140 on the top number (systolic) OR more than 90 on the bottom number (diastolic), for example 140/90? No     Denies chest pan or shortness of breath    Anxiety Follow-Up    How are you doing with your anxiety  "since your last visit? No change    Are you having other symptoms that might be associated with anxiety? No    Have you had a significant life event? Grief or Loss - father in law passed away 2 months ago, was not unexpected as he and his wife had been taking care of him. Has been trying to maintain normalcy. He relates his wife has been grieving normally, but more on the \"heavy side of grief.\"     Are you feeling depressed? No    Do you have any concerns with your use of alcohol or other drugs? No     He is trying to exercise at work and notes that he feels his normal post-workout ache seems to be lasting longer than usual. He endorses being thirsty often, but could need more during his workouts. He has also been focusing on diet and nutrition with protein shakes to replace some meals.       Answers for HPI/ROS submitted by the patient on 1/24/2020   Chronic problems general questions HPI Form  If you checked off any problems, how difficult have these problems made it for you to do your work, take care of things at home, or get along with other people?: Somewhat difficult  PHQ9 TOTAL SCORE: 4  KRISTINA 7 TOTAL SCORE: 5      Social History     Tobacco Use     Smoking status: Never Smoker     Smokeless tobacco: Never Used   Substance Use Topics     Alcohol use: No     Comment: Very moderate 0-1/week     Drug use: No     KRISTINA-7 SCORE 5/3/2019 10/24/2019 1/24/2020   Total Score - - -   Total Score 3 (minimal anxiety) 4 (minimal anxiety) 5 (mild anxiety)   Total Score 3 4 5     PHQ 5/3/2019 10/24/2019 1/24/2020   PHQ-9 Total Score 3 6 4   Q9: Thoughts of better off dead/self-harm past 2 weeks Not at all Not at all Not at all     Last PHQ-9 1/24/2020   1.  Little interest or pleasure in doing things 0   2.  Feeling down, depressed, or hopeless 1   3.  Trouble falling or staying asleep, or sleeping too much 0   4.  Feeling tired or having little energy 1   5.  Poor appetite or overeating 1   6.  Feeling bad about yourself 1 "   7.  Trouble concentrating 0   8.  Moving slowly or restless 0   Q9: Thoughts of better off dead/self-harm past 2 weeks 0   PHQ-9 Total Score 4   Difficulty at work, home, or with people -     KRISTINA-7  1/24/2020   1. Feeling nervous, anxious, or on edge 1   2. Not being able to stop or control worrying 1   3. Worrying too much about different things 1   4. Trouble relaxing 1   5. Being so restless that it is hard to sit still 0   6. Becoming easily annoyed or irritable 0   7. Feeling afraid, as if something awful might happen 1   KRISTINA-7 Total Score 5   If you checked any problems, how difficult have they made it for you to do your work, take care of things at home, or get along with other people? -     Asthma Follow-Up  Was ACT completed today?    Yes    ACT Total Scores 1/24/2020   ACT TOTAL SCORE -   ASTHMA ER VISITS -   ASTHMA HOSPITALIZATIONS -   ACT TOTAL SCORE (Goal Greater than or Equal to 20) 23   In the past 12 months, how many times did you visit the emergency room for your asthma without being admitted to the hospital? 0   In the past 12 months, how many times were you hospitalized overnight because of your asthma? 0       How many days per week do you miss taking your asthma controller medication?  0    Please describe any recent triggers for your asthma: smoke and humidity    Have you had any Emergency Room Visits, Urgent Care Visits, or Hospital Admissions since your last office visit?  No    Patient Active Problem List   Diagnosis     INTERMITTENT ASTHMA      CARDIOVASCULAR SCREENING; LDL GOAL LESS THAN 160     Oligospermia     Testicular hypofunction     KRISTINA (generalized anxiety disorder)     BMI over 45     Essential hypertension     ADRIANNA (obstructive sleep apnea)- severe (AHI 82)     Type 2 diabetes mellitus with other circulatory complication, without long-term current use of insulin (H)     Non compliance w medication regimen     Hyperlipidemia LDL goal <100     Chronic left shoulder pain     Past  Surgical History:   Procedure Laterality Date     CHOLECYSTECTOMY  1997     LITHOTRIPSY  7/08    Lithotrypsy     NISSEN FUNDOPLICATION  2001    Nissen Fundiplication       Social History     Tobacco Use     Smoking status: Never Smoker     Smokeless tobacco: Never Used   Substance Use Topics     Alcohol use: No     Comment: Very moderate 0-1/week     Family History   Problem Relation Age of Onset     Coronary Artery Disease Father      Diabetes No family hx of      Hypertension No family hx of      Colon Cancer No family hx of          Current Outpatient Medications   Medication Sig Dispense Refill     albuterol (PROAIR HFA/PROVENTIL HFA/VENTOLIN HFA) 108 (90 Base) MCG/ACT inhaler Inhale 2 puffs into the lungs every 4 hours as needed for shortness of breath / dyspnea 1 Inhaler 5     albuterol (PROVENTIL) (2.5 MG/3ML) 0.083% neb solution INHALE CONTENTS OF 1 VIAL VIA NEBULIZER EVERY 4 HOURS AS NEEDED FOR WHEEZING. 1 Box 1     budesonide-formoterol (SYMBICORT) 80-4.5 MCG/ACT Inhaler Inhale 2 puffs into the lungs 2 times daily 1 Inhaler 3     FLUoxetine (PROZAC) 20 MG capsule Take 3 capsules (60 mg) by mouth daily 270 capsule 1     loratadine (CLARITIN) 10 MG tablet Take 10 mg by mouth       LORazepam (ATIVAN) 1 MG tablet Take 1 tablet (1 mg) by mouth daily as needed for anxiety 20 tablet 0     metFORMIN (GLUCOPHAGE-XR) 500 MG 24 hr tablet Take 2 tablets (1,000 mg) by mouth daily (with dinner) 180 tablet 0     metoprolol succinate ER (TOPROL-XL) 50 MG 24 hr tablet Take 1 tablet (50 mg) by mouth daily 90 tablet 3     order for DME CPAP 11 cm 1 Units 1     simvastatin (ZOCOR) 20 MG tablet Take 1 tablet (20 mg) by mouth At Bedtime 90 tablet 3     Allergies   Allergen Reactions     No Known Allergies      Recent Labs   Lab Test 01/24/20  0929 10/24/19  0919 05/03/19  0829 03/22/19  0906 02/21/19  0900  04/13/18  1637   A1C 7.4* 7.7* 7.1*  --  7.6*  --  6.7*   LDL  --   --  64 68 60  --   --    HDL  --   --  35* 33* 33*  --  "  --    TRIG  --   --  141 192* 177*  --   --    ALT 30  --  30 26 24   < >  --    CR 0.72 0.70  --   --  0.76  --   --    GFRESTIMATED >90 >90  --   --  >90  --   --    GFRESTBLACK >90 >90  --   --  >90  --   --    POTASSIUM 3.9 4.2  --   --  4.1  --   --    TSH  --  2.68  --   --   --   --  3.48    < > = values in this interval not displayed.      BP Readings from Last 3 Encounters:   01/24/20 126/86   10/24/19 (!) 142/104   07/01/19 130/80    Wt Readings from Last 3 Encounters:   01/24/20 (!) 165.5 kg (364 lb 12.8 oz)   10/24/19 (!) 166.5 kg (367 lb)   05/03/19 (!) 166 kg (366 lb)        Reviewed and updated as needed this visit by Provider         Review of Systems   ROS COMP: Constitutional, HEENT, cardiovascular, pulmonary, GI, , musculoskeletal, neuro, skin, endocrine and psych systems are negative, except as otherwise noted.    This document serves as a record of the services and decisions personally performed and made by Belkis Chadwick CNP. It was created on his/her behalf by Brandie Humphreys, trained medical scribe. The creation of this document is based the provider's statements to the medical scribes.    Scribto Humphreys 9:00 AM, January 24, 2020      Objective    /86 (BP Location: Left arm, Patient Position: Sitting, Cuff Size: Adult Large)   Pulse 80   Temp 97.3  F (36.3  C) (Temporal)   Resp 17   Ht 1.899 m (6' 2.76\")   Wt (!) 165.5 kg (364 lb 12.8 oz)   SpO2 96%   BMI 45.89 kg/m    Body mass index is 45.89 kg/m .     Physical Exam   GENERAL: healthy, alert and no distress  EYES: Eyes grossly normal to inspection, PERRL and conjunctivae and sclerae normal  HENT: ear canals and TM's normal, nose and mouth without ulcers or lesions  NECK: no adenopathy, no asymmetry, masses, or scars and thyroid normal to palpation  RESP: lungs clear to auscultation - no rales, rhonchi or wheezes  CV: regular rate and rhythm, normal S1 S2, no S3 or S4, no murmur, click or rub, no peripheral edema and " peripheral pulses strong  ABDOMEN: soft, nontender, no hepatosplenomegaly, no masses and bowel sounds normal   (male): normal male genitalia without lesions or urethral discharge, no hernia  MS: no gross musculoskeletal defects noted, no edema  SKIN: no suspicious lesions or rashes  NEURO: Normal strength and tone, mentation intact and speech normal  PSYCH: mentation appears normal, affect normal/bright  Diabetic foot exam: normal DP and PT pulses, no trophic changes or ulcerative lesions, normal sensory exam and normal monofilament exam     Diagnostic Test Results:  Labs reviewed in Epic  Results for orders placed or performed in visit on 01/24/20 (from the past 24 hour(s))   Albumin Random Urine Quantitative with Creat Ratio   Result Value Ref Range    Creatinine Urine 187 mg/dL    Albumin Urine mg/L 16 mg/L    Albumin Urine mg/g Cr 8.50 0 - 17 mg/g Cr   Hemoglobin A1c   Result Value Ref Range    Hemoglobin A1C 7.4 (H) 0 - 5.6 %   Comprehensive metabolic panel   Result Value Ref Range    Sodium 137 133 - 144 mmol/L    Potassium 3.9 3.4 - 5.3 mmol/L    Chloride 106 94 - 109 mmol/L    Carbon Dioxide 24 20 - 32 mmol/L    Anion Gap 7 3 - 14 mmol/L    Glucose 146 (H) 70 - 99 mg/dL    Urea Nitrogen 12 7 - 30 mg/dL    Creatinine 0.72 0.66 - 1.25 mg/dL    GFR Estimate >90 >60 mL/min/[1.73_m2]    GFR Estimate If Black >90 >60 mL/min/[1.73_m2]    Calcium 8.8 8.5 - 10.1 mg/dL    Bilirubin Total 0.5 0.2 - 1.3 mg/dL    Albumin 3.3 (L) 3.4 - 5.0 g/dL    Protein Total 7.1 6.8 - 8.8 g/dL    Alkaline Phosphatase 116 40 - 150 U/L    ALT 30 0 - 70 U/L    AST 13 0 - 45 U/L           Assessment & Plan       ICD-10-CM    1. Routine medical exam Z00.00    2. Essential hypertension I10 metoprolol succinate ER (TOPROL-XL) 50 MG 24 hr tablet     OFFICE/OUTPT VISIT,EST,LEVL IV   3. Type 2 diabetes mellitus with other circulatory complication, without long-term current use of insulin (H) E11.59 Albumin Random Urine Quantitative with Creat  Ratio     FOOT EXAM     metFORMIN (GLUCOPHAGE-XR) 500 MG 24 hr tablet     Hemoglobin A1c     Comprehensive metabolic panel     OFFICE/OUTPT VISIT,EST,LEVL IV   4. Fatigue, unspecified type R53.83 Testosterone, total     OFFICE/OUTPT VISIT,EST,LEVL IV      Discussed hypertension, asthma, diabetes, hyperlipidemia, mood, ADRIANNA, healthy diet, and regular exercise at length with patient today.    Physical exam completed today with normal diabetic foot exam completed today. Updated routine fasting screening lab work with total testosterone lab placed today; will notify with results.     Blood pressure is well controlled within target range. Mood is good and stable. Asthma is well controlled on current medications. Medications are well tolerated with no reported side effects. Plan to continue on current doses; Refills provided.    Advised that patient consider Trulicity injection for his diabetes and weight management. Medication therapy management referral provided for patient to schedule.    Influenza vaccination and Pneumonia-23 vaccination administered today.     Follow up with PCP in 6 months for medication management visit or prn.     The information in this document, created by the medical scribe for me, accurately reflects the services I personally performed and the decisions made by me. I have reviewed and approved this document for accuracy prior to leaving the patient care area.  Belkis Chadwick CNP  9:00 AM, 01/24/20    LAWSON Hernandez CNP  [facility]

## 2020-01-24 ENCOUNTER — OFFICE VISIT (OUTPATIENT)
Dept: FAMILY MEDICINE | Facility: CLINIC | Age: 40
End: 2020-01-24
Payer: COMMERCIAL

## 2020-01-24 VITALS
HEART RATE: 80 BPM | SYSTOLIC BLOOD PRESSURE: 126 MMHG | WEIGHT: 315 LBS | HEIGHT: 75 IN | TEMPERATURE: 97.3 F | OXYGEN SATURATION: 96 % | DIASTOLIC BLOOD PRESSURE: 86 MMHG | BODY MASS INDEX: 39.17 KG/M2 | RESPIRATION RATE: 17 BRPM

## 2020-01-24 DIAGNOSIS — E11.59 TYPE 2 DIABETES MELLITUS WITH OTHER CIRCULATORY COMPLICATION, WITHOUT LONG-TERM CURRENT USE OF INSULIN (H): ICD-10-CM

## 2020-01-24 DIAGNOSIS — R53.83 FATIGUE, UNSPECIFIED TYPE: ICD-10-CM

## 2020-01-24 DIAGNOSIS — Z00.00 ROUTINE MEDICAL EXAM: Primary | ICD-10-CM

## 2020-01-24 DIAGNOSIS — I10 ESSENTIAL HYPERTENSION: Chronic | ICD-10-CM

## 2020-01-24 LAB
ALBUMIN SERPL-MCNC: 3.3 G/DL (ref 3.4–5)
ALP SERPL-CCNC: 116 U/L (ref 40–150)
ALT SERPL W P-5'-P-CCNC: 30 U/L (ref 0–70)
ANION GAP SERPL CALCULATED.3IONS-SCNC: 7 MMOL/L (ref 3–14)
AST SERPL W P-5'-P-CCNC: 13 U/L (ref 0–45)
BILIRUB SERPL-MCNC: 0.5 MG/DL (ref 0.2–1.3)
BUN SERPL-MCNC: 12 MG/DL (ref 7–30)
CALCIUM SERPL-MCNC: 8.8 MG/DL (ref 8.5–10.1)
CHLORIDE SERPL-SCNC: 106 MMOL/L (ref 94–109)
CO2 SERPL-SCNC: 24 MMOL/L (ref 20–32)
CREAT SERPL-MCNC: 0.72 MG/DL (ref 0.66–1.25)
CREAT UR-MCNC: 187 MG/DL
GFR SERPL CREATININE-BSD FRML MDRD: >90 ML/MIN/{1.73_M2}
GLUCOSE SERPL-MCNC: 146 MG/DL (ref 70–99)
HBA1C MFR BLD: 7.4 % (ref 0–5.6)
MICROALBUMIN UR-MCNC: 16 MG/L
MICROALBUMIN/CREAT UR: 8.5 MG/G CR (ref 0–17)
POTASSIUM SERPL-SCNC: 3.9 MMOL/L (ref 3.4–5.3)
PROT SERPL-MCNC: 7.1 G/DL (ref 6.8–8.8)
SODIUM SERPL-SCNC: 137 MMOL/L (ref 133–144)

## 2020-01-24 PROCEDURE — 90472 IMMUNIZATION ADMIN EACH ADD: CPT | Performed by: NURSE PRACTITIONER

## 2020-01-24 PROCEDURE — 90686 IIV4 VACC NO PRSV 0.5 ML IM: CPT | Performed by: NURSE PRACTITIONER

## 2020-01-24 PROCEDURE — 84403 ASSAY OF TOTAL TESTOSTERONE: CPT | Performed by: NURSE PRACTITIONER

## 2020-01-24 PROCEDURE — 82043 UR ALBUMIN QUANTITATIVE: CPT | Performed by: NURSE PRACTITIONER

## 2020-01-24 PROCEDURE — 80053 COMPREHEN METABOLIC PANEL: CPT | Performed by: NURSE PRACTITIONER

## 2020-01-24 PROCEDURE — 83036 HEMOGLOBIN GLYCOSYLATED A1C: CPT | Performed by: NURSE PRACTITIONER

## 2020-01-24 PROCEDURE — 99207 C FOOT EXAM  NO CHARGE: CPT | Mod: 25 | Performed by: NURSE PRACTITIONER

## 2020-01-24 PROCEDURE — 99395 PREV VISIT EST AGE 18-39: CPT | Mod: 25 | Performed by: NURSE PRACTITIONER

## 2020-01-24 PROCEDURE — 90732 PPSV23 VACC 2 YRS+ SUBQ/IM: CPT | Performed by: NURSE PRACTITIONER

## 2020-01-24 PROCEDURE — 36415 COLL VENOUS BLD VENIPUNCTURE: CPT | Performed by: NURSE PRACTITIONER

## 2020-01-24 PROCEDURE — 99214 OFFICE O/P EST MOD 30 MIN: CPT | Mod: 25 | Performed by: NURSE PRACTITIONER

## 2020-01-24 PROCEDURE — 90471 IMMUNIZATION ADMIN: CPT | Performed by: NURSE PRACTITIONER

## 2020-01-24 RX ORDER — METFORMIN HCL 500 MG
1000 TABLET, EXTENDED RELEASE 24 HR ORAL
Qty: 180 TABLET | Refills: 0 | Status: SHIPPED | OUTPATIENT
Start: 2020-01-24 | End: 2020-04-03

## 2020-01-24 RX ORDER — METOPROLOL SUCCINATE 50 MG/1
50 TABLET, EXTENDED RELEASE ORAL DAILY
Qty: 90 TABLET | Refills: 3 | Status: SHIPPED | OUTPATIENT
Start: 2020-01-24 | End: 2020-12-09

## 2020-01-24 ASSESSMENT — ANXIETY QUESTIONNAIRES
4. TROUBLE RELAXING: SEVERAL DAYS
6. BECOMING EASILY ANNOYED OR IRRITABLE: NOT AT ALL
GAD7 TOTAL SCORE: 5
1. FEELING NERVOUS, ANXIOUS, OR ON EDGE: SEVERAL DAYS
7. FEELING AFRAID AS IF SOMETHING AWFUL MIGHT HAPPEN: SEVERAL DAYS
GAD7 TOTAL SCORE: 5
2. NOT BEING ABLE TO STOP OR CONTROL WORRYING: SEVERAL DAYS
3. WORRYING TOO MUCH ABOUT DIFFERENT THINGS: SEVERAL DAYS
7. FEELING AFRAID AS IF SOMETHING AWFUL MIGHT HAPPEN: SEVERAL DAYS
5. BEING SO RESTLESS THAT IT IS HARD TO SIT STILL: NOT AT ALL
GAD7 TOTAL SCORE: 5

## 2020-01-24 ASSESSMENT — PATIENT HEALTH QUESTIONNAIRE - PHQ9
SUM OF ALL RESPONSES TO PHQ QUESTIONS 1-9: 4
10. IF YOU CHECKED OFF ANY PROBLEMS, HOW DIFFICULT HAVE THESE PROBLEMS MADE IT FOR YOU TO DO YOUR WORK, TAKE CARE OF THINGS AT HOME, OR GET ALONG WITH OTHER PEOPLE: SOMEWHAT DIFFICULT
SUM OF ALL RESPONSES TO PHQ QUESTIONS 1-9: 4

## 2020-01-24 ASSESSMENT — PAIN SCALES - GENERAL: PAINLEVEL: NO PAIN (0)

## 2020-01-24 ASSESSMENT — MIFFLIN-ST. JEOR: SCORE: 2656.6

## 2020-01-24 NOTE — NURSING NOTE
Prior to immunization administration, verified patients identity using patient s name and date of birth. Please see Immunization Activity for additional information.     Screening Questionnaire for Pediatric Immunization    Is the child sick today?   No   Does the child have allergies to medications, food, a vaccine component, or latex?   No   Has the child had a serious reaction to a vaccine in the past?   No   Does the child have a long-term health problem with lung, heart, kidney or metabolic disease (e.g., diabetes), asthma, a blood disorder, no spleen, complement component deficiency, a cochlear implant, or a spinal fluid leak?  Is he/she on long-term aspirin therapy?   Yes. Asthma.   If the child to be vaccinated is 2 through 4 years of age, has a healthcare provider told you that the child had wheezing or asthma in the  past 12 months?   No   If your child is a baby, have you ever been told he or she has had intussusception?   No   Has the child, sibling or parent had a seizure, has the child had brain or other nervous system problems?   No   Does the child have cancer, leukemia, AIDS, or any immune system         problem?   No   Does the child have a parent, brother, or sister with an immune system problem?   No   In the past 3 months, has the child taken medications that affect the immune system such as prednisone, other steroids, or anticancer drugs; drugs for the treatment of rheumatoid arthritis, Crohn s disease, or psoriasis; or had radiation treatments?   No   In the past year, has the child received a transfusion of blood or blood products, or been given immune (gamma) globulin or an antiviral drug?   No   Is the child/teen pregnant or is there a chance that she could become       pregnant during the next month?   No   Has the child received any vaccinations in the past 4 weeks?   No      Immunization questionnaire answers were all negative.          Per orders of KL, injection of flu and P23 given by  Rosemary Tamayo. Patient instructed to remain in clinic for 15 minutes afterwards, and to report any adverse reaction to me immediately.

## 2020-01-25 LAB — TESTOST SERPL-MCNC: 194 NG/DL (ref 240–950)

## 2020-01-25 ASSESSMENT — ASTHMA QUESTIONNAIRES: ACT_TOTALSCORE: 23

## 2020-01-25 ASSESSMENT — ANXIETY QUESTIONNAIRES: GAD7 TOTAL SCORE: 5

## 2020-01-28 ENCOUNTER — MYC MEDICAL ADVICE (OUTPATIENT)
Dept: FAMILY MEDICINE | Facility: CLINIC | Age: 40
End: 2020-01-28

## 2020-01-28 DIAGNOSIS — R79.89 LOW TESTOSTERONE IN MALE: Primary | ICD-10-CM

## 2020-01-29 ENCOUNTER — OFFICE VISIT (OUTPATIENT)
Dept: PHARMACY | Facility: CLINIC | Age: 40
End: 2020-01-29
Payer: COMMERCIAL

## 2020-01-29 VITALS — DIASTOLIC BLOOD PRESSURE: 82 MMHG | HEART RATE: 74 BPM | SYSTOLIC BLOOD PRESSURE: 134 MMHG

## 2020-01-29 DIAGNOSIS — E78.5 HYPERLIPIDEMIA LDL GOAL <100: ICD-10-CM

## 2020-01-29 DIAGNOSIS — E11.59 TYPE 2 DIABETES MELLITUS WITH OTHER CIRCULATORY COMPLICATION, WITHOUT LONG-TERM CURRENT USE OF INSULIN (H): Primary | ICD-10-CM

## 2020-01-29 DIAGNOSIS — F41.1 GAD (GENERALIZED ANXIETY DISORDER): ICD-10-CM

## 2020-01-29 DIAGNOSIS — I10 ESSENTIAL HYPERTENSION: ICD-10-CM

## 2020-01-29 DIAGNOSIS — J45.20 MILD INTERMITTENT ASTHMA WITHOUT COMPLICATION: ICD-10-CM

## 2020-01-29 DIAGNOSIS — N46.11 OLIGOSPERMIA: ICD-10-CM

## 2020-01-29 PROCEDURE — 99607 MTMS BY PHARM ADDL 15 MIN: CPT | Performed by: PHARMACIST

## 2020-01-29 PROCEDURE — 99605 MTMS BY PHARM NP 15 MIN: CPT | Performed by: PHARMACIST

## 2020-01-29 RX ORDER — LANCETS
EACH MISCELLANEOUS
Qty: 100 EACH | Refills: 11 | Status: SHIPPED | OUTPATIENT
Start: 2020-01-29

## 2020-01-29 NOTE — PATIENT INSTRUCTIONS
Recommendations from today's MTM visit                                                      1. Start Ozempic (semaglutide) pen- inject 0.25 mg under the skin once weekly for 4 weeks then increase to 0.5 mg once weekly for at least 4 weeks.    2. Given AccuCheck blood glucose monitor. Sent prescription for lancets and test strips. Start checking blood sugars once daily in the morning before breakfast.     Next MTM visit: 1 week via [employer]    To schedule another MTM appointment, please call the clinic directly or you may call the MTM scheduling line at [phone] or toll-free at [phone].     My MTM Pharmacist's contact information:                                                      It was a pleasure seeing you today!  Please feel free to contact me with any questions or concerns you have.      Lisa De Guzman, PharmD  [phone]

## 2020-01-29 NOTE — PROGRESS NOTES
SUBJECTIVE/OBJECTIVE:                           Josué Franco is a 39 year old male coming in for an initial visit for Medication Therapy Management.  He was referred to me from Belkis Chadwick.    Chief Complaint: Diabetes management- Trulicity. Low testosterone level.  Personal Healthcare Goals: Lose weight, blood sugar management to help with fatigue (ongoing for about 3 years)    Allergies/ADRs: None  Tobacco:  reports that he has never smoked. He has never used smokeless tobacco.  Alcohol: 1-2 drinks/year  Caffeine: 1-2 sodas (20 ounce Sprite or Cherry Cola)/day, sometimes drinking iced coffee. Trying to cut out and no longer keeping in the house.   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   PMH: Reviewed in Epic    Medication Adherence/Access: No issues reported    Diabetes: Current medications include: metformin XR 1000mg daily. Difficult to assess changes in bowel movements, given he has had IBS since he was a child. Josué does not think he has experienced changes in bowel movements. Metformin was increased to 2 tablets of the 500mg about 6 months ago. He is not currently checking blood sugars. Denies having a blood glucose meter at home. Josué thinks his blood sugars runs higher more often, than not.     Lab Results   Component Value Date    A1C 7.4 01/24/2020    A1C 7.7 10/24/2019    A1C 7.1 05/03/2019    A1C 7.6 02/21/2019    A1C 6.7 04/13/2018   Recent symptoms of low blood sugar? Feels jittery after exercise  Recent symptoms of high blood sugar? Yes, thirsty, has to go to the bathroom more often  Eye exam: Up to Date  Foot exam: Up to Date  ACEi/ARB: No  Aspirin: No, not indicated based on age  Diet: Has 1-2 Body Armor drinks daily. Is trying to cut out soda. Did not cover diet.     Hypertension: Current medication include: metoprolol ER 50mg daily. Past medications  "used: atenolol. Denies side effects: lightheadedness, dizziness. Checks blood pressure at work and reports it being 130/mid-80s. No personal history of heart attack. Does have a family history of heart attack (father, who was a smoker).     BP Readings from Last 3 Encounters:   01/29/20 134/82   01/24/20 126/86   10/24/19 (!) 142/104     Hyperlipidemia: Previously he was taking simvastatin, but stopped due to difficulty getting refills from the pharmacy. His PCP was going to recheck lipid panel before restarting.     Recent Labs   Lab Test 05/03/19  0829 03/22/19  0906   CHOL 127 139   HDL 35* 33*   LDL 64 68   TRIG 141 192*     Low Testosterone: No medications. On 1/24/20 his testosterone was low at 194. The patient asked if testosterone replacement therapy would impact fertility.     Depression/Anxiety: Current medications include: fluoxetine 60mg daily and Lorazepam 1mg as needed (1 dose/month). He denies medication side effects. Lorazepam works well for anxiety attacks. He has been on other medications in the past: clonazepam, venlafaxine, and sertraline.     Asthma: Current medications include: Symbicort only as needed (once or twice a year), Albuterol as needed (1 dose/month), Albuterol nebs as needed. He does rinse out mouth after using Symbicort. Has had asthma since he was 9 years old. Denies medication side effects. His asthma triggers are: smoke, dust, weather changes (major), exercise.    Allergies: Current medication include: loratadine 10 mg daily. Spring is the \"bad\" season for him; he has itchy eyes, stuffy nose, etc. He denies medication side effects (dryness, fatigue).     Today's Vitals: /82   Pulse 74     ASSESSMENT:                            Medication Adherence: Excellent, no issues identified    Diabetes: Needs improvement. Patient is not meeting A1c goal of <7%. Would benefit from starting another agent, like a GLP-1 agonist. GLP-1 agonists have other benefits, including weight loss " and cardiovascular protection. Did not discuss at this appointment, but should consider increasing metformin dose to maximize his current therapy. Out of the GLP-1 agonists, semiglutide has the most weight loss associated with it. Discussed proper administration, storage and side effects. Patient would also benefit from having a meter to check his blood sugars. Educated patient on how to use meter and was given an Accu-chek guide meter.    Hypertension: Stable. Patient is meeting goal of <140/90. Patient may benefit from replacing beta-blocker with another anti-hypertensive in the future, like an ACE-I/ARB.     Hyperlipidemia: Needs improvement. Patient would benefit from having a lipid panel done since stopping simvastatin. According to the most recent cholesterol guidelines Josué does not meet criteria for prescribing a statin. Reconsider at age 40.     Low Testosterone: Testosterone replacement therapy has suppressive effect of testosterone on spermatogenesis, so it may impact fertility.     Depression/Anxiety: Stable.    Asthma: Stable. Asthma Action Plan done on 11/8/19.     Allergies: Stable.    PLAN:                            1. Start Ozempic (semaglutide) pen- inject 0.25 mg under the skin once weekly for 4 weeks then increase to 0.5 mg once weekly for at least 4 weeks.    2. Given AccuCheck blood glucose monitor. Sent prescription for lancets and test strips to pharmacy. Start checking blood sugars once daily in the morning before breakfast.     I spent 60 minutes with this patient today. All changes were made via collaborative practice agreement with Belkis Chadwick. A copy of the visit note was provided to the patient's primary care provider.    [facility]    The patient was given a summary of these recommendations as an after visit summary.     Haylee Iyer, Pharmacy IV Student  Lisa De Guzman, Pharm.D, BCACP  Medication Therapy Management Pharmacist

## 2020-03-03 ENCOUNTER — TELEPHONE (OUTPATIENT)
Dept: PHARMACY | Facility: CLINIC | Age: 40
End: 2020-03-03

## 2020-03-03 NOTE — TELEPHONE ENCOUNTER
Called patient. Patient picked up Ozempic and has been tolerating fairly well. He has been having a few issues with his stomach but nothing significant.  Patient hasn't noticed any improvement in blood sugars on the 0.25mg dose weekly. This week, was his first dose of the 0.5mg weekly. Will continue on this dose for another 3 weeks and then re-evaluate blood sugars.     Lisa De Guzman, Pharm.D, BCACP  Medication Therapy Management Pharmacist

## 2020-03-19 PROBLEM — M25.512 CHRONIC LEFT SHOULDER PAIN: Status: RESOLVED | Noted: 2019-11-19 | Resolved: 2020-03-19

## 2020-03-19 PROBLEM — G89.29 CHRONIC LEFT SHOULDER PAIN: Status: RESOLVED | Noted: 2019-11-19 | Resolved: 2020-03-19

## 2020-04-03 ENCOUNTER — VIRTUAL VISIT (OUTPATIENT)
Dept: FAMILY MEDICINE | Facility: CLINIC | Age: 40
End: 2020-04-03
Payer: COMMERCIAL

## 2020-04-03 DIAGNOSIS — E11.59 TYPE 2 DIABETES MELLITUS WITH OTHER CIRCULATORY COMPLICATION, WITHOUT LONG-TERM CURRENT USE OF INSULIN (H): Primary | ICD-10-CM

## 2020-04-03 DIAGNOSIS — F41.1 GAD (GENERALIZED ANXIETY DISORDER): Chronic | ICD-10-CM

## 2020-04-03 DIAGNOSIS — F41.9 ANXIETY: ICD-10-CM

## 2020-04-03 DIAGNOSIS — I10 ESSENTIAL HYPERTENSION: Chronic | ICD-10-CM

## 2020-04-03 DIAGNOSIS — E11.59 TYPE 2 DIABETES MELLITUS WITH OTHER CIRCULATORY COMPLICATION, WITHOUT LONG-TERM CURRENT USE OF INSULIN (H): ICD-10-CM

## 2020-04-03 PROCEDURE — 99214 OFFICE O/P EST MOD 30 MIN: CPT | Mod: TEL | Performed by: NURSE PRACTITIONER

## 2020-04-03 RX ORDER — LORAZEPAM 1 MG/1
1 TABLET ORAL DAILY PRN
Qty: 20 TABLET | Refills: 0 | Status: CANCELLED | OUTPATIENT
Start: 2020-04-03

## 2020-04-03 RX ORDER — SEMAGLUTIDE 1.34 MG/ML
INJECTION, SOLUTION SUBCUTANEOUS
Qty: 1.5 ML | Refills: 1 | Status: CANCELLED | OUTPATIENT
Start: 2020-04-03 | End: 2020-05-29

## 2020-04-03 RX ORDER — METFORMIN HCL 500 MG
1000 TABLET, EXTENDED RELEASE 24 HR ORAL
Qty: 180 TABLET | Refills: 0 | Status: SHIPPED | OUTPATIENT
Start: 2020-04-03 | End: 2020-07-27

## 2020-04-03 RX ORDER — SEMAGLUTIDE 1.34 MG/ML
0.5 INJECTION, SOLUTION SUBCUTANEOUS
Qty: 2 PEN | Refills: 1 | Status: SHIPPED | OUTPATIENT
Start: 2020-04-03 | End: 2020-05-19

## 2020-04-03 RX ORDER — LORAZEPAM 1 MG/1
1 TABLET ORAL DAILY PRN
Qty: 20 TABLET | Refills: 0 | Status: SHIPPED | OUTPATIENT
Start: 2020-04-03 | End: 2021-02-08

## 2020-04-03 RX ORDER — METFORMIN HCL 500 MG
1000 TABLET, EXTENDED RELEASE 24 HR ORAL
Qty: 180 TABLET | Refills: 0 | Status: CANCELLED | OUTPATIENT
Start: 2020-04-03

## 2020-04-03 ASSESSMENT — PATIENT HEALTH QUESTIONNAIRE - PHQ9
SUM OF ALL RESPONSES TO PHQ QUESTIONS 1-9: 3
5. POOR APPETITE OR OVEREATING: NOT AT ALL

## 2020-04-03 ASSESSMENT — ANXIETY QUESTIONNAIRES
2. NOT BEING ABLE TO STOP OR CONTROL WORRYING: SEVERAL DAYS
5. BEING SO RESTLESS THAT IT IS HARD TO SIT STILL: NOT AT ALL
7. FEELING AFRAID AS IF SOMETHING AWFUL MIGHT HAPPEN: SEVERAL DAYS
IF YOU CHECKED OFF ANY PROBLEMS ON THIS QUESTIONNAIRE, HOW DIFFICULT HAVE THESE PROBLEMS MADE IT FOR YOU TO DO YOUR WORK, TAKE CARE OF THINGS AT HOME, OR GET ALONG WITH OTHER PEOPLE: NOT DIFFICULT AT ALL
1. FEELING NERVOUS, ANXIOUS, OR ON EDGE: SEVERAL DAYS
3. WORRYING TOO MUCH ABOUT DIFFERENT THINGS: SEVERAL DAYS
GAD7 TOTAL SCORE: 5
6. BECOMING EASILY ANNOYED OR IRRITABLE: SEVERAL DAYS

## 2020-04-03 NOTE — TELEPHONE ENCOUNTER
Pending Prescriptions:                       Disp   Refills    FLUoxetine (PROZAC) 20 MG capsule         270 ca*1            Sig: Take 3 capsules (60 mg) by mouth daily    Prescription approved per [facility] Refill Protocol.        LORazepam (ATIVAN) 1 MG tablet            20 tab*0            Sig: Take 1 tablet (1 mg) by mouth daily as needed for           anxiety    Last Written Prescription Date:  2/21/2019  Last Fill Quantity: 20,  # refills: 0   Last office visit: 1/24/2020 with prescribing provider:     Future Office Visit:          metFORMIN (GLUCOPHAGE-XR) 500 MG 24 hr ta*180 ta*0            Sig: Take 2 tablets (1,000 mg) by mouth daily (with           Dinner)    Prescription approved per [facility] Refill Protocol.      Semaglutide,0.25 or 0.5MG/DOS, (OZEMPIC, *1.5 mL 1            Sig: Inject 0.25 mg Subcutaneous once a week for 28           days, THEN 0.5 mg once a week for 28 days.    Routing refill request to provider for review/approval because:  Drug not on the [facility] refill protocol     Italia Aguirre, MSN, RN

## 2020-04-03 NOTE — TELEPHONE ENCOUNTER
Patient calling to say that he is changing employers so wondering if he can get a 3 month supply for now until he finds out with insurance  Ativan, prozac, metformin,injection - meds and pharmacy pended

## 2020-04-03 NOTE — PROGRESS NOTES
"Subjective     Josué Franco is a 39 year old male who is being evaluated via a billable telephone visit.      The patient has been notified of following:     \"This telephone visit will be conducted via a call between you and your physician/provider. We have found that certain health care needs can be provided without the need for a physical exam.  This service lets us provide the care you need with a short phone conversation.  If a prescription is necessary we can send it directly to your pharmacy.  If lab work is needed we can place an order for that and you can then stop by our lab to have the test done at a later time.    If during the course of the call the physician/provider feels a telephone visit is not appropriate, you will not be charged for this service.\"     Patient has given verbal consent for Telephone visit?  Yes    Josué Franco complains of   Chief Complaint   Patient presents with     med check       ALLERGIES  No known allergies      Starting new computer job.     Diabetes Follow-up    How often are you checking your blood sugar? One time daily  What time of day are you checking your blood sugars (select all that apply)?  lunch time  Have you had any blood sugars above 200?  No  Have you had any blood sugars below 70?  No    Running 140's, now with Ozempic 120-110    What symptoms do you notice when your blood sugar is low?  None    What concerns do you have today about your diabetes? None     Do you have any of these symptoms? (Select all that apply)  Weight loss- intentional    Have you had a diabetic eye exam in the last 12 months? 1 year ago.       Down 20 lbs.         Hyperlipidemia Follow-Up      Are you regularly taking any medication or supplement to lower your cholesterol?   No    Are you having muscle aches or other side effects that you think could be caused by your cholesterol lowering medication?  NA    Trying to eat better.     Hypertension Follow-up      Do you check your blood " pressure regularly outside of the clinic? Yes     Are you following a low salt diet? No    Are your blood pressures ever more than 140 on the top number (systolic) OR more   than 90 on the bottom number (diastolic), for example 140/90? Yes    BP Readings from Last 2 Encounters:   01/29/20 134/82   01/24/20 126/86     Hemoglobin A1C (%)   Date Value   01/24/2020 7.4 (H)   10/24/2019 7.7 (H)     LDL Cholesterol Calculated (mg/dL)   Date Value   05/03/2019 64   03/22/2019 68         How many servings of fruits and vegetables do you eat daily?  2-3    On average, how many sweetened beverages do you drink each day (Examples: soda, juice, sweet tea, etc.  Do NOT count diet or artificially sweetened beverages)?   Cutting back - biggest diet change, probably 1-2 week    How many days per week do you exercise enough to make your heart beat faster? 2 days/week on weekends    How many minutes a day do you exercise enough to make your heart beat faster? 30 - 60    How many days per week do you miss taking your medication? 0    Mood- Controlled on Prozac, would like refill of Ativan. Feels panic approximately.   Takes ativan rare, but feels may need it more with Covid issues. Generally takes in afternoon- evening time frame   Sleep is good.             Reviewed and updated as needed this visit by Provider         Review of Systems   ROS COMP: Constitutional, HEENT, cardiovascular, pulmonary, gi and gu systems are negative, except as otherwise noted.       Objective   Reported vitals:  There were no vitals taken for this visit.   alert, active and no distress  Psych: Alert and oriented times 3; coherent speech, normal   rate and volume, able to articulate logical thoughts, able   to abstract reason, no tangential thoughts, no hallucinations   or delusions  His affect is normal/bright     Diagnostic Test Results:  Labs reviewed in Epic        Assessment/Plan:  1. Type 2 diabetes mellitus with other circulatory complication,  without long-term current use of insulin (H)  Likely improved.  - metFORMIN (GLUCOPHAGE-XR) 500 MG 24 hr tablet; Take 2 tablets (1,000 mg) by mouth daily (with dinner)  Dispense: 180 tablet; Refill: 0  - Semaglutide,0.25 or 0.5MG/DOS, (OZEMPIC, 0.25 OR 0.5 MG/DOSE,) 2 MG/1.5ML SOPN; Inject 0.5 mg Subcutaneous every 7 days  Dispense: 2 pen; Refill: 1    2. Anxiety  Stable, Covid stress  - LORazepam (ATIVAN) 1 MG tablet; Take 1 tablet (1 mg) by mouth daily as needed for anxiety  Dispense: 20 tablet; Refill: 0  Try 1/2 tab.     3. KRISTINA (generalized anxiety disorder)  Stable, covid stress  - FLUoxetine (PROZAC) 20 MG capsule; Take 3 capsules (60 mg) by mouth daily  Dispense: 270 capsule; Refill: 1    HTN- stable. Continue plan.     No follow-ups on file.     Home cares, increase exercise.   Re-check in 3 months in clinic- post Covid.   Refills given.       Phone call duration:  11 minutes    LAWSON Hernandez CNP

## 2020-04-04 ASSESSMENT — ANXIETY QUESTIONNAIRES: GAD7 TOTAL SCORE: 5

## 2020-04-27 ENCOUNTER — TELEPHONE (OUTPATIENT)
Dept: FAMILY MEDICINE | Facility: CLINIC | Age: 40
End: 2020-04-27

## 2020-04-27 DIAGNOSIS — E11.59 TYPE 2 DIABETES MELLITUS WITH OTHER CIRCULATORY COMPLICATION, WITHOUT LONG-TERM CURRENT USE OF INSULIN (H): ICD-10-CM

## 2020-04-27 NOTE — TELEPHONE ENCOUNTER
Patient requesting 90 day refill.  Pending Prescriptions:                       Disp   Refills    Semaglutide,0.25 or 0.5MG/DOS, (OZEMPIC, *2 pen  1            Sig: Inject 0.5 mg Subcutaneous every 7 days    Oly Hope, CMA

## 2020-04-28 NOTE — TELEPHONE ENCOUNTER
MTM- needing to evaluate blood sugar levels per last note.     Cristino Gracia, Can you follow-up with Josué?  Thanks,   LAWSON Hernandez CNP

## 2020-04-28 NOTE — TELEPHONE ENCOUNTER
Routing refill request to provider for review/approval because:  Drug not on the [facility] refill protocol     Mely Ceballos, RN, BSN

## 2020-04-29 RX ORDER — SEMAGLUTIDE 1.34 MG/ML
0.5 INJECTION, SOLUTION SUBCUTANEOUS
Qty: 2 PEN | Refills: 1 | OUTPATIENT
Start: 2020-04-29

## 2020-05-19 DIAGNOSIS — E11.59 TYPE 2 DIABETES MELLITUS WITH OTHER CIRCULATORY COMPLICATION, WITHOUT LONG-TERM CURRENT USE OF INSULIN (H): ICD-10-CM

## 2020-05-19 RX ORDER — SEMAGLUTIDE 1.34 MG/ML
0.5 INJECTION, SOLUTION SUBCUTANEOUS
Qty: 1 PEN | Refills: 0 | Status: SHIPPED | OUTPATIENT
Start: 2020-05-19 | End: 2020-06-03

## 2020-05-19 NOTE — TELEPHONE ENCOUNTER
Informed patient of message below and he will return call to clinic to schedule follow up visit.    Jody Xiong MA

## 2020-06-02 ENCOUNTER — TELEPHONE (OUTPATIENT)
Dept: FAMILY MEDICINE | Facility: CLINIC | Age: 40
End: 2020-06-02

## 2020-06-02 DIAGNOSIS — E11.59 TYPE 2 DIABETES MELLITUS WITH OTHER CIRCULATORY COMPLICATION, WITHOUT LONG-TERM CURRENT USE OF INSULIN (H): ICD-10-CM

## 2020-06-02 NOTE — TELEPHONE ENCOUNTER
Pharmacy requesting 90 day refill on the following;  Pending Prescriptions:                       Disp   Refills    Semaglutide,0.25 or 0.5MG/DOS, (OZEMPIC, *1 pen  0            Sig: Inject 0.5 mg Subcutaneous every 7 days      Oly Hope, CMA

## 2020-06-03 RX ORDER — SEMAGLUTIDE 1.34 MG/ML
0.5 INJECTION, SOLUTION SUBCUTANEOUS
Qty: 1 PEN | Refills: 0 | Status: SHIPPED | OUTPATIENT
Start: 2020-06-03 | End: 2021-01-05

## 2020-06-03 NOTE — TELEPHONE ENCOUNTER
Received 90 day refill request for the following medication.    Ozempic 0.25-0.5 MG dose pen.    Oly Hope, CMA

## 2020-07-16 ENCOUNTER — TELEPHONE (OUTPATIENT)
Dept: FAMILY MEDICINE | Facility: CLINIC | Age: 40
End: 2020-07-16

## 2020-07-16 NOTE — TELEPHONE ENCOUNTER
Summary:    Patient is due/failing the following:   Diabetic follow up, eye exam, BMP, A1C, ACT and LDL    Action needed:   Patient needs office visit for Diabetic follow up ., Patient needs to do ACT. and Patient needs fasting lab only appointment    Type of outreach:    Sent [employer] message.    Questions for provider review:    None                                                                                                                                    Oly Hope CMA       Chart routed to Care Team .          Panel Management Review      Patient has the following on his problem list:     Asthma review     ACT Total Scores 1/24/2020   ACT TOTAL SCORE -   ASTHMA ER VISITS -   ASTHMA HOSPITALIZATIONS -   ACT TOTAL SCORE (Goal Greater than or Equal to 20) 23   In the past 12 months, how many times did you visit the emergency room for your asthma without being admitted to the hospital? 0   In the past 12 months, how many times were you hospitalized overnight because of your asthma? 0      1. Is Asthma diagnosis on the Problem List? Yes    2. Is Asthma listed on Health Maintenance? Yes    3. Patient is due for:  ACT    Diabetes    ASA:    Last A1C  Lab Results   Component Value Date    A1C 7.4 01/24/2020    A1C 7.7 10/24/2019    A1C 7.1 05/03/2019    A1C 7.6 02/21/2019    A1C 6.7 04/13/2018     A1C tested: Passed    Last LDL:    Lab Results   Component Value Date    CHOL 127 05/03/2019     Lab Results   Component Value Date    HDL 35 05/03/2019     Lab Results   Component Value Date    LDL 64 05/03/2019     Lab Results   Component Value Date    TRIG 141 05/03/2019     No results found for: CHOLHDLRATIO  Lab Results   Component Value Date    NHDL 92 05/03/2019       Is the patient on a Statin? NO             Is the patient on Aspirin? NO    Medications     Salicylates     ASPIRIN NOT PRESCRIBED (INTENTIONAL)             Last three blood pressure readings:  BP Readings from Last 3 Encounters:   01/29/20  134/82   01/24/20 126/86   10/24/19 (!) 142/104          Tobacco History:     History   Smoking Status     Never Smoker   Smokeless Tobacco     Never Used         Hypertension   Last three blood pressure readings:  BP Readings from Last 3 Encounters:   01/29/20 134/82   01/24/20 126/86   10/24/19 (!) 142/104     Blood pressure: Passed    HTN Guidelines:  Less than 140/90      Composite cancer screening  Chart review shows that this patient is due/due soon for the following None

## 2020-07-25 DIAGNOSIS — E11.59 TYPE 2 DIABETES MELLITUS WITH OTHER CIRCULATORY COMPLICATION, WITHOUT LONG-TERM CURRENT USE OF INSULIN (H): ICD-10-CM

## 2020-07-27 RX ORDER — METFORMIN HCL 500 MG
1000 TABLET, EXTENDED RELEASE 24 HR ORAL
Qty: 60 TABLET | Refills: 0 | Status: SHIPPED | OUTPATIENT
Start: 2020-07-27 | End: 2020-10-27

## 2020-07-27 NOTE — TELEPHONE ENCOUNTER
Due for labs for further refills. Visit due in Oct- schedule labs now.   #30 days given. LAWSON Hernandez CNP

## 2020-07-27 NOTE — TELEPHONE ENCOUNTER
Pending Prescriptions:                       Disp   Refills    metFORMIN (GLUCOPHAGE-XR) 500 MG 24 hr tab*180 ta*0        Sig: TAKE 2 TABLETS (1,000 MG) BY MOUTH DAILY (WITH           DINNER)      Routing refill request to provider for review/approval because:  Labs not current:  A1C    Italia Aguirre, MSN, RN

## 2020-08-04 ENCOUNTER — TELEPHONE (OUTPATIENT)
Dept: PHARMACY | Facility: CLINIC | Age: 40
End: 2020-08-04

## 2020-08-04 NOTE — TELEPHONE ENCOUNTER
Called patient to schedule a follow up MTM visit. Patient will call back to schedule an appointment.   Lisa De Guzman, Pharm.D, [facility]  Medication Therapy Management Pharmacist

## 2020-08-22 DIAGNOSIS — E11.59 TYPE 2 DIABETES MELLITUS WITH OTHER CIRCULATORY COMPLICATION, WITHOUT LONG-TERM CURRENT USE OF INSULIN (H): ICD-10-CM

## 2020-08-24 NOTE — TELEPHONE ENCOUNTER
Pending Prescriptions:                       Disp   Refills    metFORMIN (GLUCOPHAGE-XR) 500 MG 24 hr tab*60 tab*0        Sig: TAKE 2 TABLETS (1,000 MG) BY MOUTH DAILY (WITH           DINNER)      Support staff:  Please call patient to schedule a visit. (F2F, video, phone, or E Visit) diabetic check and labs  Then ask if the patient will need a refill of the above medication before the visit.  Please reflag for RN and route to the Refill pool to give a narayan to get them to their next visit or to remove medication and to close the encounter.    Thank you,  Aminata Esparza, RN

## 2020-08-25 RX ORDER — METFORMIN HCL 500 MG
1000 TABLET, EXTENDED RELEASE 24 HR ORAL
Qty: 60 TABLET | Refills: 0 | OUTPATIENT
Start: 2020-08-25

## 2020-08-25 NOTE — TELEPHONE ENCOUNTER
Patient has a month of medication left.   He will call back to schedule appt before he needs any more

## 2020-09-08 ENCOUNTER — TELEPHONE (OUTPATIENT)
Dept: PHARMACY | Facility: CLINIC | Age: 40
End: 2020-09-08

## 2020-09-08 NOTE — TELEPHONE ENCOUNTER
Called patient to schedule a follow up MTM visit. Patient would like to have his labs drawn first and then schedule a follow up appointment. Patient will call back to schedule an appointment.    Lisa De Guzman, Pharm.D, [facility]  Medication Therapy Management Pharmacist

## 2020-09-24 ENCOUNTER — TELEPHONE (OUTPATIENT)
Dept: FAMILY MEDICINE | Facility: CLINIC | Age: 40
End: 2020-09-24

## 2020-09-24 NOTE — TELEPHONE ENCOUNTER
Summary:    Patient is due/failing the following:   CMP, A1C and LDL    Action needed:   Patient needs fasting lab only appointment    Type of outreach:    Phone, spoke to patient.  Patient will call back to schedule.     Questions for provider review:    None                                                                                                                                    Oly Hope CMA       Chart routed to Care Team .          Panel Management Review      Patient has the following on his problem list:     Diabetes    ASA: Passed    Last A1C  Lab Results   Component Value Date    A1C 7.4 01/24/2020    A1C 7.7 10/24/2019    A1C 7.1 05/03/2019    A1C 7.6 02/21/2019    A1C 6.7 04/13/2018     A1C tested: Passed    Last LDL:    Lab Results   Component Value Date    CHOL 127 05/03/2019     Lab Results   Component Value Date    HDL 35 05/03/2019     Lab Results   Component Value Date    LDL 64 05/03/2019     Lab Results   Component Value Date    TRIG 141 05/03/2019     No results found for: CHOLHDLRATIO  Lab Results   Component Value Date    NHDL 92 05/03/2019       Is the patient on a Statin? NO             Is the patient on Aspirin? NO    Medications     Salicylates     ASPIRIN NOT PRESCRIBED (INTENTIONAL)             Last three blood pressure readings:  BP Readings from Last 3 Encounters:   01/29/20 134/82   01/24/20 126/86   10/24/19 (!) 142/104            Tobacco History:     History   Smoking Status     Never Smoker   Smokeless Tobacco     Never Used         Hypertension   Last three blood pressure readings:  BP Readings from Last 3 Encounters:   01/29/20 134/82   01/24/20 126/86   10/24/19 (!) 142/104     Blood pressure: Passed    HTN Guidelines:  Less than 140/90      Composite cancer screening  Chart review shows that this patient is due/due soon for the following None

## 2020-10-25 DIAGNOSIS — F41.1 GAD (GENERALIZED ANXIETY DISORDER): Chronic | ICD-10-CM

## 2020-10-25 NOTE — TELEPHONE ENCOUNTER
Pending Prescriptions:                       Disp   Refills    FLUoxetine (PROZAC) 20 MG capsule [Pharma*270 ca*0            Sig: TAKE 3 CAPSULES BY MOUTH EVERY DAY    Medication is being filled for 1 time narayan refill only due to:  Patient is due for mood check    Please call and help schedule.  Thank you!  Aminata Esparza RN

## 2020-11-03 NOTE — TELEPHONE ENCOUNTER
Called patient today, reviewed the notes below. Patient stated that he would call back tomorrow and schedule labs and a virtual visit. Thank you

## 2020-11-09 ENCOUNTER — MYC MEDICAL ADVICE (OUTPATIENT)
Dept: FAMILY MEDICINE | Facility: CLINIC | Age: 40
End: 2020-11-09

## 2020-11-09 NOTE — TELEPHONE ENCOUNTER
Summary:    Patient is due/failing the following:   CMP, eye exam, tetanus and flu shots, A1C, AAP, ACT, FOLLOW UP and LDL    Action needed:   Patient needs office visit for recheck on meds. Due now.  Patient needs to do ACT. Sent mychart  Patient needs fasting lab only appointment. Orders placed.  Patient needs nurse only appointment for tetanus booster and flu shot.    Type of outreach:    Sent [employer] message.    Questions for provider review:    KRISTINE Juarez CMA (AAMA)       Chart routed to Care Team and provider .    Panel Management Review      Patient has the following on his problem list:     Asthma review     ACT Total Scores 1/24/2020   ACT TOTAL SCORE -   ASTHMA ER VISITS -   ASTHMA HOSPITALIZATIONS -   ACT TOTAL SCORE (Goal Greater than or Equal to 20) 23   In the past 12 months, how many times did you visit the emergency room for your asthma without being admitted to the hospital? 0   In the past 12 months, how many times were you hospitalized overnight because of your asthma? 0      1. Is Asthma diagnosis on the Problem List? Yes    2. Is Asthma listed on Health Maintenance? Yes    3. Patient is due for:  ACT and AAP    Diabetes    ASA: Not Required     Last A1C  Lab Results   Component Value Date    A1C 7.4 01/24/2020    A1C 7.7 10/24/2019    A1C 7.1 05/03/2019    A1C 7.6 02/21/2019    A1C 6.7 04/13/2018     A1C tested: Passed    Last LDL:    Lab Results   Component Value Date    CHOL 127 05/03/2019     Lab Results   Component Value Date    HDL 35 05/03/2019     Lab Results   Component Value Date    LDL 64 05/03/2019     Lab Results   Component Value Date    TRIG 141 05/03/2019     No results found for: BALBINA  Lab Results   Component Value Date    NHDL 92 05/03/2019       Is the patient on a Statin? NO             Is the patient on Aspirin? NO    Medications      Salicylates     ASPIRIN NOT PRESCRIBED (INTENTIONAL)             Last three blood pressure readings:  BP Readings from Last 3 Encounters:   01/29/20 134/82   01/24/20 126/86   10/24/19 (!) 142/104       Date of last diabetes office visit: virtual visit on 4/3/2020     Tobacco History:     History   Smoking Status     Never Smoker   Smokeless Tobacco     Never Used         Hypertension   Last three blood pressure readings:  BP Readings from Last 3 Encounters:   01/29/20 134/82   01/24/20 126/86   10/24/19 (!) 142/104     Blood pressure: Passed    HTN Guidelines:  Less than 140/90      Composite cancer screening  Chart review shows that this patient is due/due soon for the following None

## 2020-11-17 ENCOUNTER — TELEPHONE (OUTPATIENT)
Dept: PHARMACY | Facility: CLINIC | Age: 40
End: 2020-11-17

## 2020-11-17 NOTE — TELEPHONE ENCOUNTER
Called patient to schedule a follow up MTM visit. LM for patient to return call to schedule an appt.  Lisa De Guzman, Pharm.D, BCACP  Medication Therapy Management Pharmacist

## 2020-12-04 DIAGNOSIS — E11.59 TYPE 2 DIABETES MELLITUS WITH OTHER CIRCULATORY COMPLICATION, WITHOUT LONG-TERM CURRENT USE OF INSULIN (H): ICD-10-CM

## 2020-12-04 LAB
ALBUMIN SERPL-MCNC: 3.8 G/DL (ref 3.4–5)
ALP SERPL-CCNC: 108 U/L (ref 40–150)
ALT SERPL W P-5'-P-CCNC: 31 U/L (ref 0–70)
ANION GAP SERPL CALCULATED.3IONS-SCNC: 5 MMOL/L (ref 3–14)
AST SERPL W P-5'-P-CCNC: 12 U/L (ref 0–45)
BILIRUB SERPL-MCNC: 0.4 MG/DL (ref 0.2–1.3)
BUN SERPL-MCNC: 14 MG/DL (ref 7–30)
CALCIUM SERPL-MCNC: 9.1 MG/DL (ref 8.5–10.1)
CHLORIDE SERPL-SCNC: 106 MMOL/L (ref 94–109)
CHOLEST SERPL-MCNC: 137 MG/DL
CO2 SERPL-SCNC: 26 MMOL/L (ref 20–32)
CREAT SERPL-MCNC: 0.81 MG/DL (ref 0.66–1.25)
GFR SERPL CREATININE-BSD FRML MDRD: >90 ML/MIN/{1.73_M2}
GLUCOSE SERPL-MCNC: 125 MG/DL (ref 70–99)
HBA1C MFR BLD: 7.1 % (ref 0–5.6)
HDLC SERPL-MCNC: 38 MG/DL
LDLC SERPL CALC-MCNC: 69 MG/DL
NONHDLC SERPL-MCNC: 99 MG/DL
POTASSIUM SERPL-SCNC: 4.2 MMOL/L (ref 3.4–5.3)
PROT SERPL-MCNC: 8 G/DL (ref 6.8–8.8)
SODIUM SERPL-SCNC: 137 MMOL/L (ref 133–144)
TRIGL SERPL-MCNC: 152 MG/DL

## 2020-12-04 PROCEDURE — 80061 LIPID PANEL: CPT | Performed by: NURSE PRACTITIONER

## 2020-12-04 PROCEDURE — 80053 COMPREHEN METABOLIC PANEL: CPT | Performed by: NURSE PRACTITIONER

## 2020-12-04 PROCEDURE — 36415 COLL VENOUS BLD VENIPUNCTURE: CPT | Performed by: NURSE PRACTITIONER

## 2020-12-04 PROCEDURE — 83036 HEMOGLOBIN GLYCOSYLATED A1C: CPT | Performed by: NURSE PRACTITIONER

## 2020-12-06 ENCOUNTER — HEALTH MAINTENANCE LETTER (OUTPATIENT)
Age: 40
End: 2020-12-06

## 2020-12-08 ENCOUNTER — TELEPHONE (OUTPATIENT)
Dept: FAMILY MEDICINE | Facility: CLINIC | Age: 40
End: 2020-12-08

## 2020-12-08 DIAGNOSIS — E11.59 TYPE 2 DIABETES MELLITUS WITH OTHER CIRCULATORY COMPLICATION, WITHOUT LONG-TERM CURRENT USE OF INSULIN (H): ICD-10-CM

## 2020-12-08 DIAGNOSIS — I10 ESSENTIAL HYPERTENSION: Chronic | ICD-10-CM

## 2020-12-08 NOTE — TELEPHONE ENCOUNTER
Lm for patient to call us back    Please call with results.     Belkis is out of the office. Your A1c is at 7.1, fairly stable compared to previous numbers. Please set up an appointment with Belkis to discuss next steps.     Please call the clinic with any questions you may have.     Have a great day,     Dr. Varghese     (help patient schedule with belkis)

## 2020-12-09 RX ORDER — METFORMIN HCL 500 MG
1000 TABLET, EXTENDED RELEASE 24 HR ORAL
Qty: 120 TABLET | Refills: 0 | Status: SHIPPED | OUTPATIENT
Start: 2020-12-09 | End: 2021-01-05

## 2020-12-09 RX ORDER — METOPROLOL SUCCINATE 50 MG/1
TABLET, EXTENDED RELEASE ORAL
Qty: 90 TABLET | Refills: 3 | Status: SHIPPED | OUTPATIENT
Start: 2020-12-09 | End: 2022-01-11

## 2020-12-09 RX ORDER — METFORMIN HCL 500 MG
1000 TABLET, EXTENDED RELEASE 24 HR ORAL
Qty: 60 TABLET | Refills: 0 | Status: SHIPPED | OUTPATIENT
Start: 2020-12-09 | End: 2020-12-09

## 2020-12-09 NOTE — TELEPHONE ENCOUNTER
Call pt.   I see labs are done.   Can have DM visit in January with physical if needing to postpone,schedule for just after 1/24.  Update ACT.   LAWSON Hernandez CNP

## 2020-12-09 NOTE — TELEPHONE ENCOUNTER
Pending Prescriptions:                       Disp   Refills    metoprolol succinate ER (TOPROL-XL) 50 MG *90 tab*3        Sig: TAKE 1 TABLET BY MOUTH EVERY DAY    metFORMIN (GLUCOPHAGE-XR) 500 MG 24 hr tab*14 tab*0        Sig: TAKE 2 TABLETS (1,000 MG) BY MOUTH DAILY (WITH           DINNER)      Routing refill request to provider for review/approval because:  Naomi given x1 and patient did not follow up, please advise    Aminata Esparza RN

## 2020-12-18 ENCOUNTER — VIRTUAL VISIT (OUTPATIENT)
Dept: FAMILY MEDICINE | Facility: CLINIC | Age: 40
End: 2020-12-18
Payer: COMMERCIAL

## 2020-12-18 DIAGNOSIS — J45.21 MILD INTERMITTENT ASTHMA WITH EXACERBATION: Primary | ICD-10-CM

## 2020-12-18 PROCEDURE — 99213 OFFICE O/P EST LOW 20 MIN: CPT | Mod: TEL | Performed by: NURSE PRACTITIONER

## 2020-12-18 RX ORDER — ALBUTEROL SULFATE 0.83 MG/ML
SOLUTION RESPIRATORY (INHALATION)
Qty: 1 BOX | Refills: 1 | Status: SHIPPED | OUTPATIENT
Start: 2020-12-18 | End: 2024-07-23

## 2020-12-18 RX ORDER — PREDNISONE 20 MG/1
20 TABLET ORAL 2 TIMES DAILY
Qty: 10 TABLET | Refills: 0 | Status: SHIPPED | OUTPATIENT
Start: 2020-12-18 | End: 2021-02-08

## 2020-12-18 NOTE — LETTER
[facility]  [address], [address]  [place] [address]  Phone: [phone]  Fax: [phone]  December 18, 2020      Josué Franco  [address] [place] [address]  [place] [address]      Dear Josué,    We care about your health and have reviewed your health plan including your medical conditions, medications, and lab results.  Based on this review, it is recommended that you follow up regarding the following health topic(s):  -Asthma    We recommend you take the following action(s):   -Complete and return the attached ASTHMA CONTROL TEST.  If your total score is 19 or less or you have been to the ER or urgent care for your asthma, then please schedule an asthma followup appointment.     Please call us at the [facility] - [phone] (or use [employer]) to address the above recommendations.     Thank you for trusting [facility] and we appreciate the opportunity to serve you.  We look forward to supporting your healthcare needs in the future.    Healthy Regards,    Your Health Care Team  [facility]

## 2020-12-18 NOTE — PATIENT INSTRUCTIONS
Continue use of nebulizer and rescue inhaler course of oral steroid may make you jittery.     Return to clinic 3-4 days if symptoms do not improve or worsen.

## 2020-12-18 NOTE — LETTER
My Asthma Action Plan    Name: Josué Franco   YOB: 1980  Date: 12/18/2020   My doctor: LAWSON Myrick CNP   [facility]        My Rescue Medicine:   Albuterol inhaler (Proair/Ventolin/Proventil HFA)  2-4 puffs EVERY 4 HOURS as needed. Use a spacer if recommended by your provider.   My Asthma Severity:   Intermittent / Exercise Induced  Know your asthma triggers: cold air  weather changes ..etc..          GREEN ZONE   Good Control    I feel good    No cough or wheeze    Can work, sleep and play without asthma symptoms       Take your asthma control medicine every day.     1. If exercise triggers your asthma, take your rescue medication    15 minutes before exercise or sports, and    During exercise if you have asthma symptoms  2. Spacer to use with inhaler: If you have a spacer, make sure to use it with your inhaler             YELLOW ZONE Getting Worse  I have ANY of these:    I do not feel good    Cough or wheeze    Chest feels tight    Wake up at night   1. Keep taking your Green Zone medications  2. Start taking your rescue medicine:    every 20 minutes for up to 1 hour. Then every 4 hours for 24-48 hours.  3. If you stay in the Yellow Zone for more than 12-24 hours, contact your doctor.  4. If you do not return to the Green Zone in 12-24 hours or you get worse, start taking your oral steroid medicine if prescribed by your provider.           RED ZONE Medical Alert - Get Help  I have ANY of these:    I feel awful    Medicine is not helping    Breathing getting harder    Trouble walking or talking    Nose opens wide to breathe       1. Take your rescue medicine NOW  2. If your provider has prescribed an oral steroid medicine, start taking it NOW  3. Call your doctor NOW  4. If you are still in the Red Zone after 20 minutes and you have not reached your doctor:    Take your rescue medicine again and    Call 911 or go to the emergency room right away    See  your regular doctor within 2 weeks of an Emergency Room or Urgent Care visit for follow-up treatment.          Annual Reminders:  Meet with Asthma Educator,  Flu Shot in the Fall, consider Pneumonia Vaccination for patients with asthma (aged 19 and older).    Pharmacy:    [facility] - [place], [place] - [address]  [id number] - [place] - [address]    Electronically signed by LAWSON Myrick CNP   Date: 12/18/20                    Asthma Triggers  How To Control Things That Make Your Asthma Worse    Triggers are things that make your asthma worse.  Look at the list below to help you find your triggers and   what you can do about them. You can help prevent asthma flare-ups by staying away from your triggers.      Trigger                                                          What you can do   Cigarette Smoke  Tobacco smoke can make asthma worse. Do not allow smoking in your home, car or around you.  Be sure no one smokes at a child s day care or school.  If you smoke, ask your health care provider for ways to help you quit.  Ask family members to quit too.  Ask your health care provider for a referral to Quit Plan to help you quit smoking, or call 5-662-551-PLAN.     Colds, Flu, Bronchitis  These are common triggers of asthma. Wash your hands often.  Don t touch your eyes, nose or mouth.  Get a flu shot every year.     Dust Mites  These are tiny bugs that live in cloth or carpet. They are too small to see. Wash sheets and blankets in hot water every week.   Encase pillows and mattress in dust mite proof covers.  Avoid having carpet if you can. If you have carpet, vacuum weekly.   Use a dust mask and HEPA vacuum.   Pollen and Outdoor Mold  Some people are allergic to trees, grass, or weed pollen, or molds. Try to keep your windows closed.  Limit time out doors when pollen count is high.   Ask you health care provider about taking medicine during allergy season.      Animal Dander  Some people are allergic to skin flakes, urine or saliva from pets with fur or feathers. Keep pets with fur or feathers out of your home.    If you can t keep the pet outdoors, then keep the pet out of your bedroom.  Keep the bedroom door closed.  Keep pets off cloth furniture and away from stuffed toys.     Mice, Rats, and Cockroaches  Some people are allergic to the waste from these pests.   Cover food and garbage.  Clean up spills and food crumbs.  Store grease in the refrigerator.   Keep food out of the bedroom.   Indoor Mold  This can be a trigger if your home has high moisture. Fix leaking faucets, pipes, or other sources of water.   Clean moldy surfaces.  Dehumidify basement if it is damp and smelly.   Smoke, Strong Odors, and Sprays  These can reduce air quality. Stay away from strong odors and sprays, such as perfume, powder, hair spray, paints, smoke incense, paint, cleaning products, candles and new carpet.   Exercise or Sports  Some people with asthma have this trigger. Be active!  Ask your doctor about taking medicine before sports or exercise to prevent symptoms.    Warm up for 5-10 minutes before and after sports or exercise.     Other Triggers of Asthma  Cold air:  Cover your nose and mouth with a scarf.  Sometimes laughing or crying can be a trigger.  Some medicines and food can trigger asthma.

## 2020-12-18 NOTE — PROGRESS NOTES
"Josué Franco is a 40 year old male who is being evaluated via a billable telephone visit.      The patient has been notified of following:     \"This telephone visit will be conducted via a call between you and your physician/provider. We have found that certain health care needs can be provided without the need for a physical exam.  This service lets us provide the care you need with a short phone conversation.  If a prescription is necessary we can send it directly to your pharmacy.  If lab work is needed we can place an order for that and you can then stop by our lab to have the test done at a later time.    Telephone visits are billed at different rates depending on your insurance coverage. During this emergency period, for some insurers they may be billed the same as an in-person visit.  Please reach out to your insurance provider with any questions.    If during the course of the call the physician/provider feels a telephone visit is not appropriate, you will not be charged for this service.\"    Patient has given verbal consent for Telephone visit?  Yes    What phone number would you like to be contacted at? [phone]    How would you like to obtain your AVS? Franco    Subjective     Josué Franco is a 40 year old male who presents via phone visit today for the following health issues:    HPI     Asthma exacerbation...    Was ACT completed today?    Yes  He was planning to go right to [place] to complete this:    ACT Total Scores 1/24/2020   ACT TOTAL SCORE -   ASTHMA ER VISITS -   ASTHMA HOSPITALIZATIONS -   ACT TOTAL SCORE (Goal Greater than or Equal to 20) 23   In the past 12 months, how many times did you visit the emergency room for your asthma without being admitted to the hospital? 0   In the past 12 months, how many times were you hospitalized overnight because of your asthma? 0      short of breath and feels it in \"bottom of lungs   02 sats   97 - 98    slight cough   Has been tested for " Covid        How many days per week do you miss taking your asthma controller medication?  I do not have an asthma controller medication    Please describe any recent triggers for your asthma: weather changes ..etc..    Have you had any Emergency Room Visits, Urgent Care Visits, or Hospital Admissions since your last office visit?  No  Patient states he feels burning in deep lungs. He is having sob, coughing up phlegm occasionally, he states trigger is cold air.     Review of Systems   Constitutional, HEENT, cardiovascular, pulmonary, GI, , musculoskeletal, neuro, skin, endocrine and psych systems are negative, except as otherwise noted.       Objective        Vitals:  No vitals were obtained today due to virtual visit.    alert and no distress  PSYCH: Alert and oriented times 3; coherent speech, normal   rate and volume, able to articulate logical thoughts, able   to abstract reason, no tangential thoughts, no hallucinations   or delusions  His affect is normal  RESP: No cough, no audible wheezing, able to talk in full sentences  Remainder of exam unable to be completed due to telephone visits        Assessment & Plan     Mild intermittent asthma with exacerbation  Continue nebulizer as needed and will give course of oral steroid for exacerbation. Will return to clinic in 4 days if symptoms not improving.   Home care instructions were reviewed with the patient. The risks, benefits and treatment options of prescribed medications or other treatments have been discussed with the patient. The patient verbalized their understanding and should call or follow up if no improvement or if they develop further problems.    - predniSONE (DELTASONE) 20 MG tablet; Take 1 tablet (20 mg) by mouth 2 times daily  - albuterol (PROVENTIL) (2.5 MG/3ML) 0.083% neb solution; INHALE CONTENTS OF 1 VIAL VIA NEBULIZER EVERY 4 HOURS AS NEEDED FOR WHEEZING.              Patient Instructions   Continue use of nebulizer and rescue inhaler  course of oral steroid may make you jittery.     Return to clinic 3-4 days if symptoms do not improve or worsen.       No follow-ups on file.    LAWSON [facility]    Phone call duration:  10 minutes

## 2020-12-29 NOTE — TELEPHONE ENCOUNTER
No response from patient after multiple attempts. MTM will no longer be following. Would be happy to see patient again in the future. Routing to PCP as SADIQ.    Lisa De Guzman, Pharm.D, BCACP  Medication Therapy Management Pharmacist

## 2021-01-03 DIAGNOSIS — E11.59 TYPE 2 DIABETES MELLITUS WITH OTHER CIRCULATORY COMPLICATION, WITHOUT LONG-TERM CURRENT USE OF INSULIN (H): ICD-10-CM

## 2021-01-04 DIAGNOSIS — E11.59 TYPE 2 DIABETES MELLITUS WITH OTHER CIRCULATORY COMPLICATION, WITHOUT LONG-TERM CURRENT USE OF INSULIN (H): ICD-10-CM

## 2021-01-05 RX ORDER — METFORMIN HCL 500 MG
1000 TABLET, EXTENDED RELEASE 24 HR ORAL
Qty: 60 TABLET | Refills: 1 | Status: SHIPPED | OUTPATIENT
Start: 2021-01-05 | End: 2021-02-09

## 2021-01-05 RX ORDER — SEMAGLUTIDE 1.34 MG/ML
0.5 INJECTION, SOLUTION SUBCUTANEOUS
Qty: 1 PEN | Refills: 0 | Status: SHIPPED | OUTPATIENT
Start: 2021-01-05 | End: 2021-02-09

## 2021-01-05 NOTE — TELEPHONE ENCOUNTER
Prescription approved per [facility] Refill Protocol.  Beatriz Raza RN  January 5, 2021

## 2021-01-05 NOTE — TELEPHONE ENCOUNTER
Prescription approved per [facility] Refill Protocol.  Beatriz Raza RN  January 5, 2021

## 2021-01-14 DIAGNOSIS — F41.1 GAD (GENERALIZED ANXIETY DISORDER): Chronic | ICD-10-CM

## 2021-02-07 DIAGNOSIS — E11.59 TYPE 2 DIABETES MELLITUS WITH OTHER CIRCULATORY COMPLICATION, WITHOUT LONG-TERM CURRENT USE OF INSULIN (H): ICD-10-CM

## 2021-02-08 ENCOUNTER — VIRTUAL VISIT (OUTPATIENT)
Dept: FAMILY MEDICINE | Facility: CLINIC | Age: 41
End: 2021-02-08
Payer: COMMERCIAL

## 2021-02-08 DIAGNOSIS — E11.59 TYPE 2 DIABETES MELLITUS WITH OTHER CIRCULATORY COMPLICATION, WITHOUT LONG-TERM CURRENT USE OF INSULIN (H): ICD-10-CM

## 2021-02-08 DIAGNOSIS — B34.9 VIRAL SYNDROME: Primary | ICD-10-CM

## 2021-02-08 DIAGNOSIS — F41.9 ANXIETY: ICD-10-CM

## 2021-02-08 PROCEDURE — 99214 OFFICE O/P EST MOD 30 MIN: CPT | Mod: GT | Performed by: NURSE PRACTITIONER

## 2021-02-08 RX ORDER — LORAZEPAM 1 MG/1
1 TABLET ORAL DAILY PRN
Qty: 20 TABLET | Refills: 0 | Status: SHIPPED | OUTPATIENT
Start: 2021-02-08 | End: 2021-11-19

## 2021-02-08 ASSESSMENT — ANXIETY QUESTIONNAIRES
2. NOT BEING ABLE TO STOP OR CONTROL WORRYING: SEVERAL DAYS
GAD7 TOTAL SCORE: 5
7. FEELING AFRAID AS IF SOMETHING AWFUL MIGHT HAPPEN: NOT AT ALL
IF YOU CHECKED OFF ANY PROBLEMS ON THIS QUESTIONNAIRE, HOW DIFFICULT HAVE THESE PROBLEMS MADE IT FOR YOU TO DO YOUR WORK, TAKE CARE OF THINGS AT HOME, OR GET ALONG WITH OTHER PEOPLE: SOMEWHAT DIFFICULT
IF YOU CHECKED OFF ANY PROBLEMS ON THIS QUESTIONNAIRE, HOW DIFFICULT HAVE THESE PROBLEMS MADE IT FOR YOU TO DO YOUR WORK, TAKE CARE OF THINGS AT HOME, OR GET ALONG WITH OTHER PEOPLE: SOMEWHAT DIFFICULT
2. NOT BEING ABLE TO STOP OR CONTROL WORRYING: SEVERAL DAYS
1. FEELING NERVOUS, ANXIOUS, OR ON EDGE: SEVERAL DAYS
3. WORRYING TOO MUCH ABOUT DIFFERENT THINGS: SEVERAL DAYS
6. BECOMING EASILY ANNOYED OR IRRITABLE: SEVERAL DAYS
6. BECOMING EASILY ANNOYED OR IRRITABLE: SEVERAL DAYS
3. WORRYING TOO MUCH ABOUT DIFFERENT THINGS: SEVERAL DAYS
5. BEING SO RESTLESS THAT IT IS HARD TO SIT STILL: NOT AT ALL
1. FEELING NERVOUS, ANXIOUS, OR ON EDGE: SEVERAL DAYS
5. BEING SO RESTLESS THAT IT IS HARD TO SIT STILL: NOT AT ALL

## 2021-02-08 ASSESSMENT — PATIENT HEALTH QUESTIONNAIRE - PHQ9
5. POOR APPETITE OR OVEREATING: SEVERAL DAYS
5. POOR APPETITE OR OVEREATING: SEVERAL DAYS
SUM OF ALL RESPONSES TO PHQ QUESTIONS 1-9: 4

## 2021-02-08 NOTE — PROGRESS NOTES
Josué is a 40 year old who is being evaluated via a billable video visit.      How would you like to obtain your AVS? MyChart  If the video visit is dropped, the invitation should be resent by: Text to cell phone: [phone]  Will anyone else be joining your video visit? No    Video Start Time: 11:10 AM  1. Viral syndrome    2. Anxiety    3. Type 2 diabetes mellitus with other circulatory complication, without long-term current use of insulin (H)         Discussed viral syndrome, warning signs, home cares.  Monitoring of blood sugars and worsening symptoms.  Work note offered, as patient previously had Covid.  May return to work when feeling better.    Lorazepam is refilled.  Continue plan with medications.      Recheck in approximately 3 months for diabetic visit.    Subjective     Josué is a 40 year old who presents to clinic today for the following health issues     HPI       Acute Illness  Acute illness concerns:   Onset/Duration: yesterday   Symptoms:  Fever: no  Chills/Sweats: no  Headache (location?): no  Sinus Pressure: no  Conjunctivitis:  no  Ear Pain: no  Rhinorrhea: YES- last night   Congestion: no  Sore Throat: YES  Cough: no  Wheeze: no  Decreased Appetite: no  Nausea: YES  Vomiting: no cannot vomit due to stomach surgery  Diarrhea: no  Dysuria/Freq.: no  Dysuria or Hematuria: no  Fatigue/Achiness: YES- still recovering from covid a few months ago - is improving slowly.     Sick/Strep Exposure: no  Therapies tried and outcome: None    Today, mild achiness and tired.   Fever- no.  Eating and drinking well today.   BG levels today- normal range. 150.        Depression and Anxiety Follow-Up    How are you doing with your depression since your last visit? No change    How are you doing with your anxiety since your last visit?  Worsened     Are you having other symptoms that might be associated with depression or anxiety? Yes:  feeling more anxious with the lorazapam], uses approximately weekly.  There is  no specific trigger to panic.  Feels it is controlled with as needed use.    Have you had a significant life event? No     Do you have any concerns with your use of alcohol or other drugs? No    Social History     Tobacco Use     Smoking status: Never Smoker     Smokeless tobacco: Never Used   Substance Use Topics     Alcohol use: No     Comment: Very moderate 0-1/week     Drug use: No     PHQ 1/24/2020 4/3/2020 2/8/2021   PHQ-9 Total Score 4 3 4   Q9: Thoughts of better off dead/self-harm past 2 weeks Not at all Not at all Not at all     KRISTINA-7 SCORE 1/24/2020 4/3/2020 2/8/2021   Total Score - - -   Total Score 5 (mild anxiety) - -   Total Score 5 5 5     Last PHQ-9 2/8/2021   1.  Little interest or pleasure in doing things 1   2.  Feeling down, depressed, or hopeless 0   3.  Trouble falling or staying asleep, or sleeping too much 1   4.  Feeling tired or having little energy 1   5.  Poor appetite or overeating 0   6.  Feeling bad about yourself 1   7.  Trouble concentrating 0   8.  Moving slowly or restless 0   Q9: Thoughts of better off dead/self-harm past 2 weeks 0   PHQ-9 Total Score 4   Difficulty at work, home, or with people Not difficult at all     KRISTINA-7  2/8/2021   1. Feeling nervous, anxious, or on edge 1   2. Not being able to stop or control worrying 1   3. Worrying too much about different things 1   4. Trouble relaxing 1   5. Being so restless that it is hard to sit still 0   6. Becoming easily annoyed or irritable 1   7. Feeling afraid, as if something awful might happen 0   KRISTINA-7 Total Score 5   If you checked any problems, how difficult have they made it for you to do your work, take care of things at home, or get along with other people? Somewhat difficult       Suicide Assessment Five-step Evaluation and Treatment (SAFE-T)      Review of Systems   Constitutional, HEENT, cardiovascular, pulmonary, gi and gu systems are negative, except as otherwise noted.      Objective           Vitals:  No vitals  were obtained today due to virtual visit.    Physical Exam   GENERAL: Healthy, alert and no distress  EYES: Eyes grossly normal to inspection.  No discharge or erythema, or obvious scleral/conjunctival abnormalities.  RESP: No audible wheeze, cough, or visible cyanosis.  No visible retractions or increased work of breathing.    SKIN: Visible skin clear. No significant rash, abnormal pigmentation or lesions.  NEURO: Cranial nerves grossly intact.  Mentation and speech appropriate for age.  PSYCH: Mentation appears normal, affect normal/bright, judgement and insight intact, normal speech and appearance well-groomed.    Orders Only on 12/04/2020   Component Date Value Ref Range Status     Cholesterol 12/04/2020 137  <200 mg/dL Final     Triglycerides 12/04/2020 152* <150 mg/dL Final    Comment: Borderline high:  150-199 mg/dl  High:             200-499 mg/dl  Very high:       >499 mg/dl       HDL Cholesterol 12/04/2020 38* >39 mg/dL Final     LDL Cholesterol Calculated 12/04/2020 69  <100 mg/dL Final    Desirable:       <100 mg/dl     Non HDL Cholesterol 12/04/2020 99  <130 mg/dL Final     Sodium 12/04/2020 137  133 - 144 mmol/L Final     Potassium 12/04/2020 4.2  3.4 - 5.3 mmol/L Final     Chloride 12/04/2020 106  94 - 109 mmol/L Final     Carbon Dioxide 12/04/2020 26  20 - 32 mmol/L Final     Anion Gap 12/04/2020 5  3 - 14 mmol/L Final     Glucose 12/04/2020 125* 70 - 99 mg/dL Final     Urea Nitrogen 12/04/2020 14  7 - 30 mg/dL Final     Creatinine 12/04/2020 0.81  0.66 - 1.25 mg/dL Final     GFR Estimate 12/04/2020 >90  >60 mL/min/[1.73_m2] Final    Comment: Non  GFR Calc  Starting 12/18/2018, serum creatinine based estimated GFR (eGFR) will be   calculated using the Chronic Kidney Disease Epidemiology Collaboration   (CKD-EPI) equation.       GFR Estimate If Black 12/04/2020 >90  >60 mL/min/[1.73_m2] Final    Comment:  GFR Calc  Starting 12/18/2018, serum creatinine based estimated  GFR (eGFR) will be   calculated using the Chronic Kidney Disease Epidemiology Collaboration   (CKD-EPI) equation.       Calcium 12/04/2020 9.1  8.5 - 10.1 mg/dL Final     Bilirubin Total 12/04/2020 0.4  0.2 - 1.3 mg/dL Final     Albumin 12/04/2020 3.8  3.4 - 5.0 g/dL Final     Protein Total 12/04/2020 8.0  6.8 - 8.8 g/dL Final     Alkaline Phosphatase 12/04/2020 108  40 - 150 U/L Final     ALT 12/04/2020 31  0 - 70 U/L Final     AST 12/04/2020 12  0 - 45 U/L Final     Hemoglobin A1C 12/04/2020 7.1* 0 - 5.6 % Final    Comment: Normal <5.7% Prediabetes 5.7-6.4%  Diabetes 6.5% or higher - adopted from ADA   consensus guidelines.                   Video-Visit Details    Type of service:  Video Visit    Video End Time:11:19 AM    Originating Location (pt. Location): Home    Distant Location (provider location):  [facility]     Platform used for Video Visit: [employer]

## 2021-02-09 DIAGNOSIS — E11.59 TYPE 2 DIABETES MELLITUS WITH OTHER CIRCULATORY COMPLICATION, WITHOUT LONG-TERM CURRENT USE OF INSULIN (H): ICD-10-CM

## 2021-02-09 RX ORDER — METFORMIN HCL 500 MG
1000 TABLET, EXTENDED RELEASE 24 HR ORAL
Qty: 180 TABLET | Refills: 1 | Status: SHIPPED | OUTPATIENT
Start: 2021-02-09 | End: 2021-09-24

## 2021-02-09 RX ORDER — SEMAGLUTIDE 1.34 MG/ML
0.5 INJECTION, SOLUTION SUBCUTANEOUS
Qty: 1.52 ML | Refills: 0 | Status: SHIPPED | OUTPATIENT
Start: 2021-02-09 | End: 2021-11-19

## 2021-02-09 ASSESSMENT — ANXIETY QUESTIONNAIRES: GAD7 TOTAL SCORE: 5

## 2021-02-09 NOTE — TELEPHONE ENCOUNTER
Prescription approved per [facility] Refill Protocol.  Vanessa Pina RN on 2/9/2021 at 12:39 PM

## 2021-02-17 ENCOUNTER — TELEPHONE (OUTPATIENT)
Dept: FAMILY MEDICINE | Facility: CLINIC | Age: 41
End: 2021-02-17

## 2021-02-17 NOTE — LETTER
[facility]  [address], [address]  [place] [address]  Phone: [phone]  Fax: [phone]  March 8, 2021      Josué Franco  [address] [place] [address]  [place] [address]      Dear Josué,    We care about your health and have reviewed your health plan including your medical conditions, medications, and lab results.  Based on this review, it is recommended that you follow up regarding the following health topic(s):  -Asthma  -Diabetes  -Wellness (Physical) Visit     We recommend you take the following action(s):  -schedule a FOLLOWUP OFFICE APPOINTMENT.  We will perform the following labs:  A1c and Microablumin.   -Complete and return the attached ASTHMA CONTROL TEST.  If your total score is 19 or less or you have been to the ER or urgent care for your asthma, then please schedule an asthma followup appointment.     Please call us at the [facility] - [phone] (or use [employer]) to address the above recommendations.     Thank you for trusting [facility] and we appreciate the opportunity to serve you.  We look forward to supporting your healthcare needs in the future.    Healthy Regards,    Your Health Care Team  [facility]

## 2021-02-17 NOTE — TELEPHONE ENCOUNTER
Patient Quality Outreach Summary      Summary:    Patient is due/failing the following:   Annual wellness, date due: 01/24/2021   Eye exam  Foot exam  Microalbumin  ACT   Type of outreach:    Phone, spoke to patient/parent. will call back to schedule    Questions for provider review:    None                                                                                                                    Oly Hope CMA       Chart routed to Care Team.

## 2021-04-08 ENCOUNTER — IMMUNIZATION (OUTPATIENT)
Dept: NURSING | Facility: CLINIC | Age: 41
End: 2021-04-08
Payer: COMMERCIAL

## 2021-04-08 PROCEDURE — 0001A PR COVID VAC PFIZER DIL RECON 30 MCG/0.3 ML IM: CPT

## 2021-04-08 PROCEDURE — 91300 PR COVID VAC PFIZER DIL RECON 30 MCG/0.3 ML IM: CPT

## 2021-04-11 ENCOUNTER — HEALTH MAINTENANCE LETTER (OUTPATIENT)
Age: 41
End: 2021-04-11

## 2021-04-29 ENCOUNTER — IMMUNIZATION (OUTPATIENT)
Dept: NURSING | Facility: CLINIC | Age: 41
End: 2021-04-29
Attending: INTERNAL MEDICINE
Payer: COMMERCIAL

## 2021-04-29 PROCEDURE — 91300 PR COVID VAC PFIZER DIL RECON 30 MCG/0.3 ML IM: CPT

## 2021-04-29 PROCEDURE — 0002A PR COVID VAC PFIZER DIL RECON 30 MCG/0.3 ML IM: CPT

## 2021-05-13 ENCOUNTER — MYC MEDICAL ADVICE (OUTPATIENT)
Dept: FAMILY MEDICINE | Facility: CLINIC | Age: 41
End: 2021-05-13

## 2021-05-13 ENCOUNTER — TELEPHONE (OUTPATIENT)
Dept: FAMILY MEDICINE | Facility: CLINIC | Age: 41
End: 2021-05-13

## 2021-05-13 NOTE — TELEPHONE ENCOUNTER
Patient Quality Outreach Summary      Summary:    Patient is due/failing the following:   Diabetic Follow-Up Visit and Physical     Type of outreach:    Sent Thirsty message.    Questions for provider review:    None                                                                                                                    Oly Hope CMA       Chart routed to Care Team.

## 2021-05-13 NOTE — LETTER
[facility]  [address], [address]  [place] [address]  Phone: [phone]  Fax: [phone]  July 6, 2021      Josué Franco  [address] [address] [place] [address]  [place] [address]      Dear Josué,    We care about your health and have reviewed your health plan including your medical conditions, medications, and lab results.  Based on this review, it is recommended that you follow up regarding the following health topic(s):  -Asthma  -Diabetes  -Wellness (Physical) Visit     We recommend you take the following action(s):  -schedule a FOLLOWUP OFFICE APPOINTMENT.  We will perform the following labs:  A1c and Microablumin.   -Complete and return the attached ASTHMA CONTROL TEST.  If your total score is 19 or less or you have been to the ER or urgent care for your asthma, then please schedule an asthma followup appointment.     Please call us at the [facility] [phone] (or use [employer]) to address the above recommendations.     Thank you for trusting [facility] and we appreciate the opportunity to serve you.  We look forward to supporting your healthcare needs in the future.    Healthy Regards,    Your Health Care Team  [facility]

## 2021-06-06 ENCOUNTER — HEALTH MAINTENANCE LETTER (OUTPATIENT)
Age: 41
End: 2021-06-06

## 2021-07-24 DIAGNOSIS — F41.9 ANXIETY: ICD-10-CM

## 2021-07-26 RX ORDER — LORAZEPAM 1 MG/1
TABLET ORAL
Qty: 20 TABLET | Refills: 0 | OUTPATIENT
Start: 2021-07-26

## 2021-07-26 NOTE — TELEPHONE ENCOUNTER
Pending Prescriptions:                       Disp   Refills    LORazepam (ATIVAN) 1 MG tablet [Pharmacy M*20 tab*0        Sig: TAKE 1 TABLET BY MOUTH DAILY AS NEEDED FOR ANXIETY    Routing refill request to provider for review/approval because:  Drug not on the [facility] refill protocol

## 2021-07-31 DIAGNOSIS — F41.1 GAD (GENERALIZED ANXIETY DISORDER): Chronic | ICD-10-CM

## 2021-08-04 ENCOUNTER — TELEPHONE (OUTPATIENT)
Dept: FAMILY MEDICINE | Facility: CLINIC | Age: 41
End: 2021-08-04

## 2021-08-04 NOTE — TELEPHONE ENCOUNTER
Patient Quality Outreach Summary      Summary:    Patient is due/failing the following:   Physical , asthma and diabetic follow up     Type of outreach:    Phone, spoke to patient/parent. patient will call back to schedule     Questions for provider review:    None                                                                                                                    Oly Hope CMA     Chart routed to Care Team.

## 2021-09-23 DIAGNOSIS — E11.59 TYPE 2 DIABETES MELLITUS WITH OTHER CIRCULATORY COMPLICATION, WITHOUT LONG-TERM CURRENT USE OF INSULIN (H): ICD-10-CM

## 2021-09-24 RX ORDER — METFORMIN HCL 500 MG
TABLET, EXTENDED RELEASE 24 HR ORAL
Qty: 60 TABLET | Refills: 0 | Status: SHIPPED | OUTPATIENT
Start: 2021-09-24 | End: 2022-01-11

## 2021-09-26 ENCOUNTER — HEALTH MAINTENANCE LETTER (OUTPATIENT)
Age: 41
End: 2021-09-26

## 2021-11-04 ENCOUNTER — VIRTUAL VISIT (OUTPATIENT)
Dept: UROLOGY | Facility: CLINIC | Age: 41
End: 2021-11-04
Payer: COMMERCIAL

## 2021-11-04 DIAGNOSIS — N20.1 CALCULUS OF URETER: Primary | ICD-10-CM

## 2021-11-04 PROCEDURE — 99204 OFFICE O/P NEW MOD 45 MIN: CPT | Mod: GT | Performed by: UROLOGY

## 2021-11-04 RX ORDER — ACETAMINOPHEN 500 MG
500-1000 TABLET ORAL EVERY 6 HOURS PRN
COMMUNITY
End: 2022-01-11

## 2021-11-04 RX ORDER — IBUPROFEN 200 MG
200 TABLET ORAL EVERY 4 HOURS PRN
COMMUNITY
End: 2024-01-09

## 2021-11-04 ASSESSMENT — PAIN SCALES - GENERAL: PAINLEVEL: MODERATE PAIN (5)

## 2021-11-04 NOTE — PROGRESS NOTES
Assessment/Plan:    Assessment & Plan   Josué was seen today for new patient.    Diagnoses and all orders for this visit:    Calculus of ureter  -     Patient Stated Goal: Pass my stone  -     CT Abdomen Pelvis w/o Contrast; Future        Stone Management Plan  Stone Management 11/4/2021   Urinary Tract Infection No suspicion of infection   Renal Colic Well controlled symptoms   Renal Failure No suspicion of renal failure   Current CT date 10/26/2021   Right sided stones? Yes   R Number of ureteral stones 1   R GSD of ureteral stones 5   R Location of ureteral stone Proximal   R Number of kidney stones  No renal stones   R Hydronephrosis Mild   R Stone Event New event   Diagnosis date 10/26/2021   Initial location of primary symptomatic stone Proximal   Initial GSD of primary symptomatic stone 5   R MET status Initiation   R Current Plan MET   MET 1 week F/U   Left sided stones? No   L Stone Event No current event             PLAN    Will proceed with medical expulsive therapy. Risks and benefits were detailed of medical expulsive therapy including probability of stone passage, recurrent renal colic, and requirement of emergency medical and/or surgical care and further imaging. Patient verbalized understanding. Patient agrees with plan as discussed. Josué Franco will return in 1 weeks with low dose CT scan.    Over the counter symptom control medications of ibuprofen, Dramamine and Tylenol were recommended.    Video call duration: 12 minutes  17 minutes spent on the date of the encounter doing chart review, history and exam, documentation and further activities per the note    ALCIRA GAMEZ MD  [facility]    Subjective:     HPI    Josué Franco is a 41 year old  adult who is being evaluated via a billable video visit by [facility] new stone episode.    Josué Franco is a remotely recurrent unidentified composition stone former  who has not required stone clearance procedures. Josué Franco has not previously participated in stone risk evaluation. Josué Franco has no identified modifiable stone risk factors. Josué Franco has no identified non-modifiable stone risk factors.    Presented to [place] ED with acute right flank pain. Had some preceding symptoms while travelling in [place] the prior week. Pain has been under good control with OTC meds. No fever or chills. Passed a 2 mm left sided stone in 2008.    CT scan is personally reviewed and demonstrates a 5 mm right proximl ureteral stone with mild hydronephrosis.    Significant labs from presentation are not suspicious for infection..    ROS   Review of systems is negative except for HPI    Past Medical History:   Diagnosis Date     Asthma, persistent, severity to be determined      Diabetes mellitus type II, controlled (H)     with other complications     HTN (hypertension)      Morbid obesity (H)      Nephrolithiasis      ADRIANNA on CPAP        Past Surgical History:   Procedure Laterality Date     CHOLECYSTECTOMY  1997     LITHOTRIPSY  7/08    Lithotrypsy     NISSEN FUNDOPLICATION  2001    Nissen Fundiplication       Current Outpatient Medications   Medication Sig Dispense Refill     acetaminophen (TYLENOL) 500 MG tablet Take 500-1,000 mg by mouth every 6 hours as needed for mild pain       albuterol (PROAIR HFA/PROVENTIL HFA/VENTOLIN HFA) 108 (90 Base) MCG/ACT inhaler Inhale 2 puffs into the lungs every 4 hours as needed for shortness of breath / dyspnea 1 Inhaler 5     albuterol (PROVENTIL) (2.5 MG/3ML) 0.083% neb solution INHALE CONTENTS OF 1 VIAL VIA NEBULIZER EVERY 4 HOURS AS NEEDED FOR WHEEZING. 1 Box 1     ASPIRIN NOT PRESCRIBED (INTENTIONAL) Antiplatelet medication not prescribed intentionally due to        blood glucose (ACCU-CHEK GUIDE) test strip Use to test blood sugar one to two times daily or as directed. 100 strip 11     blood glucose monitoring (ACCU-CHEK  FASTCLIX) lancets Use to test blood sugar 1-2 times daily or as directed. 100 each 11     FLUoxetine (PROZAC) 20 MG capsule TAKE 3 CAPSULES BY MOUTH EVERY  capsule 1     ibuprofen (ADVIL/MOTRIN) 200 MG tablet Take 200 mg by mouth every 4 hours as needed for mild pain       LORazepam (ATIVAN) 1 MG tablet Take 1 tablet (1 mg) by mouth daily as needed for anxiety 20 tablet 0     metFORMIN (GLUCOPHAGE-XR) 500 MG 24 hr tablet TAKE 2 TABLETS (1,000 MG) BY MOUTH ONCE DAILY (WITH DINNER) 60 tablet 0     metoprolol succinate ER (TOPROL-XL) 50 MG 24 hr tablet TAKE 1 TABLET BY MOUTH EVERY DAY 90 tablet 3     order for DME CPAP 11 cm 1 Units 1     OZEMPIC, 0.25 OR 0.5 MG/DOSE, 2 MG/1.5ML SOPN INJECT 0.5 MG SUBCUTANEOUS EVERY 7 DAYS 1.52 mL 0       Allergies   Allergen Reactions     No Known Allergies        Social History     Socioeconomic History     Marital status: Single     Spouse name: Not on file     Number of children: 0     Years of education: Not on file     Highest education level: Not on file   Occupational History     Occupation: IT tech     Employer: [employer]     Comment: [employer]     Occupation: IT tech     Comment: [employer]   Tobacco Use     Smoking status: Never Smoker     Smokeless tobacco: Never Used   Substance and Sexual Activity     Alcohol use: No     Comment: Very moderate 0-1/week     Drug use: No     Sexual activity: Yes     Partners: Female   Other Topics Concern     Parent/sibling w/ CABG, MI or angioplasty before 65F 55M? Yes     Comment: FATHER, ANGIOPLASTY AGE 52   Social History Narrative     Not on file     Social Determinants of Health     Financial Resource Strain:      Difficulty of Paying Living Expenses:    Food Insecurity:      Worried About Running Out of Food in the Last Year:      Ran Out of Food in the Last Year:    Transportation Needs:      Lack of Transportation (Medical):      Lack of Transportation (Non-Medical):    Physical Activity:      Days of Exercise per Week:       Minutes of Exercise per Session:    Stress:      Feeling of Stress :    Social Connections:      Frequency of Communication with Friends and Family:      Frequency of Social Gatherings with Friends and Family:      Attends [employer] Services:      Active Member of Clubs or Organizations:      Attends Club or Organization Meetings:      Marital Status:    Intimate Partner Violence:      Fear of Current or Ex-Partner:      Emotionally Abused:      Physically Abused:      Sexually Abused:        Family History   Problem Relation Age of Onset     Coronary Artery Disease Father      Diabetes No family hx of      Hypertension No family hx of      Colon Cancer No family hx of        Objective:     No vitals or physical exam obtained due to virtual visit    LABS  Most Recent 3 CBC's:No lab results found.  Most Recent 3 BMP's:Recent Labs   Lab Test 12/04/20  1034 01/24/20  0929 10/24/19  0919    137 138   POTASSIUM 4.2 3.9 4.2   CHLORIDE 106 106 103   CO2 26 24 27   BUN 14 12 13   CR 0.81 0.72 0.70   ANIONGAP 5 7 8   BRAD 9.1 8.8 8.7   * 146* 181*     Most Recent 6 Bacteria Isolates From Any Culture (See EPIC Reports for Culture Details):No lab results found.  Most Recent Urinalysis:Recent Labs   Lab Test 07/14/14  1530   COLOR Yellow   APPEARANCE Clear   URINEGLC Negative   URINEBILI Negative   URINEKETONE Negative   SG >1.030   UBLD Negative   URINEPH 5.5   PROTEIN Negative   UROBILINOGEN 0.2   NITRITE Negative   LEUKEST Negative

## 2021-11-04 NOTE — PATIENT INSTRUCTIONS
Patient Stated Goal: Pass my stone  Symptom Control While Passing A Stone    The goal of [facility] is to let a smaller kidney stone (less than 4 to 5 mm) pass without intervention if possible. Giving your body a chance to clear the stone may take a few hours up to a few weeks.  Keeping you well-informed, safe and fairly comfortable is important.    Drink to thirst  Do not attempt to  flush out  your stone by drinking too much fluid. This does not work and may increase nausea. Drink enough to satisfy your body s thirst. Eating your normal diet is fine.   Medications (that may be suggested or prescribed)    Ibuprofen (Advil or Motrin) Available over the counter  o Take two (200mg) tablets every six hours until the stone passes.  o Prevents spasm of the ureter.    o Decreases pain.      Dramamine* (drowsy version, non-generic formulation) Available over the counter  o Take 50mg at bedtime  o Decreases spasms of the ureter  o Decreases nausea  o Decreases acute pain  o Decreases recurrence of pain for 24 hours  o Will help you sleep  *This medication will cause increase drowsiness, do not drive or operate machinery for 6 hours.      Narcotics (Percocet, Vicodin, Dilaudid) Take as prescribed for severe pain unrelieved by ibuprofen and Dramamine  o Narcotics have significant side effects and only  cover-up  pain. They have no effect on the cause of pain.  o Common side effects  - Confusion, disorientation and sedation - DO NOT DRIVE OR OPERATE MACHINERY WITHIN 24 HOURS  - Nausea - take Dramamine or Zofran or Haldol to help control  - Constipation  - Sleep disturbances      Ondansetron (Zofran) Take as prescribed  o Reserve for severe nausea  o May cause constipation, start over the counter Miralax if needed      Second Line Anti-Nausea Medication: Adding a different anti-nausea medication maybe helpful for persistent nausea.  The combined effect of different types of anti-nausea medications maybe more  effective than either medication by itself, even in higher doses.  o Compazine: Take as prescribed      Information about kidney stones    Crystals can form if chemicals are too concentrated in your urine. If the crystal grows over time, a stone may form. A stone usually isn t painful while it is still in the kidney.    As the stone begins to leave the kidney, you may experience episodes of flank pain as the kidney stone approaches the entrance to the ureter. Some people feel a vague ache in the side.    Kidney stones may fall into the ureter. Some stones are tiny and pass through without causing symptoms. The ureter is a small tube (approximately 1/8 of an inch wide). A kidney stone can get stuck and block the ureter. If this happens, urine backs up and flows back to the kidney. Back pressure on the kidney can cause:  o Severe flank pain radiating to the groin.  o Severe nausea and vomiting.  o The pain can occur in the lower back, side, groin or all three.      When the stone reaches the lower ureter, this can irritate the bladder and sensations of feeling the urge to urinate frequently and urgently may occur.      Once the stone passes out of your ureter and into your bladder, the symptoms of urgency and frequency will often disappear. Sometimes pain will come back for a short period and will not be as severe as before. The passage of the stone from your bladder and out of your body is usually not a problem. The urethra is at least twice as wide as the normal ureter, so the stone doesn t usually block it.    Strain all urine  If you pass the stone, save it. Place it in the container we have provided and bring it to the [facility] within a week of passing it. Your stone will then be sent for analysis which takes about a month.     Signs and symptoms you might experience    Nausea    Decreased appetite    Urinary frequency    Bloody urine     Chills    Fatigue    When to call [facility] or  go to the Emergency Room    Fever with a temperature greater than 100.1    Severe pain    Persistent nausea/vomiting    If the pain worsens or nausea/vomiting is uncontrolled with medications, STOP eating & drinking. You need to have an empty stomach for 8 hours prior to surgery. Call the [facility] immediately at [phone].           Follow-up    Low dose CT scan with doctor visit 1-2 weeks after initial clinic visit per doctor s instructions    Please cancel the CT scan visit if you pass a stone. Reschedule for a one month follow-up with doctor to discuss stone composition and future prevention.    Preventing future stones    Approximately a month after your stone is sent out for analysis, a prevention visit will occur with your provider, to discuss an individualized plan for prevention of new stones and to discuss managing stones that you may still have. Along with the analysis of the kidney stone, blood and urine tests may be indicated to develop this plan. Knowing the type of kidney stones you make, and why, allows the providers at the [facility] to recommend specific ways to prevent them.    Follow-up visits are an important part of monitoring and preventing future re-occurrences.    The [facility] is available for questions or concerns 24 hours a day at [phone]

## 2021-11-04 NOTE — PROGRESS NOTES
Patient is roomed via telephone for a virtual visit.  Patient confirmed he is in the [facility] at the time of this appointment.  Patient understands that this virtual visit is billable and agree to proceed with appointment.

## 2021-11-11 ENCOUNTER — VIRTUAL VISIT (OUTPATIENT)
Dept: UROLOGY | Facility: CLINIC | Age: 41
End: 2021-11-11
Payer: COMMERCIAL

## 2021-11-11 ENCOUNTER — HOSPITAL ENCOUNTER (OUTPATIENT)
Dept: CT IMAGING | Facility: CLINIC | Age: 41
Discharge: HOME | End: 2021-11-11
Attending: UROLOGY | Admitting: UROLOGY
Payer: COMMERCIAL

## 2021-11-11 DIAGNOSIS — N20.0 CALCULUS OF KIDNEY: ICD-10-CM

## 2021-11-11 DIAGNOSIS — R10.9 ACUTE RIGHT FLANK PAIN: ICD-10-CM

## 2021-11-11 DIAGNOSIS — N20.1 CALCULUS OF URETER: ICD-10-CM

## 2021-11-11 DIAGNOSIS — N20.1 CALCULUS OF URETER: Primary | ICD-10-CM

## 2021-11-11 PROCEDURE — 74176 CT ABD & PELVIS W/O CONTRAST: CPT

## 2021-11-11 PROCEDURE — 99214 OFFICE O/P EST MOD 30 MIN: CPT | Mod: GT | Performed by: PHYSICIAN ASSISTANT

## 2021-11-11 RX ORDER — OXYCODONE AND ACETAMINOPHEN 5; 325 MG/1; MG/1
1-2 TABLET ORAL
COMMUNITY
Start: 2021-10-26 | End: 2022-01-11

## 2021-11-11 ASSESSMENT — PAIN SCALES - GENERAL: PAINLEVEL: MILD PAIN (2)

## 2021-11-11 NOTE — PROGRESS NOTES
Assessment/Plan:    Assessment & Plan   Josué was seen today for follow up.    Diagnoses and all orders for this visit:    Calculus of ureter  -     Patient Stated Goal: Know what to expect after surgery    Acute right flank pain    Calculus of kidney    Stone Management Plan  Stone Management 11/4/2021 11/11/2021   Urinary Tract Infection No suspicion of infection No suspicion of infection   Renal Colic Well controlled symptoms Well controlled symptoms   Renal Failure No suspicion of renal failure No suspicion of renal failure   Current CT date 10/26/2021 11/11/2021   Right sided stones? Yes Yes   R Number of ureteral stones 1 1   R GSD of ureteral stones 5 5   R Location of ureteral stone Proximal Proximal   R Number of kidney stones  No renal stones 1   R GSD of kidney stones - < 2   R Hydronephrosis Mild None   R Stone Event New event Established event   Diagnosis date 10/26/2021 -   Initial location of primary symptomatic stone Proximal -   Initial GSD of primary symptomatic stone 5 -   R MET status Initiation No progression   R Current Plan MET Clear   MET 1 week F/U -   Clear rationale - MET failure   Left sided stones? No No   L Stone Event No current event No current event         PLAN    40 yo pleasant diabetic, obese M with remotely recurrent kidney stone disease. Nonprogressing right proximal ureteral stone with persistent right sided pain. Tiny right renal stone.    Josué Franco has failed adequate duration of medical expulsive therapy and will proceed to the operating room for ureteroscopic stone clearance. Risks and benefits were detailed of ureteroscopic stone clearance including potential issues of urinary or systemic infection, ureteral injury, inaccessible stone, incomplete stone clearance, multiple surgeries, and stent related symptoms of urgency, frequency and hematuria. Preoperative clearance with PCP requested.    Video call duration: 13 minutes  18 minutes spent on the date of the  encounter doing chart review, history and exam, documentation and further activities per the note    Mayra Vance PA-C  [facility]    HPI    Josué Franco is a 41 year old  adult who is being evaluated via a billable video visit by [facility] for medical expulsive therapy follow up.     On last encounter, Josué Franco's 5 mm stone was in right proximal ureter with mild hydronephrosis. Josué Franco has had no unanticipated events.    He has had ongoing, mild to moderate right flank pain. At times, it has radiated into the right abdomen. He denies symptoms of fever, chills, nausea, vomiting, urinary frequency and dysuria.     New CT scan was personally reviewed and demonstrates no progression of stone without hydronephrosis. Tiny right renal papillary tip calcification unchanged.    ROS   Review of systems is negative except for HPI.    Past Medical History:   Diagnosis Date     Asthma, persistent, severity to be determined      Diabetes mellitus type II, controlled (H)     with other complications     HTN (hypertension)      Morbid obesity (H)      Nephrolithiasis      ADRIANNA on CPAP      Past Surgical History:   Procedure Laterality Date     CHOLECYSTECTOMY  1997     LITHOTRIPSY  7/08    Lithotrypsy     NISSEN FUNDOPLICATION  2001    Nissen Fundiplication     Current Outpatient Medications   Medication Sig Dispense Refill     acetaminophen (TYLENOL) 500 MG tablet Take 500-1,000 mg by mouth every 6 hours as needed for mild pain       albuterol (PROAIR HFA/PROVENTIL HFA/VENTOLIN HFA) 108 (90 Base) MCG/ACT inhaler Inhale 2 puffs into the lungs every 4 hours as needed for shortness of breath / dyspnea 1 Inhaler 5     albuterol (PROVENTIL) (2.5 MG/3ML) 0.083% neb solution INHALE CONTENTS OF 1 VIAL VIA NEBULIZER EVERY 4 HOURS AS NEEDED FOR WHEEZING. 1 Box 1     ASPIRIN NOT PRESCRIBED (INTENTIONAL) Antiplatelet medication not prescribed  intentionally       blood glucose (ACCU-CHEK GUIDE) test strip Use to test blood sugar one to two times daily or as directed. 100 strip 11     blood glucose monitoring (ACCU-CHEK FASTCLIX) lancets Use to test blood sugar 1-2 times daily or as directed. 100 each 11     FLUoxetine (PROZAC) 20 MG capsule TAKE 3 CAPSULES BY MOUTH EVERY  capsule 1     ibuprofen (ADVIL/MOTRIN) 200 MG tablet Take 200 mg by mouth every 4 hours as needed for mild pain       LORazepam (ATIVAN) 1 MG tablet Take 1 tablet (1 mg) by mouth daily as needed for anxiety 20 tablet 0     metFORMIN (GLUCOPHAGE-XR) 500 MG 24 hr tablet TAKE 2 TABLETS (1,000 MG) BY MOUTH ONCE DAILY (WITH DINNER) 60 tablet 0     metoprolol succinate ER (TOPROL-XL) 50 MG 24 hr tablet TAKE 1 TABLET BY MOUTH EVERY DAY 90 tablet 3     order for DME CPAP 11 cm 1 Units 1     OZEMPIC, 0.25 OR 0.5 MG/DOSE, 2 MG/1.5ML SOPN INJECT 0.5 MG SUBCUTANEOUS EVERY 7 DAYS 1.52 mL 0       Allergies   Allergen Reactions     No Known Allergies        Social History     Socioeconomic History     Marital status: Single     Spouse name: Not on file     Number of children: 0     Years of education: Not on file     Highest education level: Not on file   Occupational History     Occupation: IT tech     Employer: [employer]     Comment: IT     Occupation: IT tech     Comment: [employer]   Tobacco Use     Smoking status: Never Smoker     Smokeless tobacco: Never Used   Substance and Sexual Activity     Alcohol use: No     Comment: Very moderate 0-1/week     Drug use: No     Sexual activity: Yes     Partners: Female   Other Topics Concern     Parent/sibling w/ CABG, MI or angioplasty before 65F 55M? Yes     Comment: FATHER, ANGIOPLASTY AGE 52   Social History Narrative     Not on file     Social Determinants of Health     Financial Resource Strain: Not on file   Food Insecurity: Not on file   Transportation Needs: Not on file   Physical Activity: Not on file   Stress: Not on file   Social  Connections: Not on file   Intimate Partner Violence: Not on file   Housing Stability: Not on file       Family History   Problem Relation Age of Onset     Coronary Artery Disease Father      Diabetes No family hx of      Hypertension No family hx of      Colon Cancer No family hx of        Objective:     Appears AAO x 3  No vitals obtained due to virtual visit

## 2021-11-11 NOTE — Clinical Note
RIGHT URS/LASER and STENT INSERTION-please wait to call patient 11/12  Needs COVID and PREOP-check weight restriction

## 2021-11-11 NOTE — PROGRESS NOTES
Patient is roomed via telephone for a virtual visit.  Patient confirmed he is in the [facility] at the time of this appointment.  Patient understands that this virtual visit is billable and agree to proceed with appointment.

## 2021-11-11 NOTE — PATIENT INSTRUCTIONS
Patient Stated Goal: Know what to expect after surgery  Ureteroscopy    Ureteroscopy is a procedure which is done for clearance of stones from the ureter, kidney or both. There are no incisions involved. The procedure involves your surgeon placing a small scope into your urethra. This is the opening where urine leaves your body.  The surgeon watches as they carefully guide the scope to the stone(s).  Modern flexible ureteroscopes can be used to reach virtually any location within the urinary tract.     The size, shape and location of the stone determines how best to treat the stone(s).  Whenever possible, stones are removed in one piece.  Larger stones need to be broken using a laser before removing in smaller pieces.  The goal is to remove all stones and stone fragments from that side of the body in a single treatment.  Complete stone clearance is an important step to prevent future kidney stone episodes.    Surgery:    Same day outpatient procedure    30-60 minutes    Procedure done in hospital surgical suite    General anesthesia (you will be asleep during the procedure)     Antibiotic prior to surgery to prevent infection    Physician will visit with you and respond to any questions or concerns and consent will be signed prior to going to the operating room    Risks:    Infection - Preoperative antibiotics should prevent new infections but it is possible that unanticipated bacteria may be introduced at time of surgery or that the stones were actually chronically infected before surgery      Injury - The ureter may be injured during this procedure.  This is most likely to happen if the ureter was very inflamed before surgery or if a stone is very tightly impacted.  The surgeon will not aggressively treat a stone if this creates a risk of injury.        Inaccessible Stones -A single procedure is effective in 95% of cases, but if your ureter is very narrow or your kidney stone is very impacted, a stent will be  placed and the procedure stopped.  In 1-2 weeks after the ureter has relaxed, the patient will be brought back to surgery and the procedure can be safely performed.      Incomplete stone clearance -Occasionally stone or stone fragments may not be completely cleared.  These may pass on their own, which may cause discomfort.  Our goal is to remove all possible stones and fragments.    Stent:      An internal soft tube will be placed between the kidney and the bladder while in surgery (after the stone is cleared). The stent will keep the kidney draining.    What should I expect?     It is common for a stent to cause some irritation and discomfort.   You may have:      The need to urinate suddenly     The need to urinate often     Pain during urination     A dull backache, which may get worse during urination     Blood stained urine (like fruit punch) and occasional small clots    It s important to remember the stent is necessary and only temporary. To feel more comfortable:      Drink more than you normally would but you do not have to constantly  flush your kidneys     Limiting your activity may decrease irritation or bleeding    Ibuprofen - 2 tablets every 6-8 hours     Use pain medications as directed.    When is the stent removed?    Most stents are removed within 5 days to 2 weeks after a procedure.     How is the stent removed?     Your stent will be removed in the [facility] with a small telescope and a grasping tool.  It usually takes less than 1 minute to remove the stent.    What should I expect after the stent is removed?     You should feel normal by the next day    Some patients find:    An increase in back pain about an hour after the stent is removed as the kidney fills up with urine before it starts to empty.  It can be as uncomfortable as your initial stone episode.  Taking pain medications before stent removal may be helpful, but you would need someone else to drive you to and from your  appointment.    Bladder symptoms usually disappear by the next morning.    Small amounts of blood in the urine may be seen occasionally for up to a week.    Diet:      After surgery, there are no dietary restrictions - Drink to thirst, there is no need to increase intake of fluids, as this may increase nausea symptoms. Try to eat smaller, more frequent snacks, instead of large meals.    Activity:    Many people return to work within 1-2 days. Fatigue is normal for a couple of weeks following surgery. With increased activity you may experience more discomfort and you may notice more blood in your urine.      Post-Operative Symptom Control    While you recover from your procedure, you can take steps to ease your recovery.    Medications that prevent further episodes of severe pain and help stones pass: Take these as prescribed on a regular basis even if you are NOT in pain      Ibuprofen (Advil or Motrin) - Is available over the counter Take 2 (200mg) tablets every 6 hours until the stone passes.  o prevents spasm of the ureter.    o Decreases pain      Dramamine - (drowsy version, non-generic formulation) Is available over the counter and decreases spasm of the ureter.  Take 50mg at bedtime every night until the stone passes. In addition, take every 6 hours as needed.  Dramamine:  o Decreases nausea  o Decreases acute pain  o Decreases recurrence of pain for next 24 hours  o Will help you sleep        *This medication will cause increased drowsiness, do not drive or operate machinery for 6 hours      Flomax- Studies show that Flomax decreases irritation from stents.   o Take every day with food until stone passes even if you do not have pain  o Flomax does not relieve pain.        *This medication may cause nasal congestion or light-headedness      Detrol ( Tolterodine) - After surgery Detrol may decrease stent irritation and pelvic pain  o Take as prescribed     *This medication may cause dry mouth, constipation or  blurry vision. Stop medication if unable to urinate.    Medication that are taken as needed to manage break through symptoms: Take these ONLY as required and hopefully not at all      Narcotics (Percocet, Vicodin, Dilaudid)- take as prescribed for severe pain unrelieved by ibuprofen and dramamine  o Take as prescribed for severe pain  o Narcotics have significant side effects and only  cover-up  pain. They have no effect on cause of pain.  o Common side effects:  - Confusion, disorientation and sedation - DO NOT DRIVE OR OPERATE MACHINERY WITHIN 24 HOURS  - Nausea - take Dramamine or Zofran  or Haldol to help control  - Constipation  - Sleep disturbances      Ondansetron (Zofran)-  o Take as prescribed  o Reserve for severe nausea  o May cause constipation, start over the counter Miralax if needed to treat this    Haldol-  o Take as prescribed  o Reserve for severe nausea    Warning Signs/Symptoms - Please call the [facility] 24 hours a day at [phone] IMMEDIATELY if you experience any of these:    Fever greater than 100.1     Chills    Pain NOT CONTROLLED by pain medications    Heavy bleeding or large clots in urine (small clots can be normal)    Persistent nausea and/or vomiting    Post-Operative Follow up:    The stone(s) will be sent from surgery to a lab for composition analysis.  These results are usually available before a one month post-operative visit.  If you had laser treatment to break up your stone, you will usually be scheduled for a low dose CT scan prior to your one month appointment.  This scan allows your surgeon to confirm that all stone fragments were cleared at time of surgery and that there have been no complications.  These results along with possible labs and urine studies will help us develop an individualized plan to prevent new stones from forming and keep existing stones from enlarging.  This visit is usually scheduled about 1 month after your original surgery.    The  [facility] can respond to your questions or concerns 24 hours a day at [phone].

## 2021-11-12 ENCOUNTER — PREP FOR PROCEDURE (OUTPATIENT)
Dept: UROLOGY | Facility: CLINIC | Age: 41
End: 2021-11-12
Payer: COMMERCIAL

## 2021-11-12 DIAGNOSIS — Z11.59 ENCOUNTER FOR SCREENING FOR OTHER VIRAL DISEASES: ICD-10-CM

## 2021-11-12 DIAGNOSIS — N20.1 CALCULUS OF URETER: Primary | ICD-10-CM

## 2021-11-12 RX ORDER — CEFAZOLIN SODIUM IN 0.9 % NACL 3 G/100 ML
3 INTRAVENOUS SOLUTION, PIGGYBACK (ML) INTRAVENOUS
Status: CANCELLED | OUTPATIENT
Start: 2021-11-22

## 2021-11-12 RX ORDER — GABAPENTIN 100 MG/1
300 CAPSULE ORAL
Status: CANCELLED | OUTPATIENT
Start: 2021-11-12

## 2021-11-12 RX ORDER — KETOROLAC TROMETHAMINE 15 MG/ML
15 INJECTION, SOLUTION INTRAMUSCULAR; INTRAVENOUS
Status: CANCELLED | OUTPATIENT
Start: 2021-11-22

## 2021-11-12 RX ORDER — ACETAMINOPHEN 325 MG/1
975 TABLET ORAL
Status: CANCELLED | OUTPATIENT
Start: 2021-11-22

## 2021-11-18 NOTE — PATIENT INSTRUCTIONS
Important Takeaway Points From This Visit:    We will call with your lab results from today.    Restart your Ozempic at 0.25 mg weekly for 1 month, then increase back to 0.5 mg weekly injections to avoid side effects.    Get an annual eye exam.    Your medications you requested have been refilled.    Do not take ibuprofen, aleve, or aspirin prior to your surgery.    Tylenol is ok to take.    Your A1c for your diabetes needs to be below 8 for me to approve the surgery as scheduled.    You got a tetanus shot booster today    Hold your metformin the day before surgery.      As always, please call with any questions or concerns. I look forward to seeing you again soon!    Take care,  Dr. Rich    Your current medication list is printed. Please keep this with you - it is helpful to bring this current list to any other medical appointments. It can also be helpful if you ever go to the emergency room or hospital.    If you had lab testing today we will call you with the results. The phone number we will call with your results is # [phone] (home) . If this is not the best number please call our clinic and change the number.    If you need any refills, please call your pharmacy and they will contact us.    If you have any further concerns or wish to schedule another appointment, please call our office at [phone].    If you have a medical emergency, please call [phone].    Thank you for coming to [facility]!      Preparing for Your Surgery  Getting started  A nurse will call you to review your health history and instructions. They will give you an arrival time based on your scheduled surgery time.  Please be ready to share the following:    Your doctor's clinic name and phone number    Your medical, surgical and anesthesia history    A list of allergies and sensitivities    A list of medicines, including herbal treatments and over-the-counter drugs    Whether the patient has a legal guardian  (ask how to send us the papers in advance)  If you have a child who's having surgery, please ask for a copy of Preparing for Your Child's Surgery.    Preparing for surgery    Within 30 days of surgery: Have a pre-op exam (sometimes called an H&P, or History and Physical). This can be done at a clinic or pre-operative center.  ? If you're having a , you may not need this exam. Talk to your care team    At your pre-op exam, talk to your care team about all medicines you take. If you need to stop any medicines before surgery, ask when to start taking them again.  ? We do this for your safety. Many medicines can make you bleed too much during surgery. Some change how well surgery (anesthesia) drugs work.    Call your insurance company to let them know you're having surgery. (If you don't have insurance, call [phone].)    Call your clinic if there's any change in your health. This includes signs of a cold or flu (sore throat, runny nose, cough, rash, fever). It also includes a scrape or scratch near the surgery site.    If you have questions on the day of surgery, call your hospital or surgery center.  Eating and drinking guidelines  For your safety: Unless your surgeon tells you otherwise, follow the guidelines below.    Eat and drink as usual until 8 hours before surgery. After that, no food or milk.    Drink clear liquids until 2 hours before surgery. These are liquids you can see through, like water, Gatorade and Propel Water. You may also have black coffee and tea (no cream or milk).    Nothing by mouth within 2 hours of surgery. This includes gum, candy and breath mints.    If you drink, stop drinking alcohol the night before surgery.    If your care team tells you to take medicine on the morning of surgery, it's okay to take it with a sip of water.  Preventing infection    Shower or bathe the night before and morning of your surgery. Follow the instructions your clinic gave you. (If no instructions,  use regular soap.)    Don't shave or clip hair near your surgery site. We'll remove the hair if needed.    Don't smoke or vape the morning of surgery. You may chew nicotine gum up to 2 hours before surgery. A nicotine patch is okay.  ? Note: Some surgeries require you to completely quit smoking and nicotine. Check with your surgeon.    Your care team will make every effort to keep you safe from infection. We will:  ? Clean our hands often with soap and water (or an alcohol-based hand rub).  ? Clean the skin at your surgery site with a special soap that kills germs.  ? Give you a special gown to keep you warm. (Cold raises the risk of infection.)  ? Wear special hair covers, masks, gowns and gloves during surgery.  ? Give antibiotic medicine, if prescribed. Not all surgeries need antibiotics.  What to bring on the day of surgery    Photo ID and insurance card    Copy of your health care directive, if you have one    Glasses and hearing aides (bring cases)  ? You can't wear contacts during surgery    Inhaler and eye drops, if you use them (tell us about these when you arrive)    CPAP machine or breathing device, if you use them    A few personal items, if spending the night    If you have . . .  ? A pacemaker or ICD (cardiac defibrillator): Bring the ID card.  ? An implanted stimulator: Bring the remote control.  ? A legal guardian: Bring a copy of the certified (court-stamped) guardianship papers.  Please remove any jewelry, including body piercings. Leave jewelry and other valuables at home.  If you're going home the day of surgery  Important: If you don't follow the rules below, we must cancel your surgery.     Arrange for someone to drive you home after surgery. You may not drive, take a taxi or take public transportation by yourself (unless you'll have local anesthesia only).    Arrange for a responsible adult to stay with you overnight. If you don't, we may keep you in the hospital overnight, and you may need to  pay the costs yourself.  Questions?   If you have any questions for your care team, list them here: _________________________________________________________________________________________________________________________________________________________________________________________________________________________________________________________________________________________________________________________  For informational purposes only. Not to replace the advice of your health care provider. Copyright   2003, 2019 [facility]. All rights reserved. Clinically reviewed by Karol Burnett MD. [employer]ART[employer] [id number] - REV 4/20.

## 2021-11-18 NOTE — PROGRESS NOTES
[facility]  [address], [address]  [place] [address]  Phone: [phone]  Fax: [phone]  Primary Provider: Belkis Chadwick  Pre-op Performing Provider: FORREST HYLTON    PREOPERATIVE EVALUATION:  Today's date: 11/19/2021    Josué Franco is a 41 year old adult who presents for a preoperative evaluation.    Surgical Information:  Surgery/Procedure: CYSTOURETEROSCOPY, WITH LITHOTRIPSY USING NILESH 120P LASER AND URETERAL STENT INSERTION  Surgery Location: [facility]  Surgeon: Dr. Bertrand Roy  Surgery Date: 11/22/2021  Time of Surgery: 7:30 AM  Where patient plans to recover: At home with family  Fax number for surgical facility: Note does not need to be faxed, will be available electronically in Epic.    Type of Anesthesia Anticipated: to be determined    Assessment & Plan     The proposed surgical procedure is considered INTERMEDIATE risk.    Assessment & Plan   1. Preop general physical exam: Patient with hyperglycemia of 217 and A1c checked today of 9.  This is above preoperative goal.  Given patient's symptoms I did discuss with patient and surgeon we will work on improving glycemic control leading up to his surgery on Monday, 11/22/2021 and close follow-up for postoperative control.  Will start glipizide extended release 5 mg and patient is to check blood sugars twice daily and bring these in the clinic next week for follow-up.  Patient may undergo procedure as planned at this time.  Other chronic conditions controlled.  Up-to-date on tetanus.  - HEMOGLOBIN A1C; Future  - BASIC METABOLIC PANEL; Future  - EKG 12-lead complete w/read - Clinics    2. Type 2 diabetes mellitus with other circulatory complication, without long-term current use of insulin (H): Encouraged eye exam. Off Ozempic because he ran out and has not come in for appointment. A1c today of 9. Restart Ozempic after procedure. Start at 0.25 mg weekly for 1 month then increase to 0.5 mg after  that. Foot exam done today. Other routine labs. EKG normal.  Because of A1c elevation of 9 and needing short-term glycemic control will be adding glipizide 5 mg daily extended release.  He will follow-up early next week Tuesday or Wednesday in clinic to review documented blood sugars and make additional adjustments for postoperative healing.  - Albumin Random Urine Quantitative with Creat Ratio; Future  - HEMOGLOBIN A1C; Future  - BASIC METABOLIC PANEL; Future  - Lipid panel reflex to direct LDL Fasting; Future  - semaglutide (OZEMPIC, 0.25 OR 0.5 MG/DOSE,) 2 MG/1.5ML SOPN pen; Inject 0.5 mg Subcutaneous every 7 days  Dispense: 1.52 mL; Refill: 0  - EKG 12-lead complete w/read - Clinics  - FOOT EXAM    3. Hyperlipidemia LDL goal <100: Due for labs.  - Lipid panel reflex to direct LDL Fasting; Future    4. KRISTINA (generalized anxiety disorder): Continue Prozac. Reviewed PDMP. Refilled PRN ativan. Last filled in February 2021. Recommend not to mix with his PRN percocet given for current nephrolithiasis.  - LORazepam (ATIVAN) 1 MG tablet; Take 1 tablet (1 mg) by mouth daily as needed for anxiety  Dispense: 20 tablet; Refill: 0    5. Mild intermittent asthma with exacerbation: Controlled. ACT of 24 today. AAP given. No changes.    6. Essential hypertension: Blood pressure currently controlled.  Continue perioperative beta-blocker metoprolol.    7. BMI over 45: Encourage weight loss.    8. ADRIANNA (obstructive sleep apnea)- severe (AHI 82): Bring CPAP with day of surgery.    9. Need for diphtheria-tetanus-pertussis (Tdap) vaccine  - TDAP VACCINE (Adacel, Boostrix)  [[id number]]      Return in about 3 months (around 2/19/2022) for Diabetes Check.    Rosalio Rich MD  [facility]    This chart is completed utilizing dictation software; typos and/or incorrect word substitutions may unintentionally occur.       Risks and Recommendations:  The patient has the following additional risks and  recommendations for perioperative complications:   - Morbid obesity (BMI >40)  Diabetes:  - Patient is not on insulin therapy: regular NPO guidelines can be followed.   Obstructive Sleep Apnea:   -Will bring CPAP with the day of procedure.    Medication Instructions:   - Beta Blockers: Continue taking on the day of surgery.   - metformin: HOLD day of surgery.   - GLP-1 Injectable (exenitide, liraglutide, semaglutide, dulaglutide, etc.): HOLD day of surgery (planning to restart after surgery, patient currently out of Ozempic)   - ibuprofen (Advil, Motrin): HOLD 1 day before surgery.    - Benzodiazepines: Continue without modification.   - SSRIs, SNRIs, TCAs, Antipsychotics: Continue without modification.    - rescue Inhaler: Continue PRN. Bring to hospital on the day of surgery.    RECOMMENDATION:  APPROVAL GIVEN to proceed with proposed procedure, without further diagnostic evaluation.    Subjective     HPI related to upcoming procedure: History of kidney stone ~13 years ago. New stone ~ 1 month ago. Plan for removal and stent placement.      Preop Questions 11/19/2021   1. Have you ever had a heart attack or stroke? No   2. Have you ever had surgery on your heart or blood vessels, such as a stent placement, a coronary artery bypass, or surgery on an artery in your head, neck, heart, or legs? No   3. Do you have chest pain with activity? No   4. Do you have a history of  heart failure? No   5. Do you currently have a cold, bronchitis or symptoms of other infection? No   6. Do you have a cough, shortness of breath, or wheezing? No   7. Do you or anyone in your family have previous history of blood clots? No   8. Do you or does anyone in your family have a serious bleeding problem such as prolonged bleeding following surgeries or cuts? No   9. Have you ever had problems with anemia or been told to take iron pills? No   10. Have you had any abnormal blood loss such as black, tarry or bloody stools, or abnormal vaginal  bleeding? No   10. Have you had any abnormal blood loss such as black, tarry or bloody stools? No   11. Have you ever had a blood transfusion? No   12. Are you willing to have a blood transfusion if it is medically needed before, during, or after your surgery? Yes   13. Have you or any of your relatives ever had problems with anesthesia? YES - Patient gets nausea from anesthesia.   14. Do you have sleep apnea, excessive snoring or daytime drowsiness? YES - Sleep apnea   14a. Do you have a CPAP machine? Yes   15. Do you have any artifical heart valves or other implanted medical devices like a pacemaker, defibrillator, or continuous glucose monitor? No   16. Do you have artificial joints? No   17. Are you allergic to latex? No   18. Is there any chance that you may be pregnant? No     Health Care Directive:  Patient does not have a Health Care Directive or Living Will: Tasha Franco (Wife) would be medical decision maker. Will talk to her about health directive. Can be reached at [phone].    Preoperative Review of :   reviewed - controlled substances reflected in medication list.    Status of Chronic Conditions:  ASTHMA - Patient has a longstanding history of moderate-severe Asthma . Patient has been doing well overall noting NO SYMPTOMS and continues on medication regimen consisting of as needed albuterol rescue inhaler without adverse reactions or side effects.     ANXIETY- Patient has a long history of Anxiety of moderate severity requiring medication for control with Current symptom control.     DIABETES - Patient has a longstanding history of DiabetesType Type II . Patient is being treated with oral agents and Ozempic (patient off ozempic because of being out of the medication) and denies significant side effects. Control has been fair. Complicating factors include but are not limited to: hypertension, hyperlipidemia and morbid obesity. A1`c today listed below.    HYPERLIPIDEMIA - Patient has a long  history of significant Hyperlipidemia requiring medication for treatment with recent good control. Patient reports no problems or side effects with the medication.     HYPERTENSION - Patient has longstanding history of HTN , currently denies any symptoms referable to elevated blood pressure. Specifically denies chest pain, palpitations, dyspnea, orthopnea, PND or peripheral edema. Blood pressure readings have been in normal range. Current medication regimen is as listed below. Patient denies any side effects of medication.     SLEEP PROBLEM - Patient has a longstanding history of sleep apnea for which he uses a CPAP device.      Review of Systems  Constitutional, neuro, ENT, endocrine, pulmonary, cardiac, gastrointestinal, genitourinary, musculoskeletal, integument and psychiatric systems are negative, except as otherwise noted.    Patient Active Problem List    Diagnosis Date Noted     Hyperlipidemia LDL goal <100 05/06/2019     Priority: Medium     Non compliance w medication regimen 03/14/2019     Priority: Medium     Type 2 diabetes mellitus with other circulatory complication, without long-term current use of insulin (H) 01/04/2018     Priority: Medium     ADRIANNA (obstructive sleep apnea)- severe (AHI 82) 06/30/2017     Priority: Medium     Sleep study 10/2006 Tiffany (313#)- AHI 82, low )2 83%,  CPAP 11-12 cm effective       Essential hypertension 05/25/2017     Priority: Medium     BMI over 45 07/22/2016     Priority: Medium     KRISTINA (generalized anxiety disorder) 06/03/2013     Priority: Medium     Oligospermia 03/19/2012     Priority: Medium     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     Priority: Medium     CARDIOVASCULAR SCREENING; LDL GOAL LESS THAN 160 10/31/2010     Priority: Medium     INTERMITTENT ASTHMA  04/16/2010     Priority: Medium      Past Medical History:   Diagnosis Date     Asthma, persistent, severity to be determined      Diabetes mellitus type II, controlled (H)     with other complications      HTN (hypertension)      Morbid obesity (H)      Nephrolithiasis      ADRIANNA on CPAP      Past Surgical History:   Procedure Laterality Date     CHOLECYSTECTOMY  1997     LITHOTRIPSY  7/08    Lithotrypsy     NISSEN FUNDOPLICATION  2001    Nissen Fundiplication     Current Outpatient Medications   Medication Sig Dispense Refill     acetaminophen (TYLENOL) 500 MG tablet Take 500-1,000 mg by mouth every 6 hours as needed for mild pain       albuterol (PROAIR HFA/PROVENTIL HFA/VENTOLIN HFA) 108 (90 Base) MCG/ACT inhaler Inhale 2 puffs into the lungs every 4 hours as needed for shortness of breath / dyspnea 1 Inhaler 5     albuterol (PROVENTIL) (2.5 MG/3ML) 0.083% neb solution INHALE CONTENTS OF 1 VIAL VIA NEBULIZER EVERY 4 HOURS AS NEEDED FOR WHEEZING. 1 Box 1     blood glucose (ACCU-CHEK GUIDE) test strip Use to test blood sugar one to two times daily or as directed. 100 strip 11     blood glucose monitoring (ACCU-CHEK FASTCLIX) lancets Use to test blood sugar 1-2 times daily or as directed. 100 each 11     FLUoxetine (PROZAC) 20 MG capsule TAKE 3 CAPSULES BY MOUTH EVERY  capsule 1     ibuprofen (ADVIL/MOTRIN) 200 MG tablet Take 200 mg by mouth every 4 hours as needed for mild pain       LORazepam (ATIVAN) 1 MG tablet Take 1 tablet (1 mg) by mouth daily as needed for anxiety 20 tablet 0     metFORMIN (GLUCOPHAGE-XR) 500 MG 24 hr tablet TAKE 2 TABLETS (1,000 MG) BY MOUTH ONCE DAILY (WITH DINNER) 60 tablet 0     metoprolol succinate ER (TOPROL-XL) 50 MG 24 hr tablet TAKE 1 TABLET BY MOUTH EVERY DAY 90 tablet 3     ondansetron (ZOFRAN-ODT) 4 MG ODT tab Take 4 mg by mouth       order for DME CPAP 11 cm 1 Units 1     oxyCODONE-acetaminophen (PERCOCET) 5-325 MG tablet Take 1-2 tablets by mouth       semaglutide (OZEMPIC, 0.25 OR 0.5 MG/DOSE,) 2 MG/1.5ML SOPN pen Inject 0.5 mg Subcutaneous every 7 days 1.52 mL 0     ASPIRIN NOT PRESCRIBED (INTENTIONAL) Antiplatelet medication not prescribed intentionally due to  (Patient  "not taking: Reported on 11/19/2021)         Allergies   Allergen Reactions     No Known Allergies         Social History     Tobacco Use     Smoking status: Never Smoker     Smokeless tobacco: Never Used   Substance Use Topics     Alcohol use: No     Comment: Very moderate 0-1/week     Family History   Problem Relation Age of Onset     Coronary Artery Disease Father      Diabetes No family hx of      Hypertension No family hx of      Colon Cancer No family hx of      History   Drug Use No         Objective     BP (!) 134/90   Pulse 71   Temp 97.7  F (36.5  C) (Temporal)   Resp 14   Ht 1.88 m (6' 2.02\")   Wt (!) 161 kg (355 lb)   SpO2 96%   BMI 45.56 kg/m      Physical Exam    GENERAL APPEARANCE: Obese male. Alert and no distress     EYES: EOMI,  PERRL     HENT: ear canals and TM's normal and nose and mouth without ulcers or lesions     NECK: no adenopathy, no asymmetry, masses, or scars and thyroid normal to palpation     RESP: lungs clear to auscultation - no rales, rhonchi or wheezes     CV: regular rates and rhythm, normal S1 S2, no S3 or S4 and no murmur, click or rub     ABDOMEN:  soft, nontender, no HSM or masses and bowel sounds normal     MS: extremities normal- no gross deformities noted, no evidence of inflammation in joints, FROM in all extremities.     SKIN: no suspicious lesions or rashes     NEURO: Normal strength and tone, sensory exam grossly normal, mentation intact and speech normal     PSYCH: mentation appears normal. and affect normal/bright     LYMPHATICS: No cervical adenopathy    Diabetic Foot Screen:  Any complaints of increased pain or numbness ? No    Foot Exam:   - normal DP and PT pulses, no trophic changes or ulcerative lesions and normal sensory exam  - Is there a foot ulcer now or a history of foot ulcer? No  - Does the foot have an abnormal shape? No  - Are the nails thick, too long or ingrown? No  - Are there any redness or open areas? No    Sensation Testing done at all " points on the diagram with monofilament     Right Foot: Sensation Normal at all points  Left Foot: Sensation Normal at all points     Risk Category: 0- No loss of protective sensation    Performed by Rosalio Rich MD    Recent Labs   Lab Test 12/04/20  1034 01/24/20  0929    137   POTASSIUM 4.2 3.9   CR 0.81 0.72   A1C 7.1* 7.4*        Diagnostics:  No results found for this or any previous visit (from the past 24 hour(s)).   EKG shows normal sinus rhythm with rate approximately 65.  V1 pattern consistent with right bundle branch block.  No ST segment changes concerning for acute ischemia.  No prior for comparison.    Revised Cardiac Risk Index (RCRI):  The patient has the following serious cardiovascular risks for perioperative complications:   - No serious cardiac risks = 0 points     RCRI Interpretation: 0 points: Class I (very low risk - 0.4% complication rate)         Signed Electronically by: Rosalio Rich MD  Copy of this evaluation report is provided to requesting physician.

## 2021-11-18 NOTE — H&P (VIEW-ONLY)
[facility]  [address], [address]  [place] [address]  Phone: [phone]  Fax: [phone]  Primary Provider: Belkis Chadwick  Pre-op Performing Provider: FORREST HYLTON    PREOPERATIVE EVALUATION:  Today's date: 11/19/2021    Josué Franco is a 41 year old adult who presents for a preoperative evaluation.    Surgical Information:  Surgery/Procedure: CYSTOURETEROSCOPY, WITH LITHOTRIPSY USING NILESH 120P LASER AND URETERAL STENT INSERTION  Surgery Location: [facility]  Surgeon: Dr. Bertrand Roy  Surgery Date: 11/22/2021  Time of Surgery: 7:30 AM  Where patient plans to recover: At home with family  Fax number for surgical facility: Note does not need to be faxed, will be available electronically in Epic.    Type of Anesthesia Anticipated: to be determined    Assessment & Plan     The proposed surgical procedure is considered INTERMEDIATE risk.    Assessment & Plan   1. Preop general physical exam: Patient with hyperglycemia of 217 and A1c checked today of 9.  This is above preoperative goal.  Given patient's symptoms I did discuss with patient and surgeon we will work on improving glycemic control leading up to his surgery on Monday, 11/22/2021 and close follow-up for postoperative control.  Will start glipizide extended release 5 mg and patient is to check blood sugars twice daily and bring these in the clinic next week for follow-up.  Patient may undergo procedure as planned at this time.  Other chronic conditions controlled.  Up-to-date on tetanus.  - HEMOGLOBIN A1C; Future  - BASIC METABOLIC PANEL; Future  - EKG 12-lead complete w/read - Clinics    2. Type 2 diabetes mellitus with other circulatory complication, without long-term current use of insulin (H): Encouraged eye exam. Off Ozempic because he ran out and has not come in for appointment. A1c today of 9. Restart Ozempic after procedure. Start at 0.25 mg weekly for 1 month then increase to 0.5 mg after  that. Foot exam done today. Other routine labs. EKG normal.  Because of A1c elevation of 9 and needing short-term glycemic control will be adding glipizide 5 mg daily extended release.  He will follow-up early next week Tuesday or Wednesday in clinic to review documented blood sugars and make additional adjustments for postoperative healing.  - Albumin Random Urine Quantitative with Creat Ratio; Future  - HEMOGLOBIN A1C; Future  - BASIC METABOLIC PANEL; Future  - Lipid panel reflex to direct LDL Fasting; Future  - semaglutide (OZEMPIC, 0.25 OR 0.5 MG/DOSE,) 2 MG/1.5ML SOPN pen; Inject 0.5 mg Subcutaneous every 7 days  Dispense: 1.52 mL; Refill: 0  - EKG 12-lead complete w/read - Clinics  - FOOT EXAM    3. Hyperlipidemia LDL goal <100: Due for labs.  - Lipid panel reflex to direct LDL Fasting; Future    4. KRISTINA (generalized anxiety disorder): Continue Prozac. Reviewed PDMP. Refilled PRN ativan. Last filled in February 2021. Recommend not to mix with his PRN percocet given for current nephrolithiasis.  - LORazepam (ATIVAN) 1 MG tablet; Take 1 tablet (1 mg) by mouth daily as needed for anxiety  Dispense: 20 tablet; Refill: 0    5. Mild intermittent asthma with exacerbation: Controlled. ACT of 24 today. AAP given. No changes.    6. Essential hypertension: Blood pressure currently controlled.  Continue perioperative beta-blocker metoprolol.    7. BMI over 45: Encourage weight loss.    8. ADRIANNA (obstructive sleep apnea)- severe (AHI 82): Bring CPAP with day of surgery.    9. Need for diphtheria-tetanus-pertussis (Tdap) vaccine  - TDAP VACCINE (Adacel, Boostrix)  [[id number]]      Return in about 3 months (around 2/19/2022) for Diabetes Check.    Rosalio Rich MD  [facility]    This chart is completed utilizing dictation software; typos and/or incorrect word substitutions may unintentionally occur.       Risks and Recommendations:  The patient has the following additional risks and  recommendations for perioperative complications:   - Morbid obesity (BMI >40)  Diabetes:  - Patient is not on insulin therapy: regular NPO guidelines can be followed.   Obstructive Sleep Apnea:   -Will bring CPAP with the day of procedure.    Medication Instructions:   - Beta Blockers: Continue taking on the day of surgery.   - metformin: HOLD day of surgery.   - GLP-1 Injectable (exenitide, liraglutide, semaglutide, dulaglutide, etc.): HOLD day of surgery (planning to restart after surgery, patient currently out of Ozempic)   - ibuprofen (Advil, Motrin): HOLD 1 day before surgery.    - Benzodiazepines: Continue without modification.   - SSRIs, SNRIs, TCAs, Antipsychotics: Continue without modification.    - rescue Inhaler: Continue PRN. Bring to hospital on the day of surgery.    RECOMMENDATION:  APPROVAL GIVEN to proceed with proposed procedure, without further diagnostic evaluation.    Subjective     HPI related to upcoming procedure: History of kidney stone ~13 years ago. New stone ~ 1 month ago. Plan for removal and stent placement.      Preop Questions 11/19/2021   1. Have you ever had a heart attack or stroke? No   2. Have you ever had surgery on your heart or blood vessels, such as a stent placement, a coronary artery bypass, or surgery on an artery in your head, neck, heart, or legs? No   3. Do you have chest pain with activity? No   4. Do you have a history of  heart failure? No   5. Do you currently have a cold, bronchitis or symptoms of other infection? No   6. Do you have a cough, shortness of breath, or wheezing? No   7. Do you or anyone in your family have previous history of blood clots? No   8. Do you or does anyone in your family have a serious bleeding problem such as prolonged bleeding following surgeries or cuts? No   9. Have you ever had problems with anemia or been told to take iron pills? No   10. Have you had any abnormal blood loss such as black, tarry or bloody stools, or abnormal vaginal  bleeding? No   10. Have you had any abnormal blood loss such as black, tarry or bloody stools? No   11. Have you ever had a blood transfusion? No   12. Are you willing to have a blood transfusion if it is medically needed before, during, or after your surgery? Yes   13. Have you or any of your relatives ever had problems with anesthesia? YES - Patient gets nausea from anesthesia.   14. Do you have sleep apnea, excessive snoring or daytime drowsiness? YES - Sleep apnea   14a. Do you have a CPAP machine? Yes   15. Do you have any artifical heart valves or other implanted medical devices like a pacemaker, defibrillator, or continuous glucose monitor? No   16. Do you have artificial joints? No   17. Are you allergic to latex? No   18. Is there any chance that you may be pregnant? No     Health Care Directive:  Patient does not have a Health Care Directive or Living Will: Tasha Franco (Wife) would be medical decision maker. Will talk to her about health directive. Can be reached at [phone].    Preoperative Review of :   reviewed - controlled substances reflected in medication list.    Status of Chronic Conditions:  ASTHMA - Patient has a longstanding history of moderate-severe Asthma . Patient has been doing well overall noting NO SYMPTOMS and continues on medication regimen consisting of as needed albuterol rescue inhaler without adverse reactions or side effects.     ANXIETY- Patient has a long history of Anxiety of moderate severity requiring medication for control with Current symptom control.     DIABETES - Patient has a longstanding history of DiabetesType Type II . Patient is being treated with oral agents and Ozempic (patient off ozempic because of being out of the medication) and denies significant side effects. Control has been fair. Complicating factors include but are not limited to: hypertension, hyperlipidemia and morbid obesity. A1`c today listed below.    HYPERLIPIDEMIA - Patient has a long  history of significant Hyperlipidemia requiring medication for treatment with recent good control. Patient reports no problems or side effects with the medication.     HYPERTENSION - Patient has longstanding history of HTN , currently denies any symptoms referable to elevated blood pressure. Specifically denies chest pain, palpitations, dyspnea, orthopnea, PND or peripheral edema. Blood pressure readings have been in normal range. Current medication regimen is as listed below. Patient denies any side effects of medication.     SLEEP PROBLEM - Patient has a longstanding history of sleep apnea for which he uses a CPAP device.      Review of Systems  Constitutional, neuro, ENT, endocrine, pulmonary, cardiac, gastrointestinal, genitourinary, musculoskeletal, integument and psychiatric systems are negative, except as otherwise noted.    Patient Active Problem List    Diagnosis Date Noted     Hyperlipidemia LDL goal <100 05/06/2019     Priority: Medium     Non compliance w medication regimen 03/14/2019     Priority: Medium     Type 2 diabetes mellitus with other circulatory complication, without long-term current use of insulin (H) 01/04/2018     Priority: Medium     ADRIANNA (obstructive sleep apnea)- severe (AHI 82) 06/30/2017     Priority: Medium     Sleep study 10/2006 Tiffany (313#)- AHI 82, low )2 83%,  CPAP 11-12 cm effective       Essential hypertension 05/25/2017     Priority: Medium     BMI over 45 07/22/2016     Priority: Medium     KRISTINA (generalized anxiety disorder) 06/03/2013     Priority: Medium     Oligospermia 03/19/2012     Priority: Medium     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     Priority: Medium     CARDIOVASCULAR SCREENING; LDL GOAL LESS THAN 160 10/31/2010     Priority: Medium     INTERMITTENT ASTHMA  04/16/2010     Priority: Medium      Past Medical History:   Diagnosis Date     Asthma, persistent, severity to be determined      Diabetes mellitus type II, controlled (H)     with other complications      HTN (hypertension)      Morbid obesity (H)      Nephrolithiasis      ADRIANNA on CPAP      Past Surgical History:   Procedure Laterality Date     CHOLECYSTECTOMY  1997     LITHOTRIPSY  7/08    Lithotrypsy     NISSEN FUNDOPLICATION  2001    Nissen Fundiplication     Current Outpatient Medications   Medication Sig Dispense Refill     acetaminophen (TYLENOL) 500 MG tablet Take 500-1,000 mg by mouth every 6 hours as needed for mild pain       albuterol (PROAIR HFA/PROVENTIL HFA/VENTOLIN HFA) 108 (90 Base) MCG/ACT inhaler Inhale 2 puffs into the lungs every 4 hours as needed for shortness of breath / dyspnea 1 Inhaler 5     albuterol (PROVENTIL) (2.5 MG/3ML) 0.083% neb solution INHALE CONTENTS OF 1 VIAL VIA NEBULIZER EVERY 4 HOURS AS NEEDED FOR WHEEZING. 1 Box 1     blood glucose (ACCU-CHEK GUIDE) test strip Use to test blood sugar one to two times daily or as directed. 100 strip 11     blood glucose monitoring (ACCU-CHEK FASTCLIX) lancets Use to test blood sugar 1-2 times daily or as directed. 100 each 11     FLUoxetine (PROZAC) 20 MG capsule TAKE 3 CAPSULES BY MOUTH EVERY  capsule 1     ibuprofen (ADVIL/MOTRIN) 200 MG tablet Take 200 mg by mouth every 4 hours as needed for mild pain       LORazepam (ATIVAN) 1 MG tablet Take 1 tablet (1 mg) by mouth daily as needed for anxiety 20 tablet 0     metFORMIN (GLUCOPHAGE-XR) 500 MG 24 hr tablet TAKE 2 TABLETS (1,000 MG) BY MOUTH ONCE DAILY (WITH DINNER) 60 tablet 0     metoprolol succinate ER (TOPROL-XL) 50 MG 24 hr tablet TAKE 1 TABLET BY MOUTH EVERY DAY 90 tablet 3     ondansetron (ZOFRAN-ODT) 4 MG ODT tab Take 4 mg by mouth       order for DME CPAP 11 cm 1 Units 1     oxyCODONE-acetaminophen (PERCOCET) 5-325 MG tablet Take 1-2 tablets by mouth       semaglutide (OZEMPIC, 0.25 OR 0.5 MG/DOSE,) 2 MG/1.5ML SOPN pen Inject 0.5 mg Subcutaneous every 7 days 1.52 mL 0     ASPIRIN NOT PRESCRIBED (INTENTIONAL) Antiplatelet medication not prescribed intentionally due to  (Patient  "not taking: Reported on 11/19/2021)         Allergies   Allergen Reactions     No Known Allergies         Social History     Tobacco Use     Smoking status: Never Smoker     Smokeless tobacco: Never Used   Substance Use Topics     Alcohol use: No     Comment: Very moderate 0-1/week     Family History   Problem Relation Age of Onset     Coronary Artery Disease Father      Diabetes No family hx of      Hypertension No family hx of      Colon Cancer No family hx of      History   Drug Use No         Objective     BP (!) 134/90   Pulse 71   Temp 97.7  F (36.5  C) (Temporal)   Resp 14   Ht 1.88 m (6' 2.02\")   Wt (!) 161 kg (355 lb)   SpO2 96%   BMI 45.56 kg/m      Physical Exam    GENERAL APPEARANCE: Obese male. Alert and no distress     EYES: EOMI,  PERRL     HENT: ear canals and TM's normal and nose and mouth without ulcers or lesions     NECK: no adenopathy, no asymmetry, masses, or scars and thyroid normal to palpation     RESP: lungs clear to auscultation - no rales, rhonchi or wheezes     CV: regular rates and rhythm, normal S1 S2, no S3 or S4 and no murmur, click or rub     ABDOMEN:  soft, nontender, no HSM or masses and bowel sounds normal     MS: extremities normal- no gross deformities noted, no evidence of inflammation in joints, FROM in all extremities.     SKIN: no suspicious lesions or rashes     NEURO: Normal strength and tone, sensory exam grossly normal, mentation intact and speech normal     PSYCH: mentation appears normal. and affect normal/bright     LYMPHATICS: No cervical adenopathy    Diabetic Foot Screen:  Any complaints of increased pain or numbness ? No    Foot Exam:   - normal DP and PT pulses, no trophic changes or ulcerative lesions and normal sensory exam  - Is there a foot ulcer now or a history of foot ulcer? No  - Does the foot have an abnormal shape? No  - Are the nails thick, too long or ingrown? No  - Are there any redness or open areas? No    Sensation Testing done at all " points on the diagram with monofilament     Right Foot: Sensation Normal at all points  Left Foot: Sensation Normal at all points     Risk Category: 0- No loss of protective sensation    Performed by Rosalio Rich MD    Recent Labs   Lab Test 12/04/20  1034 01/24/20  0929    137   POTASSIUM 4.2 3.9   CR 0.81 0.72   A1C 7.1* 7.4*        Diagnostics:  No results found for this or any previous visit (from the past 24 hour(s)).   EKG shows normal sinus rhythm with rate approximately 65.  V1 pattern consistent with right bundle branch block.  No ST segment changes concerning for acute ischemia.  No prior for comparison.    Revised Cardiac Risk Index (RCRI):  The patient has the following serious cardiovascular risks for perioperative complications:   - No serious cardiac risks = 0 points     RCRI Interpretation: 0 points: Class I (very low risk - 0.4% complication rate)         Signed Electronically by: Rosalio Rich MD  Copy of this evaluation report is provided to requesting physician.

## 2021-11-19 ENCOUNTER — OFFICE VISIT (OUTPATIENT)
Dept: FAMILY MEDICINE | Facility: CLINIC | Age: 41
End: 2021-11-19
Payer: COMMERCIAL

## 2021-11-19 VITALS
WEIGHT: 315 LBS | BODY MASS INDEX: 40.43 KG/M2 | HEART RATE: 71 BPM | SYSTOLIC BLOOD PRESSURE: 132 MMHG | OXYGEN SATURATION: 96 % | HEIGHT: 74 IN | TEMPERATURE: 97.7 F | RESPIRATION RATE: 14 BRPM | DIASTOLIC BLOOD PRESSURE: 86 MMHG

## 2021-11-19 DIAGNOSIS — E66.01 MORBID OBESITY DUE TO EXCESS CALORIES (H): ICD-10-CM

## 2021-11-19 DIAGNOSIS — E78.5 HYPERLIPIDEMIA LDL GOAL <100: ICD-10-CM

## 2021-11-19 DIAGNOSIS — G47.33 OSA (OBSTRUCTIVE SLEEP APNEA): Chronic | ICD-10-CM

## 2021-11-19 DIAGNOSIS — J45.21 MILD INTERMITTENT ASTHMA WITH EXACERBATION: ICD-10-CM

## 2021-11-19 DIAGNOSIS — I10 ESSENTIAL HYPERTENSION: ICD-10-CM

## 2021-11-19 DIAGNOSIS — F41.1 GAD (GENERALIZED ANXIETY DISORDER): ICD-10-CM

## 2021-11-19 DIAGNOSIS — Z01.818 PREOP GENERAL PHYSICAL EXAM: Primary | ICD-10-CM

## 2021-11-19 DIAGNOSIS — E11.59 TYPE 2 DIABETES MELLITUS WITH OTHER CIRCULATORY COMPLICATION, WITHOUT LONG-TERM CURRENT USE OF INSULIN (H): ICD-10-CM

## 2021-11-19 DIAGNOSIS — Z23 NEED FOR DIPHTHERIA-TETANUS-PERTUSSIS (TDAP) VACCINE: ICD-10-CM

## 2021-11-19 DIAGNOSIS — Z11.59 ENCOUNTER FOR SCREENING FOR OTHER VIRAL DISEASES: ICD-10-CM

## 2021-11-19 LAB
ANION GAP SERPL CALCULATED.3IONS-SCNC: 2 MMOL/L (ref 3–14)
BUN SERPL-MCNC: 16 MG/DL (ref 7–30)
CALCIUM SERPL-MCNC: 9.1 MG/DL (ref 8.5–10.1)
CHLORIDE BLD-SCNC: 103 MMOL/L (ref 94–109)
CHOLEST SERPL-MCNC: 129 MG/DL
CO2 SERPL-SCNC: 31 MMOL/L (ref 20–32)
CREAT SERPL-MCNC: 0.87 MG/DL (ref 0.52–1.25)
CREAT UR-MCNC: 388 MG/DL
FASTING STATUS PATIENT QL REPORTED: YES
GFR SERPL CREATININE-BSD FRML MDRD: >90 ML/MIN/1.73M2
GLUCOSE BLD-MCNC: 217 MG/DL (ref 70–99)
HBA1C MFR BLD: 9 % (ref 0–5.6)
HDLC SERPL-MCNC: 41 MG/DL
LDLC SERPL CALC-MCNC: 62 MG/DL
MICROALBUMIN UR-MCNC: 78 MG/L
MICROALBUMIN/CREAT UR: 20.1 MG/G CR
NONHDLC SERPL-MCNC: 88 MG/DL
POTASSIUM BLD-SCNC: 4.3 MMOL/L (ref 3.4–5.3)
SARS-COV-2 RNA RESP QL NAA+PROBE: NEGATIVE
SODIUM SERPL-SCNC: 136 MMOL/L (ref 133–144)
TRIGL SERPL-MCNC: 130 MG/DL

## 2021-11-19 PROCEDURE — 83036 HEMOGLOBIN GLYCOSYLATED A1C: CPT | Performed by: FAMILY MEDICINE

## 2021-11-19 PROCEDURE — 82043 UR ALBUMIN QUANTITATIVE: CPT | Performed by: FAMILY MEDICINE

## 2021-11-19 PROCEDURE — 93000 ELECTROCARDIOGRAM COMPLETE: CPT | Performed by: FAMILY MEDICINE

## 2021-11-19 PROCEDURE — 99207 PR FOOT EXAM NO CHARGE: CPT | Performed by: FAMILY MEDICINE

## 2021-11-19 PROCEDURE — U0003 INFECTIOUS AGENT DETECTION BY NUCLEIC ACID (DNA OR RNA); SEVERE ACUTE RESPIRATORY SYNDROME CORONAVIRUS 2 (SARS-COV-2) (CORONAVIRUS DISEASE [COVID-19]), AMPLIFIED PROBE TECHNIQUE, MAKING USE OF HIGH THROUGHPUT TECHNOLOGIES AS DESCRIBED BY CMS-2020-01-R: HCPCS | Performed by: FAMILY MEDICINE

## 2021-11-19 PROCEDURE — 80048 BASIC METABOLIC PNL TOTAL CA: CPT | Performed by: FAMILY MEDICINE

## 2021-11-19 PROCEDURE — 99214 OFFICE O/P EST MOD 30 MIN: CPT | Mod: 25 | Performed by: FAMILY MEDICINE

## 2021-11-19 PROCEDURE — 90471 IMMUNIZATION ADMIN: CPT | Performed by: FAMILY MEDICINE

## 2021-11-19 PROCEDURE — U0005 INFEC AGEN DETEC AMPLI PROBE: HCPCS | Performed by: FAMILY MEDICINE

## 2021-11-19 PROCEDURE — 36415 COLL VENOUS BLD VENIPUNCTURE: CPT | Performed by: FAMILY MEDICINE

## 2021-11-19 PROCEDURE — 90715 TDAP VACCINE 7 YRS/> IM: CPT | Performed by: FAMILY MEDICINE

## 2021-11-19 PROCEDURE — 80061 LIPID PANEL: CPT | Performed by: FAMILY MEDICINE

## 2021-11-19 RX ORDER — LORAZEPAM 1 MG/1
1 TABLET ORAL DAILY PRN
Qty: 20 TABLET | Refills: 0 | Status: SHIPPED | OUTPATIENT
Start: 2021-11-19 | End: 2022-11-18

## 2021-11-19 RX ORDER — GLIPIZIDE 5 MG/1
5 TABLET, FILM COATED, EXTENDED RELEASE ORAL DAILY
Qty: 30 TABLET | Refills: 0 | Status: SHIPPED | OUTPATIENT
Start: 2021-11-19 | End: 2021-12-17

## 2021-11-19 RX ORDER — ONDANSETRON 4 MG/1
4 TABLET, ORALLY DISINTEGRATING ORAL EVERY 6 HOURS PRN
COMMUNITY
Start: 2021-10-26 | End: 2022-11-18

## 2021-11-19 RX ORDER — SEMAGLUTIDE 1.34 MG/ML
0.5 INJECTION, SOLUTION SUBCUTANEOUS
Qty: 1.52 ML | Refills: 0 | Status: SHIPPED | OUTPATIENT
Start: 2021-11-19 | End: 2022-01-11 | Stop reason: SINTOL

## 2021-11-19 ASSESSMENT — MIFFLIN-ST. JEOR: SCORE: 2590.27

## 2021-11-19 NOTE — NURSING NOTE
Prior to immunization administration, verified patients identity using patient s name and date of birth. Please see Immunization Activity for additional information.     Screening Questionnaire for Adult Immunization    Are you sick today?   No   Do you have allergies to medications, food, a vaccine component or latex?   No   Have you ever had a serious reaction after receiving a vaccination?   No   Do you have a long-term health problem with heart, lung, kidney, or metabolic disease (e.g., diabetes), asthma, a blood disorder, no spleen, complement component deficiency, a cochlear implant, or a spinal fluid leak?  Are you on long-term aspirin therapy?   Yes   Do you have cancer, leukemia, HIV/AIDS, or any other immune system problem?   No   Do you have a parent, brother, or sister with an immune system problem?   No   In the past 3 months, have you taken medications that affect  your immune system, such as prednisone, other steroids, or anticancer drugs; drugs for the treatment of rheumatoid arthritis, Crohn s disease, or psoriasis; or have you had radiation treatments?   No   Have you had a seizure, or a brain or other nervous system problem?   No   During the past year, have you received a transfusion of blood or blood    products, or been given immune (gamma) globulin or antiviral drug?   No   For women: Are you pregnant or is there a chance you could become       pregnant during the next month?   No   Have you received any vaccinations in the past 4 weeks?   No     Immunization questionnaire was positive for at least one answer.  Notified provider.        Per orders of Dr. Varghese , injection of tdap given by Deana Juarez CMA. Patient instructed to remain in clinic for 15 minutes afterwards, and to report any adverse reaction to me immediately.       Screening performed by Deana Juarez CMA on 11/19/2021 at 7:44 AM.

## 2021-11-19 NOTE — LETTER
My Asthma Action Plan    Name: Josué Franco   YOB: 1980  Date: 11/19/2021   My doctor: Rosalio Rich MD   My clinic: [facility]        My Rescue Medicine:   Albuterol inhaler (Proair/Ventolin/Proventil HFA)  2-4 puffs EVERY 4 HOURS as needed. Use a spacer if recommended by your provider.   My Asthma Severity:   Intermittent / Exercise Induced  Know your asthma triggers:   weather changes ..etc..          GREEN ZONE   Good Control    I feel good    No cough or wheeze    Can work, sleep and play without asthma symptoms       Take your asthma control medicine every day.     1. If exercise triggers your asthma, take your rescue medication    15 minutes before exercise or sports, and    During exercise if you have asthma symptoms  2. Spacer to use with inhaler: If you have a spacer, make sure to use it with your inhaler             YELLOW ZONE Getting Worse  I have ANY of these:    I do not feel good    Cough or wheeze    Chest feels tight    Wake up at night   1. Keep taking your Green Zone medications  2. Start taking your rescue medicine:    every 20 minutes for up to 1 hour. Then every 4 hours for 24-48 hours.  3. If you stay in the Yellow Zone for more than 12-24 hours, contact your doctor.  4. If you do not return to the Green Zone in 12-24 hours or you get worse, start taking your oral steroid medicine if prescribed by your provider.           RED ZONE Medical Alert - Get Help  I have ANY of these:    I feel awful    Medicine is not helping    Breathing getting harder    Trouble walking or talking    Nose opens wide to breathe       1. Take your rescue medicine NOW  2. If your provider has prescribed an oral steroid medicine, start taking it NOW  3. Call your doctor NOW  4. If you are still in the Red Zone after 20 minutes and you have not reached your doctor:    Take your rescue medicine again and    Call 911 or go to the emergency room right away    See your  regular doctor within 2 weeks of an Emergency Room or Urgent Care visit for follow-up treatment.          Annual Reminders:  Meet with Asthma Educator,  Flu Shot in the Fall, consider Pneumonia Vaccination for patients with asthma (aged 19 and older).    Pharmacy:    [facility] [place] - [address]  [facility] [address] [facility] [employer] - [place] - [address] [address] [place] [address]    Electronically signed by Rosalio Rich MD   Date: 11/19/21                    Asthma Triggers  How To Control Things That Make Your Asthma Worse    Triggers are things that make your asthma worse.  Look at the list below to help you find your triggers and   what you can do about them. You can help prevent asthma flare-ups by staying away from your triggers.      Trigger                                                          What you can do   Cigarette Smoke  Tobacco smoke can make asthma worse. Do not allow smoking in your home, car or around you.  Be sure no one smokes at a child s day care or school.  If you smoke, ask your health care provider for ways to help you quit.  Ask family members to quit too.  Ask your health care provider for a referral to Quit Plan to help you quit smoking, or call 4-324-295-PLAN.     Colds, Flu, Bronchitis  These are common triggers of asthma. Wash your hands often.  Don t touch your eyes, nose or mouth.  Get a flu shot every year.     Dust Mites  These are tiny bugs that live in cloth or carpet. They are too small to see. Wash sheets and blankets in hot water every week.   Encase pillows and mattress in dust mite proof covers.  Avoid having carpet if you can. If you have carpet, vacuum weekly.   Use a dust mask and HEPA vacuum.   Pollen and Outdoor Mold  Some people are allergic to trees, grass, or weed pollen, or molds. Try to keep your windows closed.  Limit time out doors when pollen count is high.   Ask you health care provider about taking medicine during allergy season.      Animal Dander  Some people are allergic to skin flakes, urine or saliva from pets with fur or feathers. Keep pets with fur or feathers out of your home.    If you can t keep the pet outdoors, then keep the pet out of your bedroom.  Keep the bedroom door closed.  Keep pets off cloth furniture and away from stuffed toys.     Mice, Rats, and Cockroaches  Some people are allergic to the waste from these pests.   Cover food and garbage.  Clean up spills and food crumbs.  Store grease in the refrigerator.   Keep food out of the bedroom.   Indoor Mold  This can be a trigger if your home has high moisture. Fix leaking faucets, pipes, or other sources of water.   Clean moldy surfaces.  Dehumidify basement if it is damp and smelly.   Smoke, Strong Odors, and Sprays  These can reduce air quality. Stay away from strong odors and sprays, such as perfume, powder, hair spray, paints, smoke incense, paint, cleaning products, candles and new carpet.   Exercise or Sports  Some people with asthma have this trigger. Be active!  Ask your doctor about taking medicine before sports or exercise to prevent symptoms.    Warm up for 5-10 minutes before and after sports or exercise.     Other Triggers of Asthma  Cold air:  Cover your nose and mouth with a scarf.  Sometimes laughing or crying can be a trigger.  Some medicines and food can trigger asthma.

## 2021-11-20 ENCOUNTER — ANESTHESIA EVENT (OUTPATIENT)
Dept: SURGERY | Facility: HOSPITAL | Age: 41
End: 2021-11-20
Payer: COMMERCIAL

## 2021-11-20 ASSESSMENT — ASTHMA QUESTIONNAIRES: ACT_TOTALSCORE: 24

## 2021-11-22 ENCOUNTER — APPOINTMENT (OUTPATIENT)
Dept: RADIOLOGY | Facility: HOSPITAL | Age: 41
End: 2021-11-22
Attending: UROLOGY
Payer: COMMERCIAL

## 2021-11-22 ENCOUNTER — ANESTHESIA (OUTPATIENT)
Dept: SURGERY | Facility: HOSPITAL | Age: 41
End: 2021-11-22
Payer: COMMERCIAL

## 2021-11-22 ENCOUNTER — HOSPITAL ENCOUNTER (OUTPATIENT)
Facility: HOSPITAL | Age: 41
Discharge: HOME | End: 2021-11-22
Attending: UROLOGY | Admitting: UROLOGY
Payer: COMMERCIAL

## 2021-11-22 VITALS
RESPIRATION RATE: 14 BRPM | OXYGEN SATURATION: 97 % | HEART RATE: 72 BPM | DIASTOLIC BLOOD PRESSURE: 58 MMHG | BODY MASS INDEX: 45.35 KG/M2 | WEIGHT: 315 LBS | SYSTOLIC BLOOD PRESSURE: 115 MMHG | TEMPERATURE: 97.1 F

## 2021-11-22 LAB
GLUCOSE BLDC GLUCOMTR-MCNC: 184 MG/DL (ref 70–99)
GLUCOSE BLDC GLUCOMTR-MCNC: 194 MG/DL (ref 70–99)

## 2021-11-22 PROCEDURE — 250N000011 HC RX IP 250 OP 636: Performed by: ANESTHESIOLOGY

## 2021-11-22 PROCEDURE — 250N000011 HC RX IP 250 OP 636: Performed by: PHYSICIAN ASSISTANT

## 2021-11-22 PROCEDURE — C2617 STENT, NON-COR, TEM W/O DEL: HCPCS | Performed by: UROLOGY

## 2021-11-22 PROCEDURE — 999N000180 XR SURGERY CARM FLUORO LESS THAN 5 MIN

## 2021-11-22 PROCEDURE — 258N000003 HC RX IP 258 OP 636: Performed by: ANESTHESIOLOGY

## 2021-11-22 PROCEDURE — C1769 GUIDE WIRE: HCPCS | Performed by: UROLOGY

## 2021-11-22 PROCEDURE — 250N000013 HC RX MED GY IP 250 OP 250 PS 637: Performed by: PHYSICIAN ASSISTANT

## 2021-11-22 PROCEDURE — 272N000001 HC OR GENERAL SUPPLY STERILE: Performed by: UROLOGY

## 2021-11-22 PROCEDURE — 710N000009 HC RECOVERY PHASE 1, LEVEL 1, PER MIN: Performed by: UROLOGY

## 2021-11-22 PROCEDURE — 370N000017 HC ANESTHESIA TECHNICAL FEE, PER MIN: Performed by: UROLOGY

## 2021-11-22 PROCEDURE — 250N000025 HC SEVOFLURANE, PER MIN: Performed by: UROLOGY

## 2021-11-22 PROCEDURE — C1894 INTRO/SHEATH, NON-LASER: HCPCS | Performed by: UROLOGY

## 2021-11-22 PROCEDURE — 999N000141 HC STATISTIC PRE-PROCEDURE NURSING ASSESSMENT: Performed by: UROLOGY

## 2021-11-22 PROCEDURE — 52356 CYSTO/URETERO W/LITHOTRIPSY: CPT | Mod: RT | Performed by: UROLOGY

## 2021-11-22 PROCEDURE — 250N000009 HC RX 250: Performed by: NURSE ANESTHETIST, CERTIFIED REGISTERED

## 2021-11-22 PROCEDURE — 250N000013 HC RX MED GY IP 250 OP 250 PS 637: Performed by: ANESTHESIOLOGY

## 2021-11-22 PROCEDURE — 255N000002 HC RX 255 OP 636: Performed by: UROLOGY

## 2021-11-22 PROCEDURE — 82365 CALCULUS SPECTROSCOPY: CPT | Performed by: UROLOGY

## 2021-11-22 PROCEDURE — 710N000012 HC RECOVERY PHASE 2, PER MINUTE: Performed by: UROLOGY

## 2021-11-22 PROCEDURE — 250N000011 HC RX IP 250 OP 636: Performed by: NURSE ANESTHETIST, CERTIFIED REGISTERED

## 2021-11-22 PROCEDURE — 82962 GLUCOSE BLOOD TEST: CPT

## 2021-11-22 PROCEDURE — 360N000082 HC SURGERY LEVEL 2 W/ FLUORO, PER MIN: Performed by: UROLOGY

## 2021-11-22 DEVICE — IMPLANTABLE DEVICE: Type: IMPLANTABLE DEVICE | Status: FUNCTIONAL

## 2021-11-22 RX ORDER — FENTANYL CITRATE 50 UG/ML
25 INJECTION, SOLUTION INTRAMUSCULAR; INTRAVENOUS EVERY 5 MIN PRN
Status: DISCONTINUED | OUTPATIENT
Start: 2021-11-22 | End: 2021-11-22 | Stop reason: HOSPADM

## 2021-11-22 RX ORDER — GABAPENTIN 300 MG/1
300 CAPSULE ORAL
Status: COMPLETED | OUTPATIENT
Start: 2021-11-22 | End: 2021-11-22

## 2021-11-22 RX ORDER — FENTANYL CITRATE 50 UG/ML
25 INJECTION, SOLUTION INTRAMUSCULAR; INTRAVENOUS
Status: DISCONTINUED | OUTPATIENT
Start: 2021-11-22 | End: 2021-11-22 | Stop reason: HOSPADM

## 2021-11-22 RX ORDER — ONDANSETRON 4 MG/1
4 TABLET, ORALLY DISINTEGRATING ORAL EVERY 30 MIN PRN
Status: DISCONTINUED | OUTPATIENT
Start: 2021-11-22 | End: 2021-11-22 | Stop reason: HOSPADM

## 2021-11-22 RX ORDER — OXYCODONE HYDROCHLORIDE 5 MG/1
5 TABLET ORAL EVERY 4 HOURS PRN
Status: DISCONTINUED | OUTPATIENT
Start: 2021-11-22 | End: 2021-11-22 | Stop reason: HOSPADM

## 2021-11-22 RX ORDER — LIDOCAINE 40 MG/G
CREAM TOPICAL
Status: DISCONTINUED | OUTPATIENT
Start: 2021-11-22 | End: 2021-11-22 | Stop reason: HOSPADM

## 2021-11-22 RX ORDER — ACETAMINOPHEN 325 MG/1
975 TABLET ORAL
Status: COMPLETED | OUTPATIENT
Start: 2021-11-22 | End: 2021-11-22

## 2021-11-22 RX ORDER — DEXAMETHASONE SODIUM PHOSPHATE 4 MG/ML
INJECTION, SOLUTION INTRA-ARTICULAR; INTRALESIONAL; INTRAMUSCULAR; INTRAVENOUS; SOFT TISSUE PRN
Status: DISCONTINUED | OUTPATIENT
Start: 2021-11-22 | End: 2021-11-22

## 2021-11-22 RX ORDER — ONDANSETRON 2 MG/ML
INJECTION INTRAMUSCULAR; INTRAVENOUS PRN
Status: DISCONTINUED | OUTPATIENT
Start: 2021-11-22 | End: 2021-11-22

## 2021-11-22 RX ORDER — SODIUM CHLORIDE, SODIUM LACTATE, POTASSIUM CHLORIDE, CALCIUM CHLORIDE 600; 310; 30; 20 MG/100ML; MG/100ML; MG/100ML; MG/100ML
INJECTION, SOLUTION INTRAVENOUS CONTINUOUS
Status: DISCONTINUED | OUTPATIENT
Start: 2021-11-22 | End: 2021-11-22 | Stop reason: HOSPADM

## 2021-11-22 RX ORDER — CEFAZOLIN SODIUM IN 0.9 % NACL 3 G/100 ML
3 INTRAVENOUS SOLUTION, PIGGYBACK (ML) INTRAVENOUS
Status: COMPLETED | OUTPATIENT
Start: 2021-11-22 | End: 2021-11-22

## 2021-11-22 RX ORDER — LIDOCAINE HYDROCHLORIDE 20 MG/ML
INJECTION, SOLUTION INFILTRATION; PERINEURAL PRN
Status: DISCONTINUED | OUTPATIENT
Start: 2021-11-22 | End: 2021-11-22

## 2021-11-22 RX ORDER — HYDROMORPHONE HCL IN WATER/PF 6 MG/30 ML
0.2 PATIENT CONTROLLED ANALGESIA SYRINGE INTRAVENOUS EVERY 5 MIN PRN
Status: DISCONTINUED | OUTPATIENT
Start: 2021-11-22 | End: 2021-11-22 | Stop reason: HOSPADM

## 2021-11-22 RX ORDER — ONDANSETRON 2 MG/ML
4 INJECTION INTRAMUSCULAR; INTRAVENOUS EVERY 30 MIN PRN
Status: DISCONTINUED | OUTPATIENT
Start: 2021-11-22 | End: 2021-11-22 | Stop reason: HOSPADM

## 2021-11-22 RX ORDER — FENTANYL CITRATE 50 UG/ML
INJECTION, SOLUTION INTRAMUSCULAR; INTRAVENOUS PRN
Status: DISCONTINUED | OUTPATIENT
Start: 2021-11-22 | End: 2021-11-22

## 2021-11-22 RX ORDER — PROPOFOL 10 MG/ML
INJECTION, EMULSION INTRAVENOUS CONTINUOUS PRN
Status: DISCONTINUED | OUTPATIENT
Start: 2021-11-22 | End: 2021-11-22

## 2021-11-22 RX ORDER — KETOROLAC TROMETHAMINE 15 MG/ML
15 INJECTION, SOLUTION INTRAMUSCULAR; INTRAVENOUS
Status: DISCONTINUED | OUTPATIENT
Start: 2021-11-22 | End: 2021-11-22 | Stop reason: HOSPADM

## 2021-11-22 RX ORDER — MEPERIDINE HYDROCHLORIDE 25 MG/ML
12.5 INJECTION INTRAMUSCULAR; INTRAVENOUS; SUBCUTANEOUS
Status: DISCONTINUED | OUTPATIENT
Start: 2021-11-22 | End: 2021-11-22 | Stop reason: HOSPADM

## 2021-11-22 RX ADMIN — DEXAMETHASONE SODIUM PHOSPHATE 4 MG: 4 INJECTION, SOLUTION INTRA-ARTICULAR; INTRALESIONAL; INTRAMUSCULAR; INTRAVENOUS; SOFT TISSUE at 07:48

## 2021-11-22 RX ADMIN — PROPOFOL 30 MCG/KG/MIN: 10 INJECTION, EMULSION INTRAVENOUS at 07:38

## 2021-11-22 RX ADMIN — MIDAZOLAM HYDROCHLORIDE 2 MG: 1 INJECTION, SOLUTION INTRAMUSCULAR; INTRAVENOUS at 07:29

## 2021-11-22 RX ADMIN — SUGAMMADEX 400 MG: 100 INJECTION, SOLUTION INTRAVENOUS at 08:10

## 2021-11-22 RX ADMIN — GABAPENTIN 300 MG: 300 CAPSULE ORAL at 06:33

## 2021-11-22 RX ADMIN — LIDOCAINE HYDROCHLORIDE 50 MG: 20 INJECTION, SOLUTION INFILTRATION; PERINEURAL at 07:31

## 2021-11-22 RX ADMIN — SODIUM CHLORIDE, POTASSIUM CHLORIDE, SODIUM LACTATE AND CALCIUM CHLORIDE: 600; 310; 30; 20 INJECTION, SOLUTION INTRAVENOUS at 06:30

## 2021-11-22 RX ADMIN — OXYCODONE HYDROCHLORIDE 5 MG: 5 TABLET ORAL at 10:31

## 2021-11-22 RX ADMIN — FENTANYL CITRATE 100 MCG: 50 INJECTION, SOLUTION INTRAMUSCULAR; INTRAVENOUS at 07:31

## 2021-11-22 RX ADMIN — ONDANSETRON 4 MG: 2 INJECTION INTRAMUSCULAR; INTRAVENOUS at 07:48

## 2021-11-22 RX ADMIN — ROCURONIUM BROMIDE 40 MG: 50 INJECTION, SOLUTION INTRAVENOUS at 07:31

## 2021-11-22 RX ADMIN — ACETAMINOPHEN 975 MG: 325 TABLET ORAL at 06:33

## 2021-11-22 RX ADMIN — FENTANYL CITRATE 25 MCG: 50 INJECTION INTRAMUSCULAR; INTRAVENOUS at 09:00

## 2021-11-22 RX ADMIN — FENTANYL CITRATE 25 MCG: 50 INJECTION INTRAMUSCULAR; INTRAVENOUS at 08:55

## 2021-11-22 RX ADMIN — SODIUM CHLORIDE, POTASSIUM CHLORIDE, SODIUM LACTATE AND CALCIUM CHLORIDE: 600; 310; 30; 20 INJECTION, SOLUTION INTRAVENOUS at 08:50

## 2021-11-22 RX ADMIN — PROPOFOL 180 MG: 10 INJECTION, EMULSION INTRAVENOUS at 07:31

## 2021-11-22 RX ADMIN — FENTANYL CITRATE 25 MCG: 50 INJECTION INTRAMUSCULAR; INTRAVENOUS at 08:50

## 2021-11-22 RX ADMIN — KETOROLAC TROMETHAMINE 15 MG: 15 INJECTION, SOLUTION INTRAMUSCULAR; INTRAVENOUS at 06:35

## 2021-11-22 RX ADMIN — HYDROMORPHONE HYDROCHLORIDE 0.2 MG: 0.2 INJECTION, SOLUTION INTRAMUSCULAR; INTRAVENOUS; SUBCUTANEOUS at 08:35

## 2021-11-22 RX ADMIN — FENTANYL CITRATE 25 MCG: 50 INJECTION INTRAMUSCULAR; INTRAVENOUS at 09:05

## 2021-11-22 RX ADMIN — Medication 3 G: at 07:37

## 2021-11-22 NOTE — ANESTHESIA CARE TRANSFER NOTE
Patient: Josué Franco    Procedure: Procedure(s):  CYSTOURETEROSCOPY, WITH LITHOTRIPSY USING NILESH 120P LASER AND URETERAL STENT INSERTION       Diagnosis: Calculus of ureter [N20.1]  Diagnosis Additional Information: No value filed.    Anesthesia Type:   General     Note:    Oropharynx: oropharynx clear of all foreign objects  Level of Consciousness: drowsy  Oxygen Supplementation: face mask  Level of Supplemental Oxygen (L/min / FiO2): 6  Independent Airway: airway patency satisfactory and stable  Dentition: dentition unchanged  Vital Signs Stable: post-procedure vital signs reviewed and stable  Report to RN Given: handoff report given  Patient transferred to: PACU    Handoff Report: Identifed the Patient, Identified the Reponsible Provider, Reviewed the pertinent medical history, Discussed the surgical course, Reviewed Intra-OP anesthesia mangement and issues during anesthesia, Set expectations for post-procedure period and Allowed opportunity for questions and acknowledgement of understanding      Vitals:  Vitals Value Taken Time   /55 11/22/21 0820   Temp 37.3  C (99.2  F) 11/22/21 0820   Pulse 72 11/22/21 0823   Resp 22 11/22/21 0823   SpO2 98 % 11/22/21 0823   Vitals shown include unvalidated device data.    Electronically Signed By: LAWSON Omer CRNA  November 22, 2021  8:25 AM

## 2021-11-22 NOTE — ANESTHESIA PROCEDURE NOTES
Airway       Patient location during procedure: OR       Procedure Start/Stop Times: 11/22/2021 7:34 AM  Staff -        Other Anesthesia Staff: Ranjana Dudley       Performed By: SRNA  Consent for Airway        Urgency: elective  Indications and Patient Condition       Indications for airway management: kacie-procedural       Induction type:intravenous       Mask difficulty assessment: 1 - vent by mask    Final Airway Details       Final airway type: endotracheal airway       Successful airway: ETT - single and Oral  Endotracheal Airway Details        ETT size (mm): 8.0       Cuffed: yes       Successful intubation technique: video laryngoscopy       VL Blade Size: Glidescope 3       Grade View of Cords: 1       Adjucts: stylet       Position: Left       Measured from: gums/teeth       Secured at (cm): 23       Bite block used: None    Post intubation assessment        Placement verified by: capnometry, equal breath sounds and chest rise        Number of attempts at approach: 1       Number of other approaches attempted: 0       Secured with: silk tape       Ease of procedure: easy       Dentition: Intact and Unchanged

## 2021-11-22 NOTE — ANESTHESIA POSTPROCEDURE EVALUATION
Patient: Josué Franco    Procedure: Procedure(s):  CYSTOURETEROSCOPY, WITH LITHOTRIPSY USING NILESH 120P LASER AND URETERAL STENT INSERTION       Diagnosis:Calculus of ureter [N20.1]  Diagnosis Additional Information: No value filed.    Anesthesia Type:  General    Note:  Disposition: Outpatient   Postop Pain Control: Uneventful            Sign Out: Well controlled pain   PONV: No   Neuro/Psych: Uneventful            Sign Out: Acceptable/Baseline neuro status   Airway/Respiratory: Uneventful            Sign Out: Acceptable/Baseline resp. status   CV/Hemodynamics: Uneventful            Sign Out: Acceptable CV status; No obvious hypovolemia; No obvious fluid overload   Other NRE: NONE   DID A NON-ROUTINE EVENT OCCUR? No           Last vitals:  Vitals Value Taken Time   /52 11/22/21 0910   Temp 37  C (98.6  F) 11/22/21 0900   Pulse 69 11/22/21 0917   Resp 16 11/22/21 0917   SpO2 92 % 11/22/21 0917   Vitals shown include unvalidated device data.    Electronically Signed By: Yordan Oakley MD  November 22, 2021  11:47 AM

## 2021-11-22 NOTE — ANESTHESIA PREPROCEDURE EVALUATION
Anesthesia Pre-Procedure Evaluation    Patient: Josué Franco   MRN: [id number] : 1980        Preoperative Diagnosis: Calculus of ureter [N20.1]    Procedure : Procedure(s):  CYSTOURETEROSCOPY, WITH LITHOTRIPSY USING NILESH 120P LASER AND URETERAL STENT INSERTION          Past Medical History:   Diagnosis Date     Asthma, persistent, severity to be determined      Diabetes mellitus type II, controlled (H)     with other complications     HTN (hypertension)      Morbid obesity (H)      Nephrolithiasis      ADRIANNA on CPAP      PONV (postoperative nausea and vomiting)       Past Surgical History:   Procedure Laterality Date     CHOLECYSTECTOMY       LITHOTRIPSY      Lithotrypsy     NISSEN FUNDOPLICATION      Nissen Fundiplication      Allergies   Allergen Reactions     No Known Allergies       Social History     Tobacco Use     Smoking status: Never Smoker     Smokeless tobacco: Never Used   Substance Use Topics     Alcohol use: No     Comment: Very moderate 0-1/week      Wt Readings from Last 1 Encounters:   21 (!) 160.3 kg (353 lb 6.4 oz)        Anesthesia Evaluation            ROS/MED HX  ENT/Pulmonary:     (+) sleep apnea, uses CPAP, Intermittent, asthma     Neurologic:       Cardiovascular:       METS/Exercise Tolerance:     Hematologic:       Musculoskeletal:       GI/Hepatic:       Renal/Genitourinary:     (+) renal disease, type: CRI,     Endo:     (+) type I DM, Obesity,     Psychiatric/Substance Use:       Infectious Disease:       Malignancy:       Other:            Physical Exam    Airway  airway exam normal      Mallampati: II       Respiratory Devices and Support         Dental  no notable dental history         Cardiovascular   cardiovascular exam normal       Rhythm and rate: regular and normal     Pulmonary   pulmonary exam normal        breath sounds clear to auscultation           OUTSIDE LABS:  CBC:   Lab Results   Component Value Date    WBC 11.5 (H) 2009    HGB 13.7  03/19/2012    HGB 15.1 09/04/2009    HCT 43.8 09/04/2009     09/04/2009     BMP:   Lab Results   Component Value Date     11/19/2021     12/04/2020    POTASSIUM 4.3 11/19/2021    POTASSIUM 4.2 12/04/2020    CHLORIDE 103 11/19/2021    CHLORIDE 106 12/04/2020    CO2 31 11/19/2021    CO2 26 12/04/2020    BUN 16 11/19/2021    BUN 14 12/04/2020    CR 0.87 11/19/2021    CR 0.81 12/04/2020     (H) 11/22/2021     (H) 11/19/2021     COAGS: No results found for: PTT, INR, FIBR  POC: No results found for: BGM, HCG, HCGS  HEPATIC:   Lab Results   Component Value Date    ALBUMIN 3.8 12/04/2020    PROTTOTAL 8.0 12/04/2020    ALT 31 12/04/2020    AST 12 12/04/2020    ALKPHOS 108 12/04/2020    BILITOTAL 0.4 12/04/2020     OTHER:   Lab Results   Component Value Date    A1C 9.0 (H) 11/19/2021    BRAD 9.1 11/19/2021    LIPASE 45 09/04/2009    TSH 2.68 10/24/2019       Anesthesia Plan    ASA Status:  3      Anesthesia Type: General.     - Airway: ETT   Induction: Intravenous, Propofol.   Maintenance: TIVA.   Techniques and Equipment:     - Airway: Video-Laryngoscope         Consents    Anesthesia Plan(s) and associated risks, benefits, and realistic alternatives discussed. Questions answered and patient/representative(s) expressed understanding.     - Discussed: Risks, Benefits and Alternatives for the PROCEDURE were discussed     - Discussed with:  Patient, Spouse      - Extended Intubation/Ventilatory Support Discussed: No.      - Patient is DNR/DNI Status: No    Use of blood products discussed: No .     Postoperative Care    Pain management: IV analgesics.   PONV prophylaxis: Ondansetron (or other 5HT-3), Dexamethasone or Solumedrol     Comments:    Other Comments: Negative COVID  GAETT  TIVA  Antiemetics  glidecope            Yordan Oakley MD

## 2021-11-22 NOTE — DISCHARGE INSTRUCTIONS
Discharge Instructions: After Your Surgery  You ve just had surgery. During surgery, you were given medicine called anesthesia to keep you relaxed and free of pain. After surgery, you may have some pain or nausea. This is common. Here are some tips for feeling better and getting well after surgery.     Stay on schedule with your medicine.   Going home  Your healthcare provider will show you how to take care of yourself when you go home. He or she will also answer your questions. Have an adult family member or friend drive you home. For the first 24 hours after your surgery:    Don't drive or use heavy equipment.    Don't make important decisions or sign legal papers.    Don't drink alcohol.    Have someone stay with you, if needed. He or she can watch for problems and help keep you safe.  Be sure to go to all follow-up visits with your healthcare provider. And rest after your surgery for as long as your healthcare provider tells you to.  Coping with pain  If you have pain after surgery, pain medicine will help you feel better. Take it as told, before pain becomes severe. Also, ask your healthcare provider or pharmacist about other ways to control pain. This might be with heat, ice, or relaxation. And follow any other instructions your surgeon or nurse gives you.  Tips for taking pain medicine  To get the best relief possible, remember these points:    Pain medicines can upset your stomach. Taking them with a little food may help.    Most pain relievers taken by mouth need at least 20 to 30 minutes to start to work.    Don't wait till your pain becomes severe before you take your medicine. Try to time your medicine so that you can take it before starting an activity. This might be before you get dressed, go for a walk, or sit down for dinner.    Constipation is a common side effect of pain medicines. Call your healthcare provider before taking any medicines such as laxatives or stool softeners to help ease  constipation. Also ask if you should skip any foods. Drinking lots of fluids and eating foods such as fruits and vegetables that are high in fiber can also help. Remember, don't take laxatives unless your surgeon has prescribed them.    Drinking alcohol and taking pain medicine can cause dizziness and slow your breathing. It can even be deadly. Don't drink alcohol while taking pain medicine.    Pain medicine can make you react more slowly to things. Don't drive or run machinery while taking pain medicine.  Your healthcare provider may tell you to take acetaminophen to help ease your pain. Ask him or her how much you are supposed to take each day. Acetaminophen or other pain relievers may interact with your prescription medicines or other over-the-counter (OTC) medicines. Some prescription medicines have acetaminophen and other ingredients. Using both prescription and OTC acetaminophen for pain can cause you to overdose. Read the labels on your OTC medicines with care. This will help you to clearly know the list of ingredients, how much to take, and any warnings. It may also help you not take too much acetaminophen. If you have questions or don't understand the information, ask your pharmacist or healthcare provider to explain it to you before you take the OTC medicine.  Managing nausea  Some people have an upset stomach after surgery. This is often because of anesthesia, pain, or pain medicine, or the stress of surgery. These tips will help you handle nausea and eat healthy foods as you get better. If you were on a special food plan before surgery, ask your healthcare provider if you should follow it while you get better. These tips may help:    Don't push yourself to eat. Your body will tell you when to eat and how much.    Start off with clear liquids and soup. They are easier to digest.    Next try semi-solid foods, such as mashed potatoes, applesauce, and gelatin, as you feel ready.    Slowly move to solid  foods. Don t eat fatty, rich, or spicy foods at first.    Don't force yourself to have 3 large meals a day. Instead eat smaller amounts more often.    Take pain medicines with a small amount of solid food, such as crackers or toast, to prevent nausea.  When to call your healthcare provider  Call your healthcare provider if:    You still have intolerable pain an hour after taking medicine. The medicine may not be strong enough.    You feel too sleepy, dizzy, or groggy. The medicine may be too strong.    You have side effects such as nausea or vomiting, or skin changes such as rash, itching, or hives. Your healthcare provider may suggest other medicines to control side effects.  Rash, itching, or hives may mean you have an allergic reaction. Report this right away. If you have trouble breathing or facial swelling, call 911 right away.  If you have obstructive sleep apnea  You were given anesthesia medicine during surgery to keep you comfortable and free of pain. After surgery, you may have more apnea spells because of this medicine and other medicines you were given. The spells may last longer than usual.   At home:    Keep using the continuous positive airway pressure (CPAP) device when you sleep. Unless your healthcare provider tells you not to, use it when you sleep, day or night. CPAP is a common device used to treat obstructive sleep apnea.    Talk with your provider before taking any pain medicine, muscle relaxants, or sedatives. Your provider will tell you about the possible dangers of taking these medicines.  [employer] last reviewed this educational content on 3/1/2019    0216-0598 [employer]. All rights reserved. This information is not intended as a substitute for professional medical care. Always follow your healthcare professional's instructions.

## 2021-11-22 NOTE — OP NOTE
[facility]  [facility] Operative Note    Josué Franco   November 22, 2021 11/22/2021   Belkis Chadwick     Procedure Performed  Procedure(s):  CYSTOURETEROSCOPY, WITH LITHOTRIPSY USING NILESH 120P LASER AND URETERAL STENT INSERTION  1. Cystoscopy  2. Retrograde Pyelography - Right  3. Ureteroscopic Laser Lithotripsy - Right Ureter Proximal  4. Ureteral Stent Insertion - Right      Pre-operative Diagnosis  Calculus of ureter [N20.1]    Post-operative Diagnosis  1. Stone Right Ureter Proximal      Surgeon(s) and Role:     * Bertrand Roy MD - Primary    Anesthesia Type  Not documented     Procedural Summary    Estimation of stone clearance: <2 mm residual  Subjective stone composition: calcium  Renal papillae assessment: plaques mild  Unanticipated event/findings: none  Post-operative plan: remove own stent in 1 week with extraction string return to clinic in 1 month without CT scan    Narrative    Successful clearance of irregular right proximal ureteral stone.     Procedural Details    Patient is brought to the surgical suite where anesthesia is induced. Josué Franco is prepped and draped in standard fashion in combined Trendelenberg and lithotomy position.    Cystoscopy: Rigid cystoscopy is performed. Anterior and posterior urethra are normal. Prostate demonstrates moderate adenopathy. Bladder is normal..     Retrograde Pyelography:  imaging fails to demonstrate radio-opaque proximal ureteric stone consistent with pre-operative imaging. Right retrograde pyelography demonstrates radiolucent proximal ureteric stone without obstruction.     Ureteral Access: Right ureteral access is initiated with Sensor wire. Guide wire access to the kidney is assured. 8F dilator is inserted with minimal resistance. 10F dilator is inserted with minimal resistance. 11F ureteral access sheath obturator is inserted with minimal resistance. 13F 36 cm ureteral access sheath is inserted  with minimal resistance.      Flexible Ureteroscopy: Flexible ureteroscope is passed to right proximal ureteric stone.   Stone is mildly impacted. Stone is translocated to dependent renal upper pole calyx. Laser lithotripsy is performed in the kidney. All stone fragments are removed from kidney.     Ureteral Stent Insertion: Right 7F 28 cm stent is inserted with good coil in kidney and bladder under fluoroscopic guidance. Stent extraction string left dangling from urethra.      The patient was then taken to the recovery room in good condition.     Past Medical History:   Diagnosis Date     Asthma, persistent, severity to be determined      Diabetes mellitus type II, controlled (H)     with other complications     HTN (hypertension)      Morbid obesity (H)      Nephrolithiasis      ADRIANNA on CPAP      PONV (postoperative nausea and vomiting)         Patient Active Problem List   Diagnosis     INTERMITTENT ASTHMA      CARDIOVASCULAR SCREENING; LDL GOAL LESS THAN 160     Oligospermia     Testicular hypofunction     KRISTINA (generalized anxiety disorder)     BMI over 45     Essential hypertension     ADRIANNA (obstructive sleep apnea)- severe (AHI 82)     Type 2 diabetes mellitus with other circulatory complication, without long-term current use of insulin (H)     Non compliance w medication regimen     Hyperlipidemia LDL goal <100        Estimated Blood Loss  .* No values recorded between 11/22/2021  7:44 AM and 11/22/2021  8:13 AM *     ID Type Source Tests Collected by Time Destination   A : RIGHT URETERAL STONE Calculus/Stone Kidney, Right STONE ANALYSIS Bertrand Roy MD 11/22/2021  8:06 AM

## 2021-11-23 DIAGNOSIS — N20.1 CALCULUS OF URETER: Primary | ICD-10-CM

## 2021-11-23 RX ORDER — OXYCODONE HYDROCHLORIDE 5 MG/1
5 TABLET ORAL EVERY 6 HOURS PRN
Qty: 12 TABLET | Refills: 0 | Status: SHIPPED | OUTPATIENT
Start: 2021-11-23 | End: 2021-11-26

## 2021-11-23 NOTE — TELEPHONE ENCOUNTER
Patient calling to request a refill of pain medication.  He had surgery yesterday and has his stent for a week.  Lamar Daniels RN

## 2021-11-25 ENCOUNTER — NURSE TRIAGE (OUTPATIENT)
Dept: NURSING | Facility: CLINIC | Age: 41
End: 2021-11-25
Payer: COMMERCIAL

## 2021-11-25 NOTE — TELEPHONE ENCOUNTER
Josué is calling and states that he had a kidney stone taken out on Monday and a stent was place.   Josué went to the bathroom and stent came out about 5 inches.  Denies pain.  Patient is requesting to speak with on call MD about what he should be doing.  Patient states that stent was due to come out on Monday, November 29th.  FNA paged on call Bertrand Montaño to phone FNA back FNA at 1:07 pm.  FNA paged [facility] at [phone].  MD advised that  patient should pull the stent out.  Also to keep taking Ibuprofen and tylenol for pain.  FNA relayed message to patient.    COVID 19 Nurse Triage Plan/Patient Instructions    Please be aware that novel coronavirus (COVID-19) may be circulating in the community. If you develop symptoms such as fever, cough, or SOB or if you have concerns about the presence of another infection including coronavirus (COVID-19), please contact your health care provider or visit [email or web address].     Disposition/Instructions    Home care recommended. Follow home care protocol based instructions.    Thank you for taking steps to prevent the spread of this virus.  o Limit your contact with others.  o Wear a simple mask to cover your cough.  o Wash your hands well and often.    Resources    [facility]: About COVID-19: [email or web address]    CDC: What to Do If You're Sick: www.cdc.gov/coronavirus/2019-ncov/about/steps-when-sick.html    CDC: Ending Home Isolation: www.cdc.gov/coronavirus/2019-ncov/hcp/disposition-in-home-patients.html     CDC: Caring for Someone: www.cdc.gov/coronavirus/2019-ncov/if-you-are-sick/care-for-someone.html     [facility]: Interim Guidance for Hospital Discharge to Home: www.[email or web address].[place]/diseases/coronavirus/hcp/hospdischarge.pdf    [facility] clinical trials (COVID-19 research studies): clinicalaffairs.[facility]/umn-clinical-trials     Below are the COVID-19 hotlines at the [place] Department [facility] ([facility]).  Interpreters are available.   o For health questions: Call 140-310-7028 or 1-361.623.8542 (7 a.m. to 7 p.m.)  o For questions about schools and childcare: Call 815-101-5785 or 1-862.583.2528 (7 a.m. to 7 p.m.)

## 2021-11-26 LAB
APPEARANCE STONE: NORMAL
COMPN STONE: NORMAL
SPECIMEN WT: 18 MG

## 2021-11-29 ENCOUNTER — TELEPHONE (OUTPATIENT)
Dept: UROLOGY | Facility: CLINIC | Age: 41
End: 2021-11-29
Payer: COMMERCIAL

## 2021-11-29 NOTE — TELEPHONE ENCOUNTER
Spoke with patient who states that he has had pain on and off since he removed his stent.  He has been better, but is still there.  Will check in with him in a couple days to see how his is and will schedule him for his one month f/u.  Lamar Daniels RN

## 2021-11-30 ENCOUNTER — TELEPHONE (OUTPATIENT)
Dept: UROLOGY | Facility: CLINIC | Age: 41
End: 2021-11-30
Payer: COMMERCIAL

## 2021-11-30 ENCOUNTER — LAB (OUTPATIENT)
Dept: LAB | Facility: CLINIC | Age: 41
End: 2021-11-30
Payer: COMMERCIAL

## 2021-11-30 DIAGNOSIS — N20.1 CALCULUS OF URETER: ICD-10-CM

## 2021-11-30 DIAGNOSIS — N20.1 CALCULUS OF URETER: Primary | ICD-10-CM

## 2021-11-30 DIAGNOSIS — N20.0 CALCULUS OF KIDNEY: ICD-10-CM

## 2021-11-30 LAB
ALBUMIN UR-MCNC: ABNORMAL MG/DL
APPEARANCE UR: CLEAR
BACTERIA #/AREA URNS HPF: ABNORMAL /HPF
BILIRUB UR QL STRIP: NEGATIVE
COLOR UR AUTO: YELLOW
GLUCOSE UR STRIP-MCNC: 100 MG/DL
HGB UR QL STRIP: ABNORMAL
KETONES UR STRIP-MCNC: NEGATIVE MG/DL
LEUKOCYTE ESTERASE UR QL STRIP: NEGATIVE
NITRATE UR QL: NEGATIVE
PH UR STRIP: 5.5 [PH] (ref 5–8)
RBC #/AREA URNS AUTO: ABNORMAL /HPF
SP GR UR STRIP: 1.02 (ref 1–1.03)
SQUAMOUS #/AREA URNS AUTO: ABNORMAL /LPF
UROBILINOGEN UR STRIP-ACNC: 0.2 E.U./DL
WBC #/AREA URNS AUTO: ABNORMAL /HPF

## 2021-11-30 PROCEDURE — 87086 URINE CULTURE/COLONY COUNT: CPT

## 2021-11-30 PROCEDURE — 87186 SC STD MICRODIL/AGAR DIL: CPT

## 2021-11-30 PROCEDURE — 87088 URINE BACTERIA CULTURE: CPT

## 2021-11-30 PROCEDURE — 81001 URINALYSIS AUTO W/SCOPE: CPT

## 2021-11-30 NOTE — TELEPHONE ENCOUNTER
Patient called stating that he has been having some chills and body aches with no fever.  No pain with urination and no blood in his urine.  No other symptoms, but to rule out infection, will have patient leave a urine specimen for testing, orders placed.  Lamar Daniels RN

## 2021-12-04 LAB — BACTERIA UR CULT: ABNORMAL

## 2021-12-06 ENCOUNTER — TELEPHONE (OUTPATIENT)
Dept: UROLOGY | Facility: CLINIC | Age: 41
End: 2021-12-06
Payer: COMMERCIAL

## 2021-12-06 DIAGNOSIS — A49.8 STAPHYLOCOCCUS EPIDERMIDIS INFECTION: Primary | ICD-10-CM

## 2021-12-06 RX ORDER — NITROFURANTOIN MACROCRYSTAL 100 MG
100 CAPSULE ORAL 2 TIMES DAILY
Qty: 14 CAPSULE | Refills: 0 | Status: SHIPPED | OUTPATIENT
Start: 2021-12-06 | End: 2021-12-13

## 2021-12-06 NOTE — TELEPHONE ENCOUNTER
Spoke to patient regarding [employer] message about his recent UC results and symptoms.  Patient was informed to  antibiotic Nitrofurantoin and take for 1 week.  Please see orders.  Patient understands and agree with plan.

## 2021-12-15 DIAGNOSIS — E11.59 TYPE 2 DIABETES MELLITUS WITH OTHER CIRCULATORY COMPLICATION, WITHOUT LONG-TERM CURRENT USE OF INSULIN (H): ICD-10-CM

## 2021-12-17 RX ORDER — GLIPIZIDE 5 MG/1
5 TABLET, FILM COATED, EXTENDED RELEASE ORAL DAILY
Qty: 30 TABLET | Refills: 1 | Status: SHIPPED | OUTPATIENT
Start: 2021-12-17 | End: 2021-12-23

## 2021-12-23 ENCOUNTER — VIRTUAL VISIT (OUTPATIENT)
Dept: UROLOGY | Facility: CLINIC | Age: 41
End: 2021-12-23
Payer: COMMERCIAL

## 2021-12-23 DIAGNOSIS — R30.0 DYSURIA: ICD-10-CM

## 2021-12-23 DIAGNOSIS — N20.1 CALCULUS OF URETER: Primary | ICD-10-CM

## 2021-12-23 PROCEDURE — 99213 OFFICE O/P EST LOW 20 MIN: CPT | Mod: GT | Performed by: UROLOGY

## 2021-12-23 ASSESSMENT — PAIN SCALES - GENERAL: PAINLEVEL: MODERATE PAIN (5)

## 2021-12-23 NOTE — PROGRESS NOTES
Assessment/Plan:    Assessment & Plan   Josué was seen today for follow up.    Diagnoses and all orders for this visit:    Calculus of ureter  -     Patient Stated Goal: Prevent further stones    Dysuria  -     Urine Culture Aerobic Bacterial - lab collect; Future  -     UA with Microscopic - lab collect; Future        Stone Management Plan  Stone Management 11/4/2021 11/11/2021 12/23/2021   Urinary Tract Infection No suspicion of infection No suspicion of infection Possible Infection   Renal Colic Well controlled symptoms Well controlled symptoms Well controlled symptoms   Renal Failure No suspicion of renal failure No suspicion of renal failure No suspicion of renal failure   Current CT date 10/26/2021 11/11/2021 -   Right sided stones? Yes Yes -   R Number of ureteral stones 1 1 -   R GSD of ureteral stones 5 5 -   R Location of ureteral stone Proximal Proximal -   R Number of kidney stones  No renal stones 1 -   R GSD of kidney stones - < 2 -   R Hydronephrosis Mild None -   R Stone Event New event Established event Resolved event   Diagnosis date 10/26/2021 - -   Initial location of primary symptomatic stone Proximal - -   Initial GSD of primary symptomatic stone 5 - -   Resolved date - - 12/23/2021   R Post-op status - - No imaging   R MET status Initiation No progression -   R Current Plan MET Clear -   MET 1 week F/U - -   Clear rationale - MET failure -   Left sided stones? No No -   L Stone Event No current event No current event No current event             PLAN    Video call duration: 10 minutes  15 minutes spent on the date of the encounter doing chart review, history and exam, documentation and further activities per the note    ALCIRA GAMEZ MD  [facility]    HPI    Josué GABY Salvador is a 41 year old  adult who is being evaluated via a billable video visit by [facility] [place] for late postoperative follow-up.     Josué Franco  returns status post Right ureteroscopic laser lithotripsy for proximal ureteral stone. Josué Franco has had no unanticipated post-operative events.     Significant current symptoms include:  dysuria and vague pelvic pain. Pertinent negative current symptoms include:  fever, chills and right flank pain.    Imaging was not performed today because confident stone clearance.     Stone composition was 100% calcium oxalate.     Josué Franco is a low risk remotely recurrent stone former and was educated on conservative strategies for risk reduction    He had a low count staph UTI after surgery. Will recheck U/A U/C. Possible low grade prostatitis. Overall seems to be improving.    ROS   Review of systems is negative except for HPI.    Past Medical History:   Diagnosis Date     Asthma, persistent, severity to be determined      Diabetes mellitus type II, controlled (H)     with other complications     HTN (hypertension)      Morbid obesity (H)      Nephrolithiasis      ADRIANNA on CPAP      PONV (postoperative nausea and vomiting)        Past Surgical History:   Procedure Laterality Date     CHOLECYSTECTOMY  1997     CYSTOURETEROSCOPY, WITH LITHOTRIPSY USING NILESH 120P LASER AND URETERAL STENT INSERTION Right 11/22/2021    Procedure: CYSTOURETEROSCOPY, WITH LITHOTRIPSY USING NILESH 120P LASER AND URETERAL STENT INSERTION;  Surgeon: Bertrand Roy MD;  Location: [facility] OR     LITHOTRIPSY  7/08    Lithotrypsy     NISSEN FUNDOPLICATION  2001    Nissen Fundiplication       Current Outpatient Medications   Medication Sig Dispense Refill     acetaminophen (TYLENOL) 500 MG tablet Take 500-1,000 mg by mouth every 6 hours as needed for mild pain       albuterol (PROAIR HFA/PROVENTIL HFA/VENTOLIN HFA) 108 (90 Base) MCG/ACT inhaler Inhale 2 puffs into the lungs every 4 hours as needed for shortness of breath / dyspnea 1 Inhaler 5     albuterol (PROVENTIL) (2.5 MG/3ML) 0.083% neb solution INHALE CONTENTS OF 1 VIAL VIA  NEBULIZER EVERY 4 HOURS AS NEEDED FOR WHEEZING. 1 Box 1     ASPIRIN NOT PRESCRIBED (INTENTIONAL) Antiplatelet medication not prescribed intentionally due to        blood glucose (ACCU-CHEK GUIDE) test strip Use to test blood sugar one to two times daily or as directed. 100 strip 11     blood glucose monitoring (ACCU-CHEK FASTCLIX) lancets Use to test blood sugar 1-2 times daily or as directed. 100 each 11     FLUoxetine (PROZAC) 20 MG capsule TAKE 3 CAPSULES BY MOUTH EVERY  capsule 1     ibuprofen (ADVIL/MOTRIN) 200 MG tablet Take 200 mg by mouth every 4 hours as needed for mild pain       LORazepam (ATIVAN) 1 MG tablet Take 1 tablet (1 mg) by mouth daily as needed for anxiety 20 tablet 0     metFORMIN (GLUCOPHAGE-XR) 500 MG 24 hr tablet TAKE 2 TABLETS (1,000 MG) BY MOUTH ONCE DAILY (WITH DINNER) 60 tablet 0     metoprolol succinate ER (TOPROL-XL) 50 MG 24 hr tablet TAKE 1 TABLET BY MOUTH EVERY DAY 90 tablet 3     ondansetron (ZOFRAN-ODT) 4 MG ODT tab Take 4 mg by mouth       order for DME CPAP 11 cm 1 Units 1     semaglutide (OZEMPIC, 0.25 OR 0.5 MG/DOSE,) 2 MG/1.5ML SOPN pen Inject 0.5 mg Subcutaneous every 7 days 1.52 mL 0     oxyCODONE-acetaminophen (PERCOCET) 5-325 MG tablet Take 1-2 tablets by mouth (Patient not taking: Reported on 12/23/2021)         Allergies   Allergen Reactions     No Known Allergies        Social History     Socioeconomic History     Marital status: Single     Spouse name: Not on file     Number of children: 0     Years of education: Not on file     Highest education level: Not on file   Occupational History     Occupation: IT tech     Employer: [employer]     Comment: IT     Occupation: IT tech     Comment: [employer]   Tobacco Use     Smoking status: Never Smoker     Smokeless tobacco: Never Used   Vaping Use     Vaping Use: Never used   Substance and Sexual Activity     Alcohol use: No     Comment: Very moderate 0-1/week     Drug use: No     Sexual activity: Yes     Partners:  Female   Other Topics Concern     Parent/sibling w/ CABG, MI or angioplasty before 65F 55M? Yes     Comment: FATHER, ANGIOPLASTY AGE 52   Social History Narrative     Not on file     Social Determinants of Health     Financial Resource Strain: Not on file   Food Insecurity: Not on file   Transportation Needs: Not on file   Physical Activity: Not on file   Stress: Not on file   Social Connections: Not on file   Intimate Partner Violence: Not on file   Housing Stability: Not on file       Family History   Problem Relation Age of Onset     Coronary Artery Disease Father      Diabetes No family hx of      Hypertension No family hx of      Colon Cancer No family hx of        Objective:     Appears AAO x 3  No vitals obtained due to virtual visit    Labs   Most Recent 3 CBC's:No lab results found.  Most Recent 3 BMP's:Recent Labs   Lab Test 11/22/21  0843 11/22/21  0614 11/19/21  0803 12/04/20  1034 01/24/20  0929   NA  --   --  136 137 137   POTASSIUM  --   --  4.3 4.2 3.9   CHLORIDE  --   --  103 106 106   CO2  --   --  31 26 24   BUN  --   --  16 14 12   CR  --   --  0.87 0.81 0.72   ANIONGAP  --   --  2* 5 7   BRAD  --   --  9.1 9.1 8.8   * 184* 217* 125* 146*     Most Recent Urinalysis:Recent Labs   Lab Test 11/30/21  1428   COLOR Yellow   APPEARANCE Clear   URINEGLC 100 *   URINEBILI Negative   URINEKETONE Negative   SG 1.025   UBLD Small*   URINEPH 5.5   PROTEIN Trace*   UROBILINOGEN 0.2   NITRITE Negative   LEUKEST Negative   RBCU 5-10*   WBCU 0-5     Acute Labs   Urine Culture    Culture   Date Value Ref Range Status   11/30/2021 10,000-50,000 CFU/mL Staphylococcus epidermidis (A)  Final

## 2021-12-23 NOTE — PROGRESS NOTES
Patient states that they are in [place] at the time of their visit.  Patient is aware telehealth visit is billable and agrees to proceed.  Lamar Daniels RN

## 2021-12-24 ENCOUNTER — IMMUNIZATION (OUTPATIENT)
Dept: NURSING | Facility: CLINIC | Age: 41
End: 2021-12-24
Payer: COMMERCIAL

## 2021-12-24 PROCEDURE — 0064A COVID-19,PF,MODERNA (18+ YRS BOOSTER .25ML): CPT

## 2021-12-24 PROCEDURE — 91306 COVID-19,PF,MODERNA (18+ YRS BOOSTER .25ML): CPT

## 2021-12-27 ENCOUNTER — TELEPHONE (OUTPATIENT)
Dept: UROLOGY | Facility: CLINIC | Age: 41
End: 2021-12-27
Payer: COMMERCIAL

## 2021-12-30 ENCOUNTER — LAB (OUTPATIENT)
Dept: LAB | Facility: CLINIC | Age: 41
End: 2021-12-30
Payer: COMMERCIAL

## 2021-12-30 DIAGNOSIS — R30.0 DYSURIA: ICD-10-CM

## 2021-12-30 LAB
ALBUMIN UR-MCNC: NEGATIVE MG/DL
APPEARANCE UR: CLEAR
BILIRUB UR QL STRIP: NEGATIVE
COLOR UR AUTO: YELLOW
GLUCOSE UR STRIP-MCNC: NEGATIVE MG/DL
HGB UR QL STRIP: NEGATIVE
KETONES UR STRIP-MCNC: ABNORMAL MG/DL
LEUKOCYTE ESTERASE UR QL STRIP: NEGATIVE
NITRATE UR QL: NEGATIVE
PH UR STRIP: 5.5 [PH] (ref 5–7)
RBC #/AREA URNS AUTO: NORMAL /HPF
SP GR UR STRIP: 1.02 (ref 1–1.03)
UROBILINOGEN UR STRIP-ACNC: 0.2 E.U./DL
WBC #/AREA URNS AUTO: NORMAL /HPF

## 2021-12-30 PROCEDURE — 87086 URINE CULTURE/COLONY COUNT: CPT

## 2021-12-30 PROCEDURE — 81001 URINALYSIS AUTO W/SCOPE: CPT

## 2021-12-31 ENCOUNTER — TELEPHONE (OUTPATIENT)
Dept: UROLOGY | Facility: CLINIC | Age: 41
End: 2021-12-31
Payer: COMMERCIAL

## 2021-12-31 LAB — BACTERIA UR CULT: NO GROWTH

## 2021-12-31 NOTE — CONFIDENTIAL NOTE
Patient advised of negative urine culture.  He states that he continues to have some pain.  He will continue to monitor and will call if it worsens or doesn't resolve.  Lamar Daniels RN

## 2022-01-10 NOTE — PROGRESS NOTES
"Josué is a 41 year old who is being evaluated via a billable video visit.      How would you like to obtain your AVS? MyChart  If the video visit is dropped, the invitation should be resent by:   Will anyone else be joining your video visit? No    Video Start Time: 7:07 AM    Assessment & Plan       ICD-10-CM    1. Type 2 diabetes mellitus with other circulatory complication, without long-term current use of insulin (H)  E11.59 dulaglutide (TRULICITY) 0.75 MG/0.5ML pen     metFORMIN (GLUCOPHAGE-XR) 500 MG 24 hr tablet   2. Morbid obesity due to excess calories (H)  E66.01    3. Advanced directives, counseling/discussion  Z71.89    4. Essential hypertension  I10 metoprolol succinate ER (TOPROL-XL) 50 MG 24 hr tablet         DM- improving, but SA on Ozempic. This is stopped. Starting slow tapering of Trulicity. Pt. startign at 0.75 mg if BG not at goal in 1 month will increase to 1.5 mg.   Re-check with PE/labs in Early March advised.   Healthy habits.   Pt. Has eye exam next month.   HTN- likely stable.   Declined advanced directive information.            BMI:   Estimated body mass index is 45.35 kg/m  as calculated from the following:    Height as of 11/19/21: 1.88 m (6' 2.02\").    Weight as of 11/22/21: 160.3 kg (353 lb 6.4 oz).   Weight management plan: continue to work towards weight loss.     MEDICATIONS:  Continue current medications without change    No follow-ups on file.    LAWSON Hernandez CNP  [facility] RINA Moses is a 41 year old who presents for the following health issues     HPI     Nov.   Came in for pre-op and re-started Ozempic. Has stomach cramping, pain and nausea.   Feeling bloated, gassy and loose stools. Cannot tolerate this. Had stone removed and symptoms on Ozempic are not good.     DM- HTN.   BG levels in 120-140 range. Not seeing recent refills of Metformin or Metoprolol, pt. States has supply. Is taking.       Denies CP or symptoms of high blood " pressure.           Review of Systems   Constitutional, HEENT, cardiovascular, pulmonary, gi and gu systems are negative, except as otherwise noted.      Objective           Vitals:  No vitals were obtained today due to virtual visit.    Physical Exam   GENERAL: Healthy, alert and no distress  EYES: Eyes grossly normal to inspection.  No discharge or erythema, or obvious scleral/conjunctival abnormalities.  RESP: No audible wheeze, cough, or visible cyanosis.  No visible retractions or increased work of breathing.    SKIN: Visible skin clear. No significant rash, abnormal pigmentation or lesions.  NEURO: Cranial nerves grossly intact.  Mentation and speech appropriate for age.  PSYCH: Mentation appears normal, affect normal/bright, judgement and insight intact, normal speech and appearance well-groomed.    Lab on 12/30/2021   Component Date Value Ref Range Status     Color Urine 12/30/2021 Yellow  Colorless, Straw, Light Yellow, Yellow Final     Appearance Urine 12/30/2021 Clear  Clear Final     Glucose Urine 12/30/2021 Negative  Negative mg/dL Final     Bilirubin Urine 12/30/2021 Negative  Negative Final     Ketones Urine 12/30/2021 Trace* Negative mg/dL Final     Specific Gravity Urine 12/30/2021 1.025  1.003 - 1.035 Final     Blood Urine 12/30/2021 Negative  Negative Final     pH Urine 12/30/2021 5.5  5.0 - 7.0 Final     Protein Albumin Urine 12/30/2021 Negative  Negative mg/dL Final     Urobilinogen Urine 12/30/2021 0.2  0.2, 1.0 E.U./dL Final     Nitrite Urine 12/30/2021 Negative  Negative Final     Leukocyte Esterase Urine 12/30/2021 Negative  Negative Final     Culture 12/30/2021 No Growth   Final     RBC Urine 12/30/2021 None Seen  0-2 /HPF /HPF Final     WBC Urine 12/30/2021 0-5  0-5 /HPF /HPF Final               Video-Visit Details    Type of service:  Video Visit    Video End Time:7:21 AM    Originating Location (pt. Location): Home    Distant Location (provider location):  [facility]      Platform used for Video Visit: Christopher

## 2022-01-11 ENCOUNTER — VIRTUAL VISIT (OUTPATIENT)
Dept: FAMILY MEDICINE | Facility: CLINIC | Age: 42
End: 2022-01-11
Payer: COMMERCIAL

## 2022-01-11 DIAGNOSIS — Z71.89 ADVANCED DIRECTIVES, COUNSELING/DISCUSSION: ICD-10-CM

## 2022-01-11 DIAGNOSIS — I10 ESSENTIAL HYPERTENSION: ICD-10-CM

## 2022-01-11 DIAGNOSIS — E11.59 TYPE 2 DIABETES MELLITUS WITH OTHER CIRCULATORY COMPLICATION, WITHOUT LONG-TERM CURRENT USE OF INSULIN (H): Primary | ICD-10-CM

## 2022-01-11 DIAGNOSIS — E66.01 MORBID OBESITY DUE TO EXCESS CALORIES (H): ICD-10-CM

## 2022-01-11 PROCEDURE — 99214 OFFICE O/P EST MOD 30 MIN: CPT | Mod: GT | Performed by: NURSE PRACTITIONER

## 2022-01-11 RX ORDER — DULAGLUTIDE 0.75 MG/.5ML
0.75 INJECTION, SOLUTION SUBCUTANEOUS
Qty: 2 ML | Refills: 0 | Status: SHIPPED | OUTPATIENT
Start: 2022-01-11 | End: 2022-02-08

## 2022-01-11 RX ORDER — METFORMIN HCL 500 MG
TABLET, EXTENDED RELEASE 24 HR ORAL
Qty: 180 TABLET | Refills: 0 | Status: SHIPPED | OUTPATIENT
Start: 2022-01-11 | End: 2022-04-06

## 2022-01-11 RX ORDER — METOPROLOL SUCCINATE 50 MG/1
50 TABLET, EXTENDED RELEASE ORAL DAILY
Qty: 90 TABLET | Refills: 3 | Status: SHIPPED | OUTPATIENT
Start: 2022-01-11 | End: 2022-11-18

## 2022-01-11 ASSESSMENT — ANXIETY QUESTIONNAIRES
1. FEELING NERVOUS, ANXIOUS, OR ON EDGE: SEVERAL DAYS
3. WORRYING TOO MUCH ABOUT DIFFERENT THINGS: SEVERAL DAYS
2. NOT BEING ABLE TO STOP OR CONTROL WORRYING: SEVERAL DAYS
5. BEING SO RESTLESS THAT IT IS HARD TO SIT STILL: NOT AT ALL
7. FEELING AFRAID AS IF SOMETHING AWFUL MIGHT HAPPEN: SEVERAL DAYS
6. BECOMING EASILY ANNOYED OR IRRITABLE: NOT AT ALL
GAD7 TOTAL SCORE: 4
IF YOU CHECKED OFF ANY PROBLEMS ON THIS QUESTIONNAIRE, HOW DIFFICULT HAVE THESE PROBLEMS MADE IT FOR YOU TO DO YOUR WORK, TAKE CARE OF THINGS AT HOME, OR GET ALONG WITH OTHER PEOPLE: NOT DIFFICULT AT ALL

## 2022-01-11 ASSESSMENT — PATIENT HEALTH QUESTIONNAIRE - PHQ9
5. POOR APPETITE OR OVEREATING: NOT AT ALL
SUM OF ALL RESPONSES TO PHQ QUESTIONS 1-9: 4

## 2022-01-12 ASSESSMENT — ANXIETY QUESTIONNAIRES: GAD7 TOTAL SCORE: 4

## 2022-02-02 DIAGNOSIS — F41.1 GAD (GENERALIZED ANXIETY DISORDER): Chronic | ICD-10-CM

## 2022-02-02 NOTE — TELEPHONE ENCOUNTER
Pending Prescriptions:                       Disp   Refills    FLUoxetine (PROZAC) 20 MG capsule [Pharmac*270 ca*2        Sig: TAKE 3 CAPSULES BY MOUTH EVERY DAY    Routing refill request to provider for review/approval because:  Drug interaction warning: Ibuprofen & Prozac

## 2022-02-08 DIAGNOSIS — E11.59 TYPE 2 DIABETES MELLITUS WITH OTHER CIRCULATORY COMPLICATION, WITHOUT LONG-TERM CURRENT USE OF INSULIN (H): ICD-10-CM

## 2022-02-08 RX ORDER — DULAGLUTIDE 0.75 MG/.5ML
0.75 INJECTION, SOLUTION SUBCUTANEOUS
Qty: 2 ML | Refills: 0 | Status: SHIPPED | OUTPATIENT
Start: 2022-02-08 | End: 2022-03-11

## 2022-02-08 NOTE — TELEPHONE ENCOUNTER
Prescription approved per [facility] Refill Protocol.  KANCHAN MarshallN, RN, PHN  [place]  February 8, 2022

## 2022-02-21 NOTE — PATIENT INSTRUCTIONS
Patient Stated Goal: Prevent further stones  Calcium Oxalate Stone Prevention Self Management    Drink more fluids:    Drinking more liquids is the best way you can help prevent future stones. Stones can form when substances in the urine are too concentrated. The more you drink, the more urine you will make. This means all substances in the urine will be less concentrated.    How much urine should I be producing?    The usual recommended daily urine production is about 2 to 3 quarts (7472-4083 ml). If you are producing more than 3 quarts of urine on a regular basis, it is possible to deplete important minerals stored in the body.    To measure the amount of urine you produce in a day, you can either:    Collect all urine in a container and measure at the end of the day     Use a measuring cup each time you urinate and add up the amounts at the end of the day     Observe    Color - Dark andrew urine is concentrated. Light straw color or lighter is dilute and desirable     Odor - Concentrated urine tends to smell stronger. Dilute urine is nearly odorless    Ways to increase your fluid intake    Increasing the amount of fluid you drink is effective for all types of kidney stones. While water is commonly recommended, all fluids are effective for increasing the amount of urine your body produces.    Focus on starting a lifelong habit, rather than a short-term solution.     Keep liquids on hand that you like. Crystal Light is a low calorie appropriate choice.    Drink out of larger glasses. You'll tend to drink more with each serving.     Have an additional glass of fluid with each meal.     Keep a water or drink bottle at work and fill it regularly.     *If you are prone to fluid retention, consult your doctor before making changes to your fluid habits.    Low Oxalate Diet:    Avoid excess amounts or daily consumption of these foods:    All nuts and nut products including peanuts, almonds, pecans, peanut butter, almond  milk    Rhubarb    Chocolate    Soybeans and soy products     Spinach    Wheat Germ    Beets    Maintain a normal calcium diet:    Researches have found that people with low calcium intakes tend to have more stones. Foods with high calcium content are acceptable and include:    Dairy products (including milk, cheese and yogurt)    Meat and fish    Enriched cereals    Dark green vegetables    What about calcium supplements?     Many people take calcium supplements, either on their own or as prescribed by a doctor. Research has indicated that calcium supplements do not usually pose a risk for stone formation.  Calcium citrate is a better choice for a supplement.    Avoid excess salt:    Salt (sodium chloride) is found in abundance in many foods. High sodium levels in the urine can interfere with the kidney's handling of calcium.     Tips for reducing the salt in your diet:    Don't use salt at the table    Reduce the salt used in food preparation. Try 1/2 teaspoon when recipes call for 1 teaspoon.    Use herbs and spices for flavoring instead of salt.    Avoid salty foods.    Check the label before you buy or use a product. Note sodium and portion size information.    Try to consume less than 2,000 mg/day. (1 teaspoon = 2,000 mg)    Foods with high sodium content include:    Processed meat (including luncheon meats, sausage)     Crackers     Instant cereal     Processed cheese     Canned soups     Chips and snack foods     Soy sauce    [facility] can respond to your questions or concerns 24 hours a day at [phone].       The patient is a 36y Male complaining of

## 2022-03-08 DIAGNOSIS — F41.1 GAD (GENERALIZED ANXIETY DISORDER): Chronic | ICD-10-CM

## 2022-03-10 DIAGNOSIS — E11.59 TYPE 2 DIABETES MELLITUS WITH OTHER CIRCULATORY COMPLICATION, WITHOUT LONG-TERM CURRENT USE OF INSULIN (H): ICD-10-CM

## 2022-03-10 NOTE — LETTER
March 14, 2022      Josué Franco  [address] [address] [place], [place] [address] [address]              Cristino Moses,     We just wanted to let you know that we sent in a refill for your Trulicity but you are due for a office visit before your next refill is due.      Please give us a call at [phone] or use [employer] to schedule.      Have a great day.     Your [facility] Team

## 2022-03-11 RX ORDER — DULAGLUTIDE 0.75 MG/.5ML
0.75 INJECTION, SOLUTION SUBCUTANEOUS
Qty: 2 ML | Refills: 0 | Status: SHIPPED | OUTPATIENT
Start: 2022-03-11 | End: 2022-04-06

## 2022-03-11 NOTE — TELEPHONE ENCOUNTER
Pending Prescriptions:                       Disp   Refills    TRULICITY 0.75 MG/0.5ML pen [Pharmacy Med *                Sig: INJECT 0.75 MG SUBCUTANEOUS EVERY 7 DAYS    Routing refill request to provider for review/approval because:  Labs not current:    Lab Results   Component Value Date    A1C 9.0 11/19/2021    A1C 7.1 12/04/2020    A1C 7.4 01/24/2020    A1C 7.7 10/24/2019    A1C 7.1 05/03/2019    A1C 7.6 02/21/2019     TRULICITY 0.75 MG/0.5ML pen 2 mL 0 2/8/2022  No   Sig - Route: INJECT 0.75 MG SUBCUTANEOUS EVERY 7 DAYS - Subcutaneous   Sent to pharmacy as: Trulicity 0.75 MG/0.5ML Subcutaneous Solution Pen-injector (dulaglutide)   Class: E-Prescribe   Order: [id number]   E-Prescribing Status: Receipt confirmed by pharmacy (2/8/2022  2:29 PM CST)     Bhavya Mcelroy RN on 3/11/2022 at 12:35 PM

## 2022-04-06 ENCOUNTER — VIRTUAL VISIT (OUTPATIENT)
Dept: PHARMACY | Facility: CLINIC | Age: 42
End: 2022-04-06
Payer: COMMERCIAL

## 2022-04-06 DIAGNOSIS — I10 ESSENTIAL HYPERTENSION: ICD-10-CM

## 2022-04-06 DIAGNOSIS — J45.20 MILD INTERMITTENT ASTHMA WITHOUT COMPLICATION: ICD-10-CM

## 2022-04-06 DIAGNOSIS — E11.59 TYPE 2 DIABETES MELLITUS WITH OTHER CIRCULATORY COMPLICATION, WITHOUT LONG-TERM CURRENT USE OF INSULIN (H): Primary | ICD-10-CM

## 2022-04-06 DIAGNOSIS — F41.1 GAD (GENERALIZED ANXIETY DISORDER): Chronic | ICD-10-CM

## 2022-04-06 PROCEDURE — 99605 MTMS BY PHARM NP 15 MIN: CPT | Performed by: PHARMACIST

## 2022-04-06 RX ORDER — DULAGLUTIDE 0.75 MG/.5ML
0.75 INJECTION, SOLUTION SUBCUTANEOUS
Qty: 2 ML | Refills: 2 | Status: SHIPPED | OUTPATIENT
Start: 2022-04-06 | End: 2022-07-21

## 2022-04-06 RX ORDER — METFORMIN HCL 500 MG
TABLET, EXTENDED RELEASE 24 HR ORAL
Qty: 180 TABLET | Refills: 0 | Status: SHIPPED | OUTPATIENT
Start: 2022-04-06 | End: 2022-07-06

## 2022-04-06 NOTE — PATIENT INSTRUCTIONS
Recommendations from today's MTM visit:                                                    MTM (medication therapy management) is a service provided by a clinical pharmacist designed to help you get the most of out of your medicines.   Today we reviewed what your medicines are for, how to know if they are working, that your medicines are safe and how to make your medicine regimen as easy as possible.      1. Refilled Trulicity, fluoxetine, metformin    2. Call the [facility] at [phone] to schedule a follow-up appointment with Belkis Chadwick to recheck your labs and for future refills.     Follow-up: 6 months or sooner if needed    It was great to speak with you today.  I value your experience and would be very thankful for your time with providing feedback on our clinic survey. You may receive a survey via email or text message in the next few days.     To schedule another MTM appointment, please call the clinic directly or you may call the MTM scheduling line at [phone] or toll-free at [phone].     My Clinical Pharmacist's contact information:                                                      Please feel free to contact me with any questions or concerns you have.      Nico Garcia, Lindsay, BCACP  Medication Therapy Management Pharmacist  Pager: [phone]

## 2022-04-06 NOTE — PROGRESS NOTES
Medication Therapy Management (MTM) Encounter    ASSESSMENT:                            Medication Adherence/Access: No issues identified    Type 2 Diabetes:  Improved per home readings. Due for follow-up/recheck of A1c.     Anxiety: Stable per patient.     Hypertension: Stable. Last blood pressure is at goal of <140/90 mmHg.     Asthma: Stable per patient.     PLAN:                            1. Refilled Trulicity, fluoxetine, metformin    2. Call the [facility] at [phone] to schedule a follow-up appointment with Belkis Chadwick to recheck your labs and for future refills.     Follow-up: 6 months or sooner if needed    SUBJECTIVE/OBJECTIVE:                          Josué Franco is a 41 year old adult called for an initial visit. Josué Franco was referred to me from his [employer] insurance plan.      Reason for visit: Comprehensive medication review.    Allergies/ADRs: Reviewed in chart  Past Medical History: Reviewed in chart  Tobacco: Josué Franco reports that Josué Franco has never smoked. Josué Franco has never used smokeless tobacco.  Alcohol: none      Medication Adherence/Access: Patient takes medications directly from bottles.  Patient takes medications 2 time(s) per day.   Per patient, misses medication 0 times per week.   Medication barriers: none.   The patient fills medications at [facility]: NO, fills medications at [facility] [place].    Type 2 Diabetes:  Currently taking Trulicity 0.75 mg weekly on  and metformin XR 1,000 mg once daily. Patient is not experiencing side effects. Had many digestive side effects on Ozempic 0.25 mg - no issues on current Trulicity dose   Blood sugar monitorin time(s) daily. Ranges (patient reported): 130-140 mg/dL (usually nighttime sugars per patient)  Symptoms of low blood sugar? none  Symptoms of high blood sugar? none  Eye exam: due  Foot exam: up to date  Diet/Exercise: appetite improve slid  Aspirin: Not taking due to age  Statin:  No - LDL is less than 70 mg/dL - no cardiovascular disease  ACEi/ARB: No.   Urine Albumin:   Lab Results   Component Value Date    UMALCR 20.10 11/19/2021      Lab Results   Component Value Date    A1C 9.0 11/19/2021    A1C 7.1 12/04/2020    A1C 7.4 01/24/2020    A1C 7.7 10/24/2019    A1C 7.1 05/03/2019    A1C 7.6 02/21/2019     Recent Labs   Lab Test 11/19/21  0803 12/04/20  1034   CHOL 129 137   HDL 41 38*   LDL 62 69   TRIG 130 152*       Anxiety: Currently taking fluoxetine 60 mg by mouth daily and lorazepam 1 mg daily as needed (every few weeks per patient). Pt finds this to be effective. Pt reports no known issues.     Hypertension: Current medications include metoprolol XL 50 mg daily.  Patient does not self-monitor blood pressure.  Patient reports no current medication side effects.  BP Readings from Last 3 Encounters:   11/22/21 115/58   11/19/21 132/86   01/29/20 134/82     Asthma: Current medications: albuterol HFA or nebulizers as needed - has not needed recently.    Patient reports the following symptoms: none.  Asthma Action Plan on file: NO  ACT Total Scores 1/24/2020 1/24/2020 11/19/2021   ACT TOTAL SCORE - - -   ASTHMA ER VISITS - - -   ASTHMA HOSPITALIZATIONS - - -   ACT TOTAL SCORE (Goal Greater than or Equal to 20) 23 23 24   In the past 12 months, how many times did you visit the emergency room for your asthma without being admitted to the hospital? 0 0 0   In the past 12 months, how many times were you hospitalized overnight because of your asthma? 0 0 0       Today's Vitals: There were no vitals taken for this visit.     Wt Readings from Last 5 Encounters:   11/22/21 (!) 353 lb 6.4 oz (160.3 kg)   11/19/21 (!) 355 lb (161 kg)   01/24/20 (!) 364 lb 12.8 oz (165.5 kg)   10/24/19 (!) 367 lb (166.5 kg)   05/03/19 (!) 366 lb (166 kg)     ----------------      I spent 15 minutes with this patient today. All changes were made via collaborative practice agreement with LAWSON Hernandez CNP. A copy  of the visit note was provided to the patient's provider(s).    The patient was sent via [employer] a summary of these recommendations.     Nico Garcia, DonnaD, [facility]  Medication Therapy Management Pharmacist  Pager: [phone]    Telemedicine Visit Details  Type of service:  Telephone visit  Start Time: 9:10 AM  End Time: 9:25 AM  Originating Location (patient location): [place]  Distant Location (provider location):  [facility]     Medication Therapy Recommendations  No medication therapy recommendations to display

## 2022-04-07 ENCOUNTER — TELEPHONE (OUTPATIENT)
Dept: FAMILY MEDICINE | Facility: CLINIC | Age: 42
End: 2022-04-07
Payer: COMMERCIAL

## 2022-04-15 ENCOUNTER — TRANSFERRED RECORDS (OUTPATIENT)
Dept: HEALTH INFORMATION MANAGEMENT | Facility: CLINIC | Age: 42
End: 2022-04-15

## 2022-04-15 LAB — RETINOPATHY: NORMAL

## 2022-04-30 DIAGNOSIS — F41.1 GAD (GENERALIZED ANXIETY DISORDER): Chronic | ICD-10-CM

## 2022-05-01 DIAGNOSIS — I10 ESSENTIAL HYPERTENSION: ICD-10-CM

## 2022-05-03 RX ORDER — METOPROLOL SUCCINATE 50 MG/1
TABLET, EXTENDED RELEASE ORAL
Qty: 90 TABLET | Refills: 3 | OUTPATIENT
Start: 2022-05-03

## 2022-05-03 NOTE — TELEPHONE ENCOUNTER
"Pending Prescriptions:                       Disp   Refills    FLUoxetine (PROZAC) 20 MG capsule [Pharmac*90 cap*0        Sig: TAKE 3 CAPSULES BY MOUTH EVERY DAY  - needs follow-up           appointment for future refills.    Routing refill request to provider for review/approval because:  Naomi given x1 and patient did not follow up, please advise    Requested Prescriptions   Pending Prescriptions Disp Refills    FLUoxetine (PROZAC) 20 MG capsule [Pharmacy Med Name: FLUOXETINE HCL 20 MG CAPSULE] 90 capsule 0     Sig: TAKE 3 CAPSULES BY MOUTH EVERY DAY  - needs follow-up appointment for future refills.        SSRIs Protocol Passed - 4/30/2022  9:51 AM        Passed - Recent (12 mo) or future (30 days) visit within the authorizing provider's specialty     Patient has had an office visit with the authorizing provider or a provider within the authorizing providers department within the previous 12 mos or has a future within next 30 days. See \"Patient Info\" tab in inbasket, or \"Choose Columns\" in Meds & Orders section of the refill encounter.              Passed - Medication is active on med list        Passed - Patient is age 18 or older                    "

## 2022-05-07 ENCOUNTER — HEALTH MAINTENANCE LETTER (OUTPATIENT)
Age: 42
End: 2022-05-07

## 2022-06-01 DIAGNOSIS — F41.1 GAD (GENERALIZED ANXIETY DISORDER): Chronic | ICD-10-CM

## 2022-07-02 ENCOUNTER — HEALTH MAINTENANCE LETTER (OUTPATIENT)
Age: 42
End: 2022-07-02

## 2022-07-03 DIAGNOSIS — F41.1 GAD (GENERALIZED ANXIETY DISORDER): Chronic | ICD-10-CM

## 2022-07-04 DIAGNOSIS — E11.59 TYPE 2 DIABETES MELLITUS WITH OTHER CIRCULATORY COMPLICATION, WITHOUT LONG-TERM CURRENT USE OF INSULIN (H): ICD-10-CM

## 2022-07-05 NOTE — TELEPHONE ENCOUNTER
"Pending Prescriptions:                       Disp   Refills    FLUoxetine (PROZAC) 20 MG capsule [Pharmac*90 cap*0        Sig: TAKE 3 CAPSULES BY MOUTH EVERY DAY - NEEDS FOLLOW-UP           APPOINTMENT FOR FUTURE REFILLS.    Routing refill request to provider for review/approval because:  Drug interaction warning    Requested Prescriptions   Pending Prescriptions Disp Refills    FLUoxetine (PROZAC) 20 MG capsule [Pharmacy Med Name: FLUOXETINE HCL 20 MG CAPSULE] 90 capsule 0     Sig: TAKE 3 CAPSULES BY MOUTH EVERY DAY - NEEDS FOLLOW-UP APPOINTMENT FOR FUTURE REFILLS.        SSRIs Protocol Passed - 7/3/2022  9:19 AM        Passed - Recent (12 mo) or future (30 days) visit within the authorizing provider's specialty     Patient has had an office visit with the authorizing provider or a provider within the authorizing providers department within the previous 12 mos or has a future within next 30 days. See \"Patient Info\" tab in inbasket, or \"Choose Columns\" in Meds & Orders section of the refill encounter.              Passed - Medication is active on med list        Passed - Patient is age 18 or older              "

## 2022-07-05 NOTE — TELEPHONE ENCOUNTER
"Pending Prescriptions:                       Disp   Refills    metFORMIN (GLUCOPHAGE XR) 500 MG 24 hr tab*180 ta*0        Sig: TAKE 2 TABLETS (1,000 MG) BY MOUTH ONCE DAILY (WITH           DINNER)    Routing refill request to provider for review/approval because:  Labs not current:  A1c    Requested Prescriptions   Pending Prescriptions Disp Refills    metFORMIN (GLUCOPHAGE XR) 500 MG 24 hr tablet [Pharmacy Med Name: METFORMIN HCL  MG TABLET] 180 tablet 0     Sig: TAKE 2 TABLETS (1,000 MG) BY MOUTH ONCE DAILY (WITH DINNER)        Biguanide Agents Failed - 7/4/2022 12:34 AM        Failed - Patient has documented A1c within the specified period of time.     If HgbA1C is 8 or greater, it needs to be on file within the past 3 months.  If less than 8, must be on file within the past 6 months.     Recent Labs   Lab Test 11/19/21  0803   A1C 9.0*             Passed - Patient is age 10 or older        Passed - Patient's CR is NOT>1.4 OR Patient's EGFR is NOT<45 within past 12 mos.       Recent Labs   Lab Test 11/19/21 0803 12/04/20  1034   GFRESTIMATED >90 >90   GFRESTBLACK  --  >90       Recent Labs   Lab Test 11/19/21  0803   CR 0.87             Passed - Patient does NOT have a diagnosis of CHF.        Passed - Medication is active on med list        Passed - Recent (6 mo) or future (30 days) visit within the authorizing provider's specialty     Patient had office visit in the last 6 months or has a visit in the next 30 days with authorizing provider or within the authorizing provider's specialty.  See \"Patient Info\" tab in inbasket, or \"Choose Columns\" in Meds & Orders section of the refill encounter.                  "

## 2022-07-06 RX ORDER — METFORMIN HCL 500 MG
TABLET, EXTENDED RELEASE 24 HR ORAL
Qty: 60 TABLET | Refills: 0 | Status: SHIPPED | OUTPATIENT
Start: 2022-07-06 | End: 2022-07-21

## 2022-07-16 DIAGNOSIS — F41.1 GAD (GENERALIZED ANXIETY DISORDER): Chronic | ICD-10-CM

## 2022-07-19 NOTE — TELEPHONE ENCOUNTER
"Pending Prescriptions:                       Disp   Refills    FLUoxetine (PROZAC) 20 MG capsule [Pharmac*45 cap*0        Sig: TAKE 3 CAPSULES BY MOUTH EVERY DAY - NEEDS FOLLOW-UP           APPOINTMENT FOR FUTURE REFILLS.    Routing refill request to provider for review/approval because:  Drug interaction warning  Requested Prescriptions   Pending Prescriptions Disp Refills    FLUoxetine (PROZAC) 20 MG capsule [Pharmacy Med Name: FLUOXETINE HCL 20 MG CAPSULE] 45 capsule 0     Sig: TAKE 3 CAPSULES BY MOUTH EVERY DAY - NEEDS FOLLOW-UP APPOINTMENT FOR FUTURE REFILLS.        SSRIs Protocol Passed - 7/16/2022 10:41 AM        Passed - Recent (12 mo) or future (30 days) visit within the authorizing provider's specialty     Patient has had an office visit with the authorizing provider or a provider within the authorizing providers department within the previous 12 mos or has a future within next 30 days. See \"Patient Info\" tab in inbasket, or \"Choose Columns\" in Meds & Orders section of the refill encounter.              Passed - Medication is active on med list        Passed - Patient is age 18 or older                    "

## 2022-07-21 ENCOUNTER — VIRTUAL VISIT (OUTPATIENT)
Dept: FAMILY MEDICINE | Facility: CLINIC | Age: 42
End: 2022-07-21
Payer: COMMERCIAL

## 2022-07-21 DIAGNOSIS — E11.59 TYPE 2 DIABETES MELLITUS WITH OTHER CIRCULATORY COMPLICATION, WITHOUT LONG-TERM CURRENT USE OF INSULIN (H): ICD-10-CM

## 2022-07-21 DIAGNOSIS — F41.1 GAD (GENERALIZED ANXIETY DISORDER): Chronic | ICD-10-CM

## 2022-07-21 DIAGNOSIS — G47.33 OSA (OBSTRUCTIVE SLEEP APNEA): Primary | ICD-10-CM

## 2022-07-21 PROCEDURE — 99214 OFFICE O/P EST MOD 30 MIN: CPT | Mod: GT | Performed by: NURSE PRACTITIONER

## 2022-07-21 RX ORDER — METFORMIN HCL 500 MG
TABLET, EXTENDED RELEASE 24 HR ORAL
Qty: 60 TABLET | Refills: 0 | Status: SHIPPED | OUTPATIENT
Start: 2022-07-21 | End: 2022-08-03

## 2022-07-21 RX ORDER — DULAGLUTIDE 0.75 MG/.5ML
0.75 INJECTION, SOLUTION SUBCUTANEOUS
Qty: 2 ML | Refills: 1 | Status: SHIPPED | OUTPATIENT
Start: 2022-07-21 | End: 2022-08-18

## 2022-07-21 ASSESSMENT — ASTHMA QUESTIONNAIRES
QUESTION_5 LAST FOUR WEEKS HOW WOULD YOU RATE YOUR ASTHMA CONTROL: COMPLETELY CONTROLLED
ACT_TOTALSCORE: 25
ACT_TOTALSCORE: 25
QUESTION_2 LAST FOUR WEEKS HOW OFTEN HAVE YOU HAD SHORTNESS OF BREATH: NOT AT ALL
QUESTION_1 LAST FOUR WEEKS HOW MUCH OF THE TIME DID YOUR ASTHMA KEEP YOU FROM GETTING AS MUCH DONE AT WORK, SCHOOL OR AT HOME: NONE OF THE TIME
QUESTION_4 LAST FOUR WEEKS HOW OFTEN HAVE YOU USED YOUR RESCUE INHALER OR NEBULIZER MEDICATION (SUCH AS ALBUTEROL): NOT AT ALL
QUESTION_3 LAST FOUR WEEKS HOW OFTEN DID YOUR ASTHMA SYMPTOMS (WHEEZING, COUGHING, SHORTNESS OF BREATH, CHEST TIGHTNESS OR PAIN) WAKE YOU UP AT NIGHT OR EARLIER THAN USUAL IN THE MORNING: NOT AT ALL

## 2022-07-21 NOTE — PROGRESS NOTES
"Josué is a 42 year old who is being evaluated via a billable video visit.      How would you like to obtain your AVS? MyChart  If the video visit is dropped, the invitation should be resent by: Text to cell phone: [phone]  Will anyone else be joining your video visit? No        Assessment & Plan     (G47.33) ADRIANNA (obstructive sleep apnea)  (primary encounter diagnosis)  Comment:   Plan: Adult Sleep Eval & Management          Referral        Pt. Feels uncontrolled, sleep follow-up advised.     (E11.59) Type 2 diabetes mellitus with other circulatory complication, without long-term current use of insulin (H)  Comment: improved  Plan: HEMOGLOBIN A1C, dulaglutide (TRULICITY) 0.75         MG/0.5ML pen, CBC with Platelets &         Differential, Comprehensive metabolic panel,         metFORMIN (GLUCOPHAGE XR) 500 MG 24 hr tablet        Due for labs. Exercise encouraged. Continue meds, looking at labs for medication assessment. Continue weight loss efforts.       (F41.1) KRISTINA (generalized anxiety disorder)  Comment: pt. States controlled  Plan: FLUoxetine (PROZAC) 20 MG capsule        Updating PHQ-9/GAD7    Asthma- controlled per pt.                BMI:   Estimated body mass index is 45.35 kg/m  as calculated from the following:    Height as of 11/19/21: 1.88 m (6' 2.02\").    Weight as of 11/22/21: 160.3 kg (353 lb 6.4 oz).   Weight management plan: Discussed healthy diet and exercise guidelines    MEDICATIONS:  Continue current medications without change    Return in about 3 days (around 7/24/2022) for Lab Work.    LAWSON Hernandez [facility] RINA Moses is a 42 year old, presenting for the following health issues:  Recheck Medication    Cpap- seems off. Drowsy driving after 20 min.   ? If working.     Weight is around 340. Needs Meds refilled.     HPI     Discuss both Trulicity and Fluoxitine because I am out and the pharmacy won't refill them for me.     Hello- fresh " ,portion control not as good with exercise     BG- range 120 post prandial.   Fasting- 130-140    Review of Systems   Constitutional, HEENT, cardiovascular, pulmonary, gi and gu systems are negative, except as otherwise noted.      Objective           Vitals:  No vitals were obtained today due to virtual visit.    Physical Exam   GENERAL: Healthy, alert and no distress  EYES: Eyes grossly normal to inspection.  No discharge or erythema, or obvious scleral/conjunctival abnormalities.  RESP: No audible wheeze, cough, or visible cyanosis.  No visible retractions or increased work of breathing.    SKIN: Visible skin clear. No significant rash, abnormal pigmentation or lesions.  NEURO: Cranial nerves grossly intact.  Mentation and speech appropriate for age.  PSYCH: Mentation appears normal, affect normal/bright, judgement and insight intact, normal speech and appearance well-groomed.                Video-Visit Details    Video Start Time: 1634    Type of service:  Video Visit    Video End Time:4:49 PM    Originating Location (pt. Location): Home    Distant Location (provider location):  [facility]     Platform used for Video Visit: [employer]    Jenny Jean

## 2022-08-18 ENCOUNTER — LAB (OUTPATIENT)
Dept: LAB | Facility: CLINIC | Age: 42
End: 2022-08-18
Payer: COMMERCIAL

## 2022-08-18 ENCOUNTER — MYC MEDICAL ADVICE (OUTPATIENT)
Dept: FAMILY MEDICINE | Facility: CLINIC | Age: 42
End: 2022-08-18

## 2022-08-18 DIAGNOSIS — E11.59 TYPE 2 DIABETES MELLITUS WITH OTHER CIRCULATORY COMPLICATION, WITHOUT LONG-TERM CURRENT USE OF INSULIN (H): ICD-10-CM

## 2022-08-18 LAB
ALBUMIN SERPL-MCNC: 3.3 G/DL (ref 3.4–5)
ALP SERPL-CCNC: 113 U/L (ref 40–150)
ALT SERPL W P-5'-P-CCNC: 42 U/L (ref 0–70)
ANION GAP SERPL CALCULATED.3IONS-SCNC: 6 MMOL/L (ref 3–14)
AST SERPL W P-5'-P-CCNC: 13 U/L (ref 0–45)
BASOPHILS # BLD AUTO: 0.1 10E3/UL (ref 0–0.2)
BASOPHILS NFR BLD AUTO: 1 %
BILIRUB SERPL-MCNC: 0.3 MG/DL (ref 0.2–1.3)
BUN SERPL-MCNC: 14 MG/DL (ref 7–30)
CALCIUM SERPL-MCNC: 8.7 MG/DL (ref 8.5–10.1)
CHLORIDE BLD-SCNC: 108 MMOL/L (ref 94–109)
CO2 SERPL-SCNC: 26 MMOL/L (ref 20–32)
CREAT SERPL-MCNC: 0.77 MG/DL (ref 0.52–1.25)
EOSINOPHIL # BLD AUTO: 0.4 10E3/UL (ref 0–0.7)
EOSINOPHIL NFR BLD AUTO: 5 %
ERYTHROCYTE [DISTWIDTH] IN BLOOD BY AUTOMATED COUNT: 13.1 % (ref 10–15)
GFR SERPL CREATININE-BSD FRML MDRD: >90 ML/MIN/1.73M2
GLUCOSE BLD-MCNC: 183 MG/DL (ref 70–99)
HBA1C MFR BLD: 9.6 % (ref 0–5.6)
HCT VFR BLD AUTO: 40.8 % (ref 35–53)
HGB BLD-MCNC: 13.9 G/DL (ref 11.7–17.7)
IMM GRANULOCYTES # BLD: 0 10E3/UL
IMM GRANULOCYTES NFR BLD: 0 %
LYMPHOCYTES # BLD AUTO: 2.4 10E3/UL (ref 0.8–5.3)
LYMPHOCYTES NFR BLD AUTO: 28 %
MCH RBC QN AUTO: 30.3 PG (ref 26.5–33)
MCHC RBC AUTO-ENTMCNC: 34.1 G/DL (ref 31.5–36.5)
MCV RBC AUTO: 89 FL (ref 78–100)
MONOCYTES # BLD AUTO: 0.6 10E3/UL (ref 0–1.3)
MONOCYTES NFR BLD AUTO: 7 %
NEUTROPHILS # BLD AUTO: 5 10E3/UL (ref 1.6–8.3)
NEUTROPHILS NFR BLD AUTO: 59 %
NRBC # BLD AUTO: 0 10E3/UL
NRBC BLD AUTO-RTO: 0 /100
PLATELET # BLD AUTO: 269 10E3/UL (ref 150–450)
POTASSIUM BLD-SCNC: 4.2 MMOL/L (ref 3.4–5.3)
PROT SERPL-MCNC: 7.2 G/DL (ref 6.8–8.8)
RBC # BLD AUTO: 4.58 10E6/UL (ref 3.8–5.9)
SODIUM SERPL-SCNC: 140 MMOL/L (ref 133–144)
WBC # BLD AUTO: 8.4 10E3/UL (ref 4–11)

## 2022-08-18 PROCEDURE — 36415 COLL VENOUS BLD VENIPUNCTURE: CPT

## 2022-08-18 PROCEDURE — 85025 COMPLETE CBC W/AUTO DIFF WBC: CPT

## 2022-08-18 PROCEDURE — 80053 COMPREHEN METABOLIC PANEL: CPT

## 2022-08-18 PROCEDURE — 83036 HEMOGLOBIN GLYCOSYLATED A1C: CPT

## 2022-08-18 RX ORDER — DULAGLUTIDE 0.75 MG/.5ML
0.75 INJECTION, SOLUTION SUBCUTANEOUS
Qty: 2 ML | Refills: 1 | Status: SHIPPED | OUTPATIENT
Start: 2022-08-18 | End: 2023-07-06

## 2022-09-02 DIAGNOSIS — E11.59 TYPE 2 DIABETES MELLITUS WITH OTHER CIRCULATORY COMPLICATION, WITHOUT LONG-TERM CURRENT USE OF INSULIN (H): ICD-10-CM

## 2022-09-07 RX ORDER — METFORMIN HCL 500 MG
TABLET, EXTENDED RELEASE 24 HR ORAL
Qty: 60 TABLET | Refills: 0 | Status: SHIPPED | OUTPATIENT
Start: 2022-09-07 | End: 2022-11-18

## 2022-09-07 NOTE — TELEPHONE ENCOUNTER
Pending Prescriptions:                       Disp   Refills    metFORMIN (GLUCOPHAGE XR) 500 MG 24 hr ta*60 tab*0            Sig: TAKE 2 TABLETS (1,000 MG) BY MOUTH ONCE DAILY           (WITH DINNER)    Medication is being filled for 1 time narayan refill only due to:  Patient is due for diabetes follow-up with provider    Please call and help schedule.  Thank you!

## 2022-10-21 DIAGNOSIS — F41.1 GAD (GENERALIZED ANXIETY DISORDER): Chronic | ICD-10-CM

## 2022-11-18 ENCOUNTER — VIRTUAL VISIT (OUTPATIENT)
Dept: FAMILY MEDICINE | Facility: CLINIC | Age: 42
End: 2022-11-18
Payer: COMMERCIAL

## 2022-11-18 DIAGNOSIS — I10 ESSENTIAL HYPERTENSION: ICD-10-CM

## 2022-11-18 DIAGNOSIS — F41.1 GAD (GENERALIZED ANXIETY DISORDER): Chronic | ICD-10-CM

## 2022-11-18 DIAGNOSIS — E11.59 TYPE 2 DIABETES MELLITUS WITH OTHER CIRCULATORY COMPLICATION, WITHOUT LONG-TERM CURRENT USE OF INSULIN (H): ICD-10-CM

## 2022-11-18 PROBLEM — Z91.148 NON COMPLIANCE W MEDICATION REGIMEN: Status: RESOLVED | Noted: 2019-03-14 | Resolved: 2022-11-18

## 2022-11-18 PROCEDURE — 99214 OFFICE O/P EST MOD 30 MIN: CPT | Mod: GT | Performed by: FAMILY MEDICINE

## 2022-11-18 RX ORDER — METFORMIN HCL 500 MG
TABLET, EXTENDED RELEASE 24 HR ORAL
Qty: 180 TABLET | Refills: 3 | Status: SHIPPED | OUTPATIENT
Start: 2022-11-18 | End: 2023-01-04

## 2022-11-18 RX ORDER — LORAZEPAM 1 MG/1
1 TABLET ORAL DAILY PRN
Qty: 20 TABLET | Refills: 0 | Status: SHIPPED | OUTPATIENT
Start: 2022-11-18 | End: 2024-01-09

## 2022-11-18 RX ORDER — METOPROLOL SUCCINATE 50 MG/1
50 TABLET, EXTENDED RELEASE ORAL DAILY
Qty: 90 TABLET | Refills: 3 | Status: SHIPPED | OUTPATIENT
Start: 2022-11-18 | End: 2023-12-01

## 2022-11-18 NOTE — PROGRESS NOTES
Josué is a 42 year old who is being evaluated via a billable video visit.      How would you like to obtain your AVS? MyChart  If the video visit is dropped, the invitation should be resent by: Text to cell phone: [phone]  Will anyone else be joining your video visit? No      Assessment & Plan   Type 2 diabetes mellitus with other circulatory complication, without long-term current use of insulin (H)  Uncontrolled, will increase metformin to 1500 mg for a week or 2 and then ultimately to 2000 mg daily.  Also increased Trulicity to 1.5 mg weekly  - Albumin Random Urine Quantitative with Creat Ratio  - metFORMIN (GLUCOPHAGE XR) 500 MG 24 hr tablet  Dispense: 180 tablet; Refill: 3  - dulaglutide (TRULICITY) 1.5 MG/0.5ML pen  Dispense: 6 mL; Refill: 3  - Hemoglobin A1c    Essential hypertension  Controlled - continue medication(s).  - metoprolol succinate ER (TOPROL XL) 50 MG 24 hr tablet  Dispense: 90 tablet; Refill: 3    KRISTINA (generalized anxiety disorder)  Stable and has occasional panic which the lorazepam is helpful for her.  We will continue with fluoxetine 60 mg daily  - FLUoxetine (PROZAC) 20 MG capsule  Dispense: 270 capsule; Refill: 3  - LORazepam (ATIVAN) 1 MG tablet  Dispense: 20 tablet; Refill: 0    Return in about 1 week (around 11/25/2022) for lab only appointment.      Max Maravilla MD      [address] [address]  [email or web address]   Office: [phone]       Subjective   Josué is a 42 year old accompanied by Josué Franco's self, presenting for the following health issues:  Recheck Medication      HPI      Diabetes Follow-up     How often are you checking your blood sugar? Two times daily  Blood sugar testing frequency justification:  Uncontrolled diabetes  What time of day are you checking your blood sugars (select all that apply)?  two times  Have you had any blood sugars above 200?  No  Have you had any blood sugars below 70?  No    What  symptoms do you notice when your blood sugar is low?  None    What concerns do you have today about your diabetes? None     Do you have any of these symptoms? (Select all that apply)  No numbness or tingling in feet.  No redness, sores or blisters on feet.  No complaints of excessive thirst.  No reports of blurry vision.  No significant changes to weight.    Have you had a diabetic eye exam in the last 12 months? Yes-  Location: 4/15/22      Have you had a diabetic eye exam in the last 12 months? Yes- Date of last eye exam: 4/15/22,  Location: [facility]      BP Readings from Last 2 Encounters:   11/22/21 115/58   11/19/21 132/86     Hemoglobin A1C (%)   Date Value   08/18/2022 9.6 (H)   11/19/2021 9.0 (H)   12/04/2020 7.1 (H)   01/24/2020 7.4 (H)     LDL Cholesterol Calculated (mg/dL)   Date Value   11/19/2021 62   12/04/2020 69   05/03/2019 64         Hyperlipidemia Follow-Up      Are you regularly taking any medication or supplement to lower your cholesterol?   No    Are you having muscle aches or other side effects that you think could be caused by your cholesterol lowering medication?  No      How many servings of fruits and vegetables do you eat daily?  2-3    On average, how many sweetened beverages do you drink each day (Examples: soda, juice, sweet tea, etc.  Do NOT count diet or artificially sweetened beverages)?   0    How many days per week do you exercise enough to make your heart beat faster? 3 or less    How many minutes a day do you exercise enough to make your heart beat faster? 20 - 29    How many days per week do you miss taking your medication? 0      Review of Systems         Objective           Vitals:  No vitals were obtained today due to virtual visit.    Physical Exam   GENERAL: Healthy, alert and no distress  EYES: Eyes grossly normal to inspection.  No discharge or erythema, or obvious scleral/conjunctival abnormalities.  RESP: No audible wheeze, cough, or visible cyanosis.  No  visible retractions or increased work of breathing.    SKIN: Visible skin clear. No significant rash, abnormal pigmentation or lesions.  NEURO: Cranial nerves grossly intact.  Mentation and speech appropriate for age.  PSYCH: Mentation appears normal, affect normal/bright, judgement and insight intact, normal speech and appearance well-groomed.      Lab Results   Component Value Date    A1C 9.6 08/18/2022    A1C 9.0 11/19/2021    A1C 7.1 12/04/2020    A1C 7.4 01/24/2020    A1C 7.7 10/24/2019    A1C 7.1 05/03/2019    A1C 7.6 02/21/2019       Video-Visit Details    Video Start Time: 2:17 PM    Type of service:  Video Visit    Video End Time:2:28 PM    Originating Location (pt. Location): Home    Distant Location (provider location):  On-site    Platform used for Video Visit: [employer]

## 2022-12-07 ENCOUNTER — TELEPHONE (OUTPATIENT)
Dept: PHARMACY | Facility: CLINIC | Age: 42
End: 2022-12-07

## 2022-12-07 NOTE — TELEPHONE ENCOUNTER
This patient is due for MTM follow-up. I called the patient to schedule an appointment and left a message with the clinic phone number for the patient to call to schedule.    Nico Garcia, DonnaD, [facility]  Medication Therapy Management Pharmacist  Pager: [phone]

## 2022-12-15 ENCOUNTER — MYC MEDICAL ADVICE (OUTPATIENT)
Dept: FAMILY MEDICINE | Facility: CLINIC | Age: 42
End: 2022-12-15

## 2022-12-15 NOTE — TELEPHONE ENCOUNTER
Patient Quality Outreach    Patient is due for the following:   Diabetes -  A1C, LDL (Fasting) and Microalbumin  Asthma  -  AAP  Physical Preventive Adult Physical      Topic Date Due     Pneumococcal Vaccine (2 - PCV) 01/24/2021     COVID-19 Vaccine (4 - Booster for Pfizer series) 02/18/2022     Flu Vaccine (1) 09/01/2022       Next Steps:   Schedule a lab only visit for fasting labs per Dr Maravilla and an Adult Preventative    Type of outreach:    Sent [employer] message.      Questions for provider review:    AAP-route to Belkis Chadwick CNP after last outreach for her to complete AAP.     Deana Juarez, [facility]  Chart routed to Care Team.

## 2022-12-30 DIAGNOSIS — E11.59 TYPE 2 DIABETES MELLITUS WITH OTHER CIRCULATORY COMPLICATION, WITHOUT LONG-TERM CURRENT USE OF INSULIN (H): ICD-10-CM

## 2023-01-03 NOTE — TELEPHONE ENCOUNTER
Routing refill request to provider for review/approval because:  Labs not current:  A1C    Pt was to follow up in 1 week from November appt for lab only and has not done so  Lucy KERNS RN, BSN

## 2023-01-04 RX ORDER — METFORMIN HCL 500 MG
2000 TABLET, EXTENDED RELEASE 24 HR ORAL
Qty: 60 TABLET | Refills: 0 | Status: SHIPPED | OUTPATIENT
Start: 2023-01-04 | End: 2023-01-26

## 2023-01-23 DIAGNOSIS — E11.59 TYPE 2 DIABETES MELLITUS WITH OTHER CIRCULATORY COMPLICATION, WITHOUT LONG-TERM CURRENT USE OF INSULIN (H): ICD-10-CM

## 2023-01-26 RX ORDER — METFORMIN HCL 500 MG
2000 TABLET, EXTENDED RELEASE 24 HR ORAL
Qty: 360 TABLET | Refills: 0 | Status: SHIPPED | OUTPATIENT
Start: 2023-01-26 | End: 2023-06-26

## 2023-01-26 NOTE — TELEPHONE ENCOUNTER
Pending Prescriptions:                       Disp   Refills    metFORMIN (GLUCOPHAGE XR) 500 MG 24 hr ta*360 ta*0            Sig: Take 4 tablets (2,000 mg) by mouth daily (with           dinner)    Medication is being filled for 1 time narayan refill only due to:  Patient is due for follow up    Please call and help schedule.  Thank you!      Vanessa Pina RN on 1/26/2023 at 10:00 AM

## 2023-02-01 ENCOUNTER — TRANSFERRED RECORDS (OUTPATIENT)
Dept: MULTI SPECIALTY CLINIC | Facility: CLINIC | Age: 43
End: 2023-02-01

## 2023-02-01 LAB — RETINOPATHY: NORMAL

## 2023-04-23 ENCOUNTER — HEALTH MAINTENANCE LETTER (OUTPATIENT)
Age: 43
End: 2023-04-23

## 2023-05-04 DIAGNOSIS — E11.59 TYPE 2 DIABETES MELLITUS WITH OTHER CIRCULATORY COMPLICATION, WITHOUT LONG-TERM CURRENT USE OF INSULIN (H): ICD-10-CM

## 2023-05-04 RX ORDER — METFORMIN HCL 500 MG
TABLET, EXTENDED RELEASE 24 HR ORAL
Qty: 360 TABLET | Refills: 0 | OUTPATIENT
Start: 2023-05-04

## 2023-05-04 NOTE — TELEPHONE ENCOUNTER
"Pending Prescriptions:                       Disp   Refills    metFORMIN (GLUCOPHAGE XR) 500 MG 24 hr tab*360 ta*0        Sig: TAKE 4 TABLETS BY MOUTH DAILY WITH DINNER    Routing refill request to provider for review/approval because:  Requested Prescriptions   Pending Prescriptions Disp Refills    metFORMIN (GLUCOPHAGE XR) 500 MG 24 hr tablet [Pharmacy Med Name: METFORMIN HCL  MG TABLET] 360 tablet 0     Sig: TAKE 4 TABLETS BY MOUTH DAILY WITH DINNER       Biguanide Agents Failed - 5/4/2023  3:10 AM        Failed - Patient has documented A1c within the specified period of time.     If HgbA1C is 8 or greater, it needs to be on file within the past 3 months.  If less than 8, must be on file within the past 6 months.     Recent Labs   Lab Test 08/18/22  0745   A1C 9.6*             Failed - Recent (6 mo) or future (30 days) visit within the authorizing provider's specialty     Patient had office visit in the last 6 months or has a visit in the next 30 days with authorizing provider or within the authorizing provider's specialty.  See \"Patient Info\" tab in inbasket, or \"Choose Columns\" in Meds & Orders section of the refill encounter.            Passed - Patient is age 10 or older        Passed - Patient's CR is NOT>1.4 OR Patient's EGFR is NOT<45 within past 12 mos.     Recent Labs   Lab Test 08/18/22  0745 11/19/21  0803 12/04/20  1034   GFRESTIMATED >90   < > >90   GFRESTBLACK  --   --  >90    < > = values in this interval not displayed.       Recent Labs   Lab Test 08/18/22  0745   CR 0.77             Passed - Patient does NOT have a diagnosis of CHF.        Passed - Medication is active on med list           metFORMIN (GLUCOPHAGE XR) 500 MG 24 hr tablet 360 tablet 0 1/26/2023  No   Sig - Route: Take 4 tablets (2,000 mg) by mouth daily (with dinner) - Oral   Sent to pharmacy as: metFORMIN HCl  MG Oral Tablet Extended Release 24 Hour (GLUCOPHAGE XR)   Class: E-Prescribe   Notes to Pharmacy: Due for " follow up   Order: [id number]   E-Prescribing Status: Receipt confirmed by pharmacy (1/26/2023 10:00 AM CST)     Bhavya Mcelroy RN on 5/4/2023 at 11:13 AM

## 2023-05-24 ENCOUNTER — APPOINTMENT (OUTPATIENT)
Dept: CT IMAGING | Facility: CLINIC | Age: 43
End: 2023-05-24
Attending: EMERGENCY MEDICINE
Payer: COMMERCIAL

## 2023-05-24 ENCOUNTER — HOSPITAL ENCOUNTER (EMERGENCY)
Facility: CLINIC | Age: 43
Discharge: HOME | End: 2023-05-24
Attending: PHYSICIAN ASSISTANT | Admitting: PHYSICIAN ASSISTANT
Payer: COMMERCIAL

## 2023-05-24 ENCOUNTER — OFFICE VISIT (OUTPATIENT)
Dept: URGENT CARE | Facility: URGENT CARE | Age: 43
End: 2023-05-24
Payer: COMMERCIAL

## 2023-05-24 VITALS
SYSTOLIC BLOOD PRESSURE: 134 MMHG | TEMPERATURE: 98.3 F | DIASTOLIC BLOOD PRESSURE: 90 MMHG | RESPIRATION RATE: 16 BRPM | OXYGEN SATURATION: 98 % | HEART RATE: 77 BPM | BODY MASS INDEX: 44.73 KG/M2 | WEIGHT: 315 LBS

## 2023-05-24 VITALS
RESPIRATION RATE: 18 BRPM | TEMPERATURE: 98.4 F | HEART RATE: 85 BPM | DIASTOLIC BLOOD PRESSURE: 79 MMHG | SYSTOLIC BLOOD PRESSURE: 135 MMHG | OXYGEN SATURATION: 98 %

## 2023-05-24 DIAGNOSIS — K52.9 GASTROENTERITIS: ICD-10-CM

## 2023-05-24 DIAGNOSIS — R10.12 LUQ ABDOMINAL PAIN: Primary | ICD-10-CM

## 2023-05-24 DIAGNOSIS — R10.13 ABDOMINAL PAIN, EPIGASTRIC: ICD-10-CM

## 2023-05-24 DIAGNOSIS — Z90.49 HISTORY OF CHOLECYSTECTOMY: ICD-10-CM

## 2023-05-24 DIAGNOSIS — E66.01 MORBID OBESITY WITH BMI OF 45.0-49.9, ADULT (H): ICD-10-CM

## 2023-05-24 DIAGNOSIS — Z98.890 HISTORY OF NISSEN FUNDOPLICATION: ICD-10-CM

## 2023-05-24 DIAGNOSIS — E11.59 TYPE 2 DIABETES MELLITUS WITH OTHER CIRCULATORY COMPLICATION, WITHOUT LONG-TERM CURRENT USE OF INSULIN (H): ICD-10-CM

## 2023-05-24 LAB
ALBUMIN SERPL BCG-MCNC: 4.1 G/DL (ref 3.5–5.2)
ALBUMIN UR-MCNC: 20 MG/DL
ALP SERPL-CCNC: 101 U/L (ref 35–129)
ALT SERPL W P-5'-P-CCNC: 44 U/L (ref 10–50)
ANION GAP SERPL CALCULATED.3IONS-SCNC: 13 MMOL/L (ref 7–15)
APPEARANCE UR: CLEAR
AST SERPL W P-5'-P-CCNC: 25 U/L (ref 10–50)
ATRIAL RATE - MUSE: 85 BPM
BASOPHILS # BLD AUTO: 0 10E3/UL (ref 0–0.2)
BASOPHILS NFR BLD AUTO: 1 %
BILIRUB SERPL-MCNC: 0.3 MG/DL
BILIRUB UR QL STRIP: NEGATIVE
BUN SERPL-MCNC: 10.7 MG/DL (ref 6–20)
CALCIUM SERPL-MCNC: 9.1 MG/DL (ref 8.6–10)
CHLORIDE SERPL-SCNC: 97 MMOL/L (ref 98–107)
COLOR UR AUTO: YELLOW
CREAT SERPL-MCNC: 0.81 MG/DL (ref 0.51–1.17)
DEPRECATED HCO3 PLAS-SCNC: 25 MMOL/L (ref 22–29)
DIASTOLIC BLOOD PRESSURE - MUSE: NORMAL MMHG
EOSINOPHIL # BLD AUTO: 0.3 10E3/UL (ref 0–0.7)
EOSINOPHIL NFR BLD AUTO: 4 %
ERYTHROCYTE [DISTWIDTH] IN BLOOD BY AUTOMATED COUNT: 13.2 % (ref 10–15)
GFR SERPL CREATININE-BSD FRML MDRD: >90 ML/MIN/1.73M2
GLUCOSE SERPL-MCNC: 154 MG/DL (ref 70–99)
GLUCOSE UR STRIP-MCNC: NEGATIVE MG/DL
HCT VFR BLD AUTO: 42.1 % (ref 35–53)
HGB BLD-MCNC: 14.3 G/DL (ref 11.7–17.7)
HGB UR QL STRIP: NEGATIVE
HOLD SPECIMEN: NORMAL
HOLD SPECIMEN: NORMAL
IMM GRANULOCYTES # BLD: 0 10E3/UL
IMM GRANULOCYTES NFR BLD: 0 %
INTERPRETATION ECG - MUSE: NORMAL
KETONES UR STRIP-MCNC: NEGATIVE MG/DL
LEUKOCYTE ESTERASE UR QL STRIP: NEGATIVE
LIPASE SERPL-CCNC: 32 U/L (ref 13–60)
LYMPHOCYTES # BLD AUTO: 2.5 10E3/UL (ref 0.8–5.3)
LYMPHOCYTES NFR BLD AUTO: 29 %
MCH RBC QN AUTO: 29.8 PG (ref 26.5–33)
MCHC RBC AUTO-ENTMCNC: 34 G/DL (ref 31.5–36.5)
MCV RBC AUTO: 88 FL (ref 78–100)
MONOCYTES # BLD AUTO: 0.6 10E3/UL (ref 0–1.3)
MONOCYTES NFR BLD AUTO: 7 %
MUCOUS THREADS #/AREA URNS LPF: PRESENT /LPF
NEUTROPHILS # BLD AUTO: 5.1 10E3/UL (ref 1.6–8.3)
NEUTROPHILS NFR BLD AUTO: 59 %
NITRATE UR QL: NEGATIVE
NRBC # BLD AUTO: 0 10E3/UL
NRBC BLD AUTO-RTO: 0 /100
P AXIS - MUSE: 29 DEGREES
PH UR STRIP: 5.5 [PH] (ref 5–7)
PLATELET # BLD AUTO: 302 10E3/UL (ref 150–450)
POTASSIUM SERPL-SCNC: 4 MMOL/L (ref 3.4–5.3)
PR INTERVAL - MUSE: 102 MS
PROT SERPL-MCNC: 7.3 G/DL (ref 6.4–8.3)
QRS DURATION - MUSE: 118 MS
QT - MUSE: 350 MS
QTC - MUSE: 416 MS
R AXIS - MUSE: -18 DEGREES
RBC # BLD AUTO: 4.8 10E6/UL (ref 3.8–5.9)
RBC URINE: 1 /HPF
SODIUM SERPL-SCNC: 135 MMOL/L (ref 136–145)
SP GR UR STRIP: 1.03 (ref 1–1.03)
SQUAMOUS EPITHELIAL: <1 /HPF
SYSTOLIC BLOOD PRESSURE - MUSE: NORMAL MMHG
T AXIS - MUSE: -21 DEGREES
TROPONIN T SERPL HS-MCNC: <6 NG/L
UROBILINOGEN UR STRIP-MCNC: NORMAL MG/DL
VENTRICULAR RATE- MUSE: 85 BPM
WBC # BLD AUTO: 8.5 10E3/UL (ref 4–11)
WBC URINE: 2 /HPF

## 2023-05-24 PROCEDURE — 99285 EMERGENCY DEPT VISIT HI MDM: CPT | Mod: 25

## 2023-05-24 PROCEDURE — 96374 THER/PROPH/DIAG INJ IV PUSH: CPT | Mod: 59

## 2023-05-24 PROCEDURE — 250N000011 HC RX IP 250 OP 636: Performed by: PHYSICIAN ASSISTANT

## 2023-05-24 PROCEDURE — 250N000011 HC RX IP 250 OP 636: Performed by: EMERGENCY MEDICINE

## 2023-05-24 PROCEDURE — 74177 CT ABD & PELVIS W/CONTRAST: CPT

## 2023-05-24 PROCEDURE — 80053 COMPREHEN METABOLIC PANEL: CPT | Performed by: EMERGENCY MEDICINE

## 2023-05-24 PROCEDURE — 99214 OFFICE O/P EST MOD 30 MIN: CPT | Performed by: FAMILY MEDICINE

## 2023-05-24 PROCEDURE — 84484 ASSAY OF TROPONIN QUANT: CPT | Performed by: EMERGENCY MEDICINE

## 2023-05-24 PROCEDURE — 93005 ELECTROCARDIOGRAM TRACING: CPT

## 2023-05-24 PROCEDURE — 85025 COMPLETE CBC W/AUTO DIFF WBC: CPT | Performed by: EMERGENCY MEDICINE

## 2023-05-24 PROCEDURE — 36415 COLL VENOUS BLD VENIPUNCTURE: CPT | Performed by: EMERGENCY MEDICINE

## 2023-05-24 PROCEDURE — 83690 ASSAY OF LIPASE: CPT | Performed by: EMERGENCY MEDICINE

## 2023-05-24 PROCEDURE — 81001 URINALYSIS AUTO W/SCOPE: CPT | Performed by: EMERGENCY MEDICINE

## 2023-05-24 PROCEDURE — 250N000009 HC RX 250: Performed by: EMERGENCY MEDICINE

## 2023-05-24 RX ORDER — OMEPRAZOLE 40 MG/1
40 CAPSULE, DELAYED RELEASE ORAL DAILY
Qty: 14 CAPSULE | Refills: 0 | Status: SHIPPED | OUTPATIENT
Start: 2023-05-24 | End: 2024-08-29

## 2023-05-24 RX ORDER — SUCRALFATE 1 G/1
1 TABLET ORAL 4 TIMES DAILY
Qty: 56 TABLET | Refills: 0 | Status: SHIPPED | OUTPATIENT
Start: 2023-05-24 | End: 2023-05-24

## 2023-05-24 RX ORDER — IOPAMIDOL 755 MG/ML
135 INJECTION, SOLUTION INTRAVASCULAR ONCE
Status: COMPLETED | OUTPATIENT
Start: 2023-05-24 | End: 2023-05-24

## 2023-05-24 RX ORDER — SUCRALFATE 1 G/1
1 TABLET ORAL 4 TIMES DAILY
Qty: 56 TABLET | Refills: 0 | Status: SHIPPED | OUTPATIENT
Start: 2023-05-24 | End: 2023-07-06

## 2023-05-24 RX ORDER — OMEPRAZOLE 40 MG/1
40 CAPSULE, DELAYED RELEASE ORAL DAILY
Qty: 14 CAPSULE | Refills: 0 | Status: SHIPPED | OUTPATIENT
Start: 2023-05-24 | End: 2023-05-24

## 2023-05-24 RX ORDER — ONDANSETRON 2 MG/ML
4 INJECTION INTRAMUSCULAR; INTRAVENOUS
Status: DISCONTINUED | OUTPATIENT
Start: 2023-05-24 | End: 2023-05-24 | Stop reason: HOSPADM

## 2023-05-24 RX ADMIN — FAMOTIDINE 40 MG: 10 INJECTION INTRAVENOUS at 18:24

## 2023-05-24 RX ADMIN — IOPAMIDOL 135 ML: 755 INJECTION, SOLUTION INTRAVENOUS at 16:10

## 2023-05-24 RX ADMIN — SODIUM CHLORIDE 79 ML: 9 INJECTION, SOLUTION INTRAVENOUS at 16:11

## 2023-05-24 ASSESSMENT — ACTIVITIES OF DAILY LIVING (ADL)
ADLS_ACUITY_SCORE: 35
ADLS_ACUITY_SCORE: 35

## 2023-05-24 NOTE — ED TRIAGE NOTES
Patient had abdominal surgery in 2001, hx hiatal hernia. Sunday N/VD begun, pain in upper abdomen.

## 2023-05-24 NOTE — ED NOTES
Tele-PIT/Intake Evaluation      Video-Visit Details    Type of service:  Video Visit    Video Start Time (time video started): 2:57 PM  Video End Time (time video stopped): 3:02 PM   Originating Location (pt. Location): [facility]  Distant Location (provider location):  [facility]  Mode of Communication:  Video Conference via [employer]  Patient verbally consented to [facility] televisit.    History:  Epigastric pain, nausea, and GERD for several days  Hx of nissen fundoplication    Exam:    Patient Vitals for the past 24 hrs:   BP Temp Temp src Pulse Resp SpO2   05/24/23 1457 (!) 154/98 98.4  F (36.9  C) Oral 84 18 98 %       Appropriate interventions for symptom management were initiated if applicable.  Appropriate diagnostic tests were initiated if indicated.    Important information for subsequent clinician:  CT AP, labs    I briefly evaluated the patient and developed an initial plan of care. I discussed this plan and explained that this brief interaction does not constitute a full evaluation. Patient/family understands that they should wait to be fully evaluated and discuss any test results with another clinician prior to leaving the hospital.     Ian Ellis MD  05/24/23 9141

## 2023-05-24 NOTE — ED PROVIDER NOTES
History     Chief Complaint:  Abdominal Pain     The history is provided by the patient.      Josué Franco is a 42 year old adult with history of intermittent epigastric pain and bouts of reflux S/P nissen fundoplication and cholecystectomy who presents to the ED alone for evaluation of abdominal pain. For the last 4 days, patient endorses abdominal bloating as well as epigastric and upper abdominal discomfort.  Patient reports he did have some left shoulder aching and left upper chest wall aching few days ago he attributed this to a fall on Saturday.  Not having any current chest pain.  Denies any upper extremity numbness or weakness.  No shortness of breath.  Denies any bright red blood or melena vomiting or diarrhea.  No fevers.  Patient does endorse for the last few months he has had short burst episodes of epigastric discomfort after eating.  Pain is reportedly worse after eating. He states pain is better now than on arrival to the ED. He also reports diarrhea and nausea. He states he cannot vomit due to his nissen. Pain does not radiate to his back. He does not use antacids. Patient denies any history of coronary artery disease or blood clots.  No significant family history of coronary artery disease.  Patient is type II diabetic has high blood pressure and obesity.  Denies any history of high cholesterol.  Recently visiting family lives in [place].    Independent Historian:   None - Patient Only    Review of External Notes:     Medications:    Trulicity  Prozac   Ativan  Metformin   Toprol XL  Aspirin not prescribed     Past Medical History:    Intermittent, persistent, asthma   Generalized anxiety disorder   Type 2 diabetes    Hypertension    Morbid obesity    Nephrolithiasis   ADRIANNA on CPAP   PONV   Hyperlipidemia   Testicular hypofunction    Past Surgical History:    Cholecystectomy   Cystourethroscopy with lithotripsy  Nissen fundoplication     Physical Exam     Patient Vitals for the past 24 hrs:    BP Temp Temp src Pulse Resp SpO2   05/24/23 1817 -- -- -- 85 18 --   05/24/23 1810 135/79 -- -- 85 -- --   05/24/23 1457 (!) 154/98 98.4  F (36.9  C) Oral 84 18 98 %      Physical Exam  Vitals and nursing note reviewed.   Constitutional:       Appearance: Josué Franco is not ill-appearing or diaphoretic.   Eyes:      General: No scleral icterus.     Conjunctiva/sclera: Conjunctivae normal.   Cardiovascular:      Rate and Rhythm: Normal rate and regular rhythm.      Pulses: Normal pulses.      Heart sounds: Normal heart sounds. No murmur heard.     No friction rub. No gallop.   Pulmonary:      Effort: Pulmonary effort is normal. No respiratory distress.      Breath sounds: Normal breath sounds. No stridor. No wheezing, rhonchi or rales.   Abdominal:      General: There is no distension.      Palpations: Abdomen is soft.      Tenderness: There is abdominal tenderness (Mild epigastric tenderness.). There is no right CVA tenderness or left CVA tenderness.   Musculoskeletal:      Right lower leg: No edema.      Left lower leg: No edema.   Skin:     General: Skin is warm.      Coloration: Skin is not jaundiced or pale.      Findings: No bruising.   Neurological:      Mental Status: Josué Franco is oriented to person, place, and time. Mental status is at baseline.      Sensory: No sensory deficit.      Motor: No weakness.   Psychiatric:         Mood and Affect: Mood normal.         Behavior: Behavior normal.         Thought Content: Thought content normal.           Emergency Department Course   ECG  ECG results from 05/24/23   EKG 12 lead     Value    Systolic Blood Pressure     Diastolic Blood Pressure     Ventricular Rate 85    Atrial Rate 85    CT Interval 102    QRS Duration 118        QTc 416    P Axis 29    R AXIS -18    T Axis -21    Interpretation ECG      Sinus rhythm with short CT  Left ventricular hypertrophy with QRS widening ( R in aVL , [place] product )  Abnormal ECG  Compared to EKG dated  11/19/21, TWI was in old lead 3, new AVF-rereviewed by Dr. Jesse Licea      Imaging:  CT Abdomen Pelvis w Contrast   Final Result   IMPRESSION:    1.  Mild hepatosplenomegaly.         Report per radiology    Laboratory:  Labs Ordered and Resulted from Time of ED Arrival to Time of ED Departure   COMPREHENSIVE METABOLIC PANEL - Abnormal       Result Value    Sodium 135 (*)     Potassium 4.0      Chloride 97 (*)     Carbon Dioxide (CO2) 25      Anion Gap 13      Urea Nitrogen 10.7      Creatinine 0.81      Calcium 9.1      Glucose 154 (*)     Alkaline Phosphatase 101      AST 25      ALT 44      Protein Total 7.3      Albumin 4.1      Bilirubin Total 0.3      GFR Estimate >90     ROUTINE UA WITH MICROSCOPIC - Abnormal    Color Urine Yellow      Appearance Urine Clear      Glucose Urine Negative      Bilirubin Urine Negative      Ketones Urine Negative      Specific Gravity Urine 1.027      Blood Urine Negative      pH Urine 5.5      Protein Albumin Urine 20 (*)     Urobilinogen Urine Normal      Nitrite Urine Negative      Leukocyte Esterase Urine Negative      Mucus Urine Present (*)     RBC Urine 1      WBC Urine 2      Squamous Epithelials Urine <1     LIPASE - Normal    Lipase 32     TROPONIN T, HIGH SENSITIVITY - Normal    Troponin T, High Sensitivity <6     CBC WITH PLATELETS AND DIFFERENTIAL    WBC Count 8.5      RBC Count 4.80      Hemoglobin 14.3      Hematocrit 42.1      MCV 88      MCH 29.8      MCHC 34.0      RDW 13.2      Platelet Count 302      % Neutrophils 59      % Lymphocytes 29      % Monocytes 7      % Eosinophils 4      % Basophils 1      % Immature Granulocytes 0      NRBCs per 100 WBC 0      Absolute Neutrophils 5.1      Absolute Lymphocytes 2.5      Absolute Monocytes 0.6      Absolute Eosinophils 0.3      Absolute Basophils 0.0      Absolute Immature Granulocytes 0.0      Absolute NRBCs 0.0        Emergency Department Course & Assessments:     Interventions:  Medications   iopamidol  (ISOVUE-370) solution 135 mL (135 mLs Intravenous $Given 5/24/23 1610)   Saline Flush (79 mLs Intravenous $Given 5/24/23 1611)   famotidine (PEPCID) injection 40 mg (40 mg Intravenous $Given 5/24/23 1824)      Independent Interpretation (X-rays, CTs, rhythm strip):  None     Assessments/Consultations/Discussion of Management or Tests:   ED Course as of 05/24/23 2341   Wed May 24, 2023   1755 I obtained history and examined the patient as noted above.    1822 Heart score 3     Social Determinants of Health affecting care:   None    Disposition:  The patient was discharged to home.     Impression & Plan      Medical Decision Making:  This is 42-year-old male who presents to the emergency department with 3 days of upper abdominal discomfort bloating specifically his epigastric pain.  I considered multiple diagnoses and such as pancreatitis, gastritis, ACS, AAA, diverticulitis, bowel obstruction, among many others.  I have low suspicion for chest pathology causing his epigastric pain.  Although he does have some new flipped T's in aVF he does have old flipped T's when compared with previous EKGs.   Patient is not having active chest pain or shortness of breath.  His epigastric discomfort actually feels improved.  Given that there is an association with eating I suspect that this is likely related to gastritis.  CT imaging was obtained showing no evidence of pancreatitis bowel disease such as diverticulitis or obstruction.  Patient has no gallbladder.  Patient had troponin which was undetectable and given that his symptoms have been going on for 3 days makes it very unlikely that this is ACS.  I do not think he is having any dissection of aneurysm today.  Otherwise the rest of his labs and urine are relatively normal.  Patient has a heart score 3.  Patient will plan on following up with primary care in [place] and/or GI if his symptoms persist and to discuss reevaluation.  Recommend omeprazole and English Carafate for  gastritis.  I think the most likely diagnosis at this time is gastritis.  She understands return back to emergency department acutely if his symptoms worsen he develops chest pain shortness of breath flank or back pain worsening abdominal pain numbness or weakness or worsening condition.    Diagnosis:    ICD-10-CM    1. Abdominal pain, epigastric  R10.13          Discharge Medications:  Discharge Medication List as of 5/24/2023  6:31 PM      START taking these medications    Details   omeprazole (PRILOSEC) 40 MG DR capsule Take 1 capsule (40 mg) by mouth daily for 14 days, Disp-14 capsule, R-0, Local Print      sucralfate (CARAFATE) 1 GM tablet Take 1 tablet (1 g) by mouth 4 times daily for 14 days, Disp-56 tablet, R-0, Local Print            Scribe Disclosure:  I, Nadiya Oquendo, am serving as a scribe at 6:13 PM on 5/24/2023 to document services personally performed by Hosea Cardenas PA-C based on my observations and the provider's statements to me.     5/24/2023   Hosea Cardenas PA-C Kruger, Jacob C, PA-C  05/25/23 1133

## 2023-05-24 NOTE — PATIENT INSTRUCTIONS
To [facility] ER for further evaluation of your continued diarrhea now for 4 days with worsening symptoms myalgias and fatigue, type 2 diabetes, normal fasting blood sugars, history of laparoscopic Nissen fundoplication in  2001 and for the first time since 2001 having left upper quadrant abdominal pain and return of reflux symptoms-need further evaluation and possible imaging for questionable disruption of your Nissen fundoplication

## 2023-05-24 NOTE — PROGRESS NOTES
ASSESSMENT/PLAN:      ICD-10-CM    1. LUQ abdominal pain  R10.12      hx of lap nissen 2001, now with luq pain with rebound on exam, onset of gerd symptoms/reflux -no hx of since nissen in 2001, ?breakdown of nissen,       2. Gastroenteritis  K52.9     4 days of nausea/diarrhea, worsening,pt with hx of lap nissen       3. History of Nissen fundoplication  Z98.890     laproscopic 2001       4. History of cholecystectomy  Z90.49     laproscopic 1997      5. Type 2 diabetes mellitus with other circulatory complication, without long-term current use of insulin (H)  E11.59     blood sugars stable       6. Morbid obesity with BMI of 45.0-49.9, adult (H)  E66.01     Z68.42         To [facility] ER for further evaluation of your continued diarrhea now for 4 days with worsening symptoms myalgias and fatigue, type 2 diabetes, normal fasting blood sugars, history of laparoscopic Nissen fundoplication in  2001 and for the first time since 2001 having left upper quadrant abdominal pain and return of reflux symptoms-need further evaluation and possible imaging for questionable breakdown of your Nissen fundoplication     He will be going to the Er via private car driven by family member-patient information given to ER staff staff member who will sign this out to the triage nurse        Reviewed medication instructions and side effects. Follow up if experiences side effects.     I reviewed supportive care, otc meds to use if needed, expected course, and signs of concern.  Follow up as needed or if Josué Franco does not improve within  1-2 days or if worsens in any way.  Reviewed red flag symptoms and is to go to the ER if experiences any of these.     The use of Dragon/[employer] services may have been used to construct the content in this note; any grammatical or spelling errors are non-intentional. Please contact the author of this note directly if you are in need of any clarification.      On the day of the  encounter, time spend on chart review, patient visit, review of testing, documentation was 30  minutes          Patient Instructions   To [facility] ER for further evaluation of your continued diarrhea now for 4 days with worsening symptoms myalgias and fatigue, type 2 diabetes, normal fasting blood sugars, history of laparoscopic Nissen fundoplication in  2001 and for the first time since 2001 having left upper quadrant abdominal pain and return of reflux symptoms-need further evaluation and possible imaging for questionable disruption of your Nissen fundoplication               Patient presents with:  Nausea & Vomiting: Since Sunday, consistent diarrhea. Pain in abdomin, body aches.        Subjective     Josué Franco is a 42 year old adult who presents to clinic today for the following health issues:    HPI     Abdominal Pain    Location: across upper abdomen    Radiation: None and intermittent into back  Pain character: dull and constant, worsens with movement  Hx of ajay in 2001 ,  Unable to vomit, but now having heartburn, acid taste in mouth   In past 12-24 episode and few diarrheal stools and resolved   Severity: 6-7 on a scale of 1-10.    Duration: 4 day(s)   Course of Illness: slowly worsening .  Exacerbated by: eating- more diarrhea, pain in abdomen after eating   Relieved by: nothing.  Associated Symptoms: nausea, diarrhea, belching,  Since onset myalgias and fatigue, no fever,  no chills   Female : Not applicable  Surgical History: lap nissen-2001, bruce marisa 1997   Diabetes-on meds, has not missed meds, 170 blood sugar  fasting in am     Diarrheal stools 10 times a day starting Sunday night -eating regular food-no change in diet  Diarrhea -not at night, does not wake him up from sleep  This am -urgency to have a stool-push volume of loose stool, followed by several loose stools, then later in more loose stools, no black stools        Past Medical History:   Diagnosis Date     Asthma, persistent,  severity to be determined      Diabetes mellitus type II, controlled (H)     with other complications     HTN (hypertension)      Morbid obesity (H)      Nephrolithiasis      ADRIANNA on CPAP      PONV (postoperative nausea and vomiting)      Social History     Tobacco Use     Smoking status: Never     Smokeless tobacco: Never   Vaping Use     Vaping status: Never Used   Substance Use Topics     Alcohol use: No     Comment: Very moderate 0-1/week         Allergies   Allergen Reactions     No Known Allergies            ROS are negative, except as otherwise noted HPI      Objective    BP (!) 134/90 (BP Location: Right arm, Cuff Size: Adult Large)   Pulse 77   Temp 98.3  F (36.8  C) (Tympanic)   Resp 16   Wt (!) 158.1 kg (348 lb 8 oz)   SpO2 98%   BMI 44.73 kg/m    Body mass index is 44.73 kg/m .  Physical Exam   GENERAL:  alert and mild distress  ABDOMEN: obese,distended, left upper quadrant tenderness with rebound  and bowel sounds normal  NEURO:Alert and oriented x3, normal strength and tone, normal gait      Diagnostic Test Results:  Labs reviewed in Epic  none

## 2023-06-01 DIAGNOSIS — E11.59 TYPE 2 DIABETES MELLITUS WITH OTHER CIRCULATORY COMPLICATION, WITHOUT LONG-TERM CURRENT USE OF INSULIN (H): ICD-10-CM

## 2023-06-01 RX ORDER — METFORMIN HCL 500 MG
TABLET, EXTENDED RELEASE 24 HR ORAL
Qty: 360 TABLET | Refills: 0 | OUTPATIENT
Start: 2023-06-01

## 2023-06-01 NOTE — TELEPHONE ENCOUNTER
"Pending Prescriptions:                       Disp   Refills    metFORMIN (GLUCOPHAGE XR) 500 MG 24 hr tab*360 ta*0        Sig: TAKE 4 TABLETS BY MOUTH DAILY WITH DINNER    Routing refill request to provider for review/approval because:  Labs not current:  A1c  A break in medication  Patient needs to be seen because it has been more than 1 year since last office visit.    Requested Prescriptions   Pending Prescriptions Disp Refills    metFORMIN (GLUCOPHAGE XR) 500 MG 24 hr tablet [Pharmacy Med Name: METFORMIN HCL  MG TABLET] 360 tablet 0     Sig: TAKE 4 TABLETS BY MOUTH DAILY WITH DINNER       Biguanide Agents Failed - 6/1/2023 10:39 AM        Failed - Patient has documented A1c within the specified period of time.     If HgbA1C is 8 or greater, it needs to be on file within the past 3 months.  If less than 8, must be on file within the past 6 months.     Recent Labs   Lab Test 08/18/22  0745   A1C 9.6*             Failed - Recent (6 mo) or future (30 days) visit within the authorizing provider's specialty     Patient had office visit in the last 6 months or has a visit in the next 30 days with authorizing provider or within the authorizing provider's specialty.  See \"Patient Info\" tab in inbasket, or \"Choose Columns\" in Meds & Orders section of the refill encounter.            Passed - Patient is age 10 or older        Passed - Patient's CR is NOT>1.4 OR Patient's EGFR is NOT<45 within past 12 mos.     Recent Labs   Lab Test 05/24/23  1516 11/19/21  0803 12/04/20  1034   GFRESTIMATED >90   < > >90   GFRESTBLACK  --   --  >90    < > = values in this interval not displayed.       Recent Labs   Lab Test 05/24/23  1516   CR 0.81             Passed - Patient does NOT have a diagnosis of CHF.        Passed - Medication is active on med list             "

## 2023-06-02 ENCOUNTER — HEALTH MAINTENANCE LETTER (OUTPATIENT)
Age: 43
End: 2023-06-02

## 2023-06-19 ENCOUNTER — MYC REFILL (OUTPATIENT)
Dept: FAMILY MEDICINE | Facility: CLINIC | Age: 43
End: 2023-06-19
Payer: COMMERCIAL

## 2023-06-19 DIAGNOSIS — E11.59 TYPE 2 DIABETES MELLITUS WITH OTHER CIRCULATORY COMPLICATION, WITHOUT LONG-TERM CURRENT USE OF INSULIN (H): ICD-10-CM

## 2023-06-19 RX ORDER — METFORMIN HCL 500 MG
2000 TABLET, EXTENDED RELEASE 24 HR ORAL
Qty: 360 TABLET | Refills: 0 | Status: CANCELLED | OUTPATIENT
Start: 2023-06-19

## 2023-06-20 NOTE — TELEPHONE ENCOUNTER
"Pending Prescriptions:                       Disp   Refills    metFORMIN (GLUCOPHAGE XR) 500 MG 24 hr tab*360 ta*0        Sig: Take 4 tablets (2,000 mg) by mouth daily (with           dinner)    Routing refill request to provider for review/approval because:  Labs not current: A1C    Appointments in Next Year      Jul 05, 2023  7:45 AM  Lab visit with [facility]  [facility] ([facility] ) [phone]          Requested Prescriptions   Pending Prescriptions Disp Refills    metFORMIN (GLUCOPHAGE XR) 500 MG 24 hr tablet 360 tablet 0     Sig: Take 4 tablets (2,000 mg) by mouth daily (with dinner)       Biguanide Agents Failed - 6/20/2023  4:25 PM        Failed - Patient has documented A1c within the specified period of time.     If HgbA1C is 8 or greater, it needs to be on file within the past 3 months.  If less than 8, must be on file within the past 6 months.     Recent Labs   Lab Test 08/18/22  0745   A1C 9.6*             Failed - Recent (6 mo) or future (30 days) visit within the authorizing provider's specialty     Patient had office visit in the last 6 months or has a visit in the next 30 days with authorizing provider or within the authorizing provider's specialty.  See \"Patient Info\" tab in inbasket, or \"Choose Columns\" in Meds & Orders section of the refill encounter.            Passed - Patient is age 10 or older        Passed - Patient's CR is NOT>1.4 OR Patient's EGFR is NOT<45 within past 12 mos.     Recent Labs   Lab Test 05/24/23  1516 11/19/21  0803 12/04/20  1034   GFRESTIMATED >90   < > >90   GFRESTBLACK  --   --  >90    < > = values in this interval not displayed.       Recent Labs   Lab Test 05/24/23  1516   CR 0.81             Passed - Patient does NOT have a diagnosis of CHF.        Passed - Medication is active on med list                 "

## 2023-06-26 ENCOUNTER — MYC REFILL (OUTPATIENT)
Dept: FAMILY MEDICINE | Facility: CLINIC | Age: 43
End: 2023-06-26
Payer: COMMERCIAL

## 2023-06-26 DIAGNOSIS — E11.59 TYPE 2 DIABETES MELLITUS WITH OTHER CIRCULATORY COMPLICATION, WITHOUT LONG-TERM CURRENT USE OF INSULIN (H): ICD-10-CM

## 2023-06-26 RX ORDER — METFORMIN HCL 500 MG
2000 TABLET, EXTENDED RELEASE 24 HR ORAL
Qty: 360 TABLET | Refills: 0 | Status: SHIPPED | OUTPATIENT
Start: 2023-06-26 | End: 2023-07-06

## 2023-06-26 NOTE — TELEPHONE ENCOUNTER
Pending Prescriptions:                       Disp   Refills    metFORMIN (GLUCOPHAGE XR) 500 MG 24 hr ta*360 ta*0            Sig: Take 4 tablets (2,000 mg) by mouth daily (with           dinner)    Medication is being filled for 1 time narayan refill only due to:  Patient is due for   Next 5 appointments (look out 90 days)    Jul 05, 2023  7:45 AM  Lab visit with [facility]  [facility] ([facility] ) [address]  [address]  [facility] [address]  [phone]   Jul 06, 2023  1:30 PM  (Arrive by 1:10 PM)  Provider Visit with LAWSON Hicks CNP  [facility] ([facility] ) [address], [address]  [facility] [id number] [phone]            Please call and help schedule.  Thank you!

## 2023-07-05 ENCOUNTER — LAB (OUTPATIENT)
Dept: LAB | Facility: CLINIC | Age: 43
End: 2023-07-05
Payer: COMMERCIAL

## 2023-07-05 DIAGNOSIS — E11.59 TYPE 2 DIABETES MELLITUS WITH OTHER CIRCULATORY COMPLICATION, WITHOUT LONG-TERM CURRENT USE OF INSULIN (H): Primary | ICD-10-CM

## 2023-07-05 LAB
CHOLEST SERPL-MCNC: 128 MG/DL
CREAT UR-MCNC: 278 MG/DL
HBA1C MFR BLD: 7.9 % (ref 0–5.6)
HDLC SERPL-MCNC: 31 MG/DL
LDLC SERPL CALC-MCNC: 49 MG/DL
MICROALBUMIN UR-MCNC: 15.5 MG/L
MICROALBUMIN/CREAT UR: 5.58 MG/G CR (ref 0–25)
NONHDLC SERPL-MCNC: 97 MG/DL
TRIGL SERPL-MCNC: 241 MG/DL

## 2023-07-05 PROCEDURE — 80061 LIPID PANEL: CPT

## 2023-07-05 PROCEDURE — 83036 HEMOGLOBIN GLYCOSYLATED A1C: CPT

## 2023-07-05 PROCEDURE — 82043 UR ALBUMIN QUANTITATIVE: CPT

## 2023-07-05 PROCEDURE — 36415 COLL VENOUS BLD VENIPUNCTURE: CPT

## 2023-07-05 PROCEDURE — 82570 ASSAY OF URINE CREATININE: CPT

## 2023-07-06 ENCOUNTER — VIRTUAL VISIT (OUTPATIENT)
Dept: FAMILY MEDICINE | Facility: CLINIC | Age: 43
End: 2023-07-06
Payer: COMMERCIAL

## 2023-07-06 DIAGNOSIS — E11.59 TYPE 2 DIABETES MELLITUS WITH OTHER CIRCULATORY COMPLICATION, WITHOUT LONG-TERM CURRENT USE OF INSULIN (H): Primary | ICD-10-CM

## 2023-07-06 DIAGNOSIS — E66.01 MORBID OBESITY DUE TO EXCESS CALORIES (H): ICD-10-CM

## 2023-07-06 DIAGNOSIS — Z98.890 HISTORY OF NISSEN FUNDOPLICATION: ICD-10-CM

## 2023-07-06 PROCEDURE — 99214 OFFICE O/P EST MOD 30 MIN: CPT | Mod: VID | Performed by: NURSE PRACTITIONER

## 2023-07-06 RX ORDER — DULAGLUTIDE 1.5 MG/.5ML
INJECTION, SOLUTION SUBCUTANEOUS
COMMUNITY
Start: 2023-04-28 | End: 2023-11-09

## 2023-07-06 RX ORDER — METFORMIN HCL 500 MG
2000 TABLET, EXTENDED RELEASE 24 HR ORAL
Qty: 360 TABLET | Refills: 1 | Status: SHIPPED | OUTPATIENT
Start: 2023-07-06 | End: 2024-03-25

## 2023-07-06 ASSESSMENT — PATIENT HEALTH QUESTIONNAIRE - PHQ9
SUM OF ALL RESPONSES TO PHQ QUESTIONS 1-9: 2
SUM OF ALL RESPONSES TO PHQ QUESTIONS 1-9: 2
10. IF YOU CHECKED OFF ANY PROBLEMS, HOW DIFFICULT HAVE THESE PROBLEMS MADE IT FOR YOU TO DO YOUR WORK, TAKE CARE OF THINGS AT HOME, OR GET ALONG WITH OTHER PEOPLE: NOT DIFFICULT AT ALL

## 2023-07-06 ASSESSMENT — ASTHMA QUESTIONNAIRES: ACT_TOTALSCORE: 24

## 2023-07-06 NOTE — PROGRESS NOTES
Josué is a 43 year old who is being evaluated via a billable video visit.      How would you like to obtain your AVS? MyChart  If the video visit is dropped, the invitation should be resent by: Text to cell phone: [phone]  Will anyone else be joining your video visit? No        Assessment & Plan     Type 2 diabetes mellitus with other circulatory complication, without long-term current use of insulin (H)  Improving, not meeting goal of < 7.0  Increased dose of Metformin is better.   Advise A1c re-check in 3 months. Keep working on nutrition.   COntinue plan.   Needs f2f for next refills.     - metFORMIN (GLUCOPHAGE XR) 500 MG 24 hr tablet; Take 4 tablets (2,000 mg) by mouth daily (with dinner)    Morbid obesity due to excess calories (H)  As above.     History of Nissen fundoplication  Following GI in [place].     States asthma is stable.                MEDICATIONS:  Continue current medications without change    LAWSON Hernandez CNP  [facility] RINA Moses is a 43 year old, presenting for the following health issues:  Diabetes        7/6/2023     9:57 AM   Additional Questions   Roomed by Renate ORTEGA   Accompanied by self     History of Present Illness       Diabetes:   Josué Franco presents for follow up of diabetes.  Josué Franco is checking home blood glucose one time daily. Josué Franco checks blood glucose before meals.  Blood glucose is sometimes over 200 and never under 70. When Josué Franco's blood glucose is low, the patient is asymptomatic for confusion, blurred vision, lethargy and reports not feeling dizzy, shaky, or weak.  Josué Franco is concerned about other. Josué Franco is having numbness in feet and excessive thirst. The patient has had a diabetic eye exam in the last 12 months. Eye exam performed on january or february. Location of last eye exam [place], [place].        Josué Franco eats 2-3 servings of fruits and vegetables daily.Josué GRIFFIN  Salvador consumes 1 sweetened beverage(s) daily.Josué Franco exercises with enough effort to increase Josué Franco's heart rate 10 to 19 minutes per day.  Josué Franco exercises with enough effort to increase Josué Johansens heart rate 3 or less days per week.   Josué Franco is taking medications regularly.    Today's PHQ-9         PHQ-9 Total Score: 2    PHQ-9 Q9 Thoughts of better off dead/self-harm past 2 weeks :   Not at all    How difficult have these problems made it for you to do your work, take care of things at home, or get along with other people: Not difficult at all     Living mostly in [place],   Getting some overlapping care.   Needs refills added to metformin. Is bringing down BG levels since starting last week.     Work is going well.   Mood is great- no issues.     May need Nissen- Fundoplication revision. Working with GI in [place].       Outside labs reviewed.   Normal LFT's. And renal function.   Due for PE.   Lab Results   Component Value Date    A1C 7.9 07/05/2023    A1C 9.6 08/18/2022    A1C 9.0 11/19/2021    A1C 7.1 12/04/2020    A1C 7.4 01/24/2020    A1C 7.7 10/24/2019    A1C 7.1 05/03/2019    A1C 7.6 02/21/2019      not meeting goal of < 7.0  Has Trulicity 1.5 mg dosing. Down about 30 LBS since last fall.   Is trying to cut back on soda.           Review of Systems   Constitutional, HEENT, cardiovascular, pulmonary, gi and gu systems are negative, except as otherwise noted.      Objective    Vitals - Patient Reported  Weight (Patient Reported): 156.5 kg (345 lb)  Pain Score: Moderate Pain (4)  Pain Loc: Other - see comment (stomach)        Physical Exam   GENERAL: Healthy, alert and no distress  EYES: Eyes grossly normal to inspection.  No discharge or erythema, or obvious scleral/conjunctival abnormalities.  RESP: No audible wheeze, cough, or visible cyanosis.  No visible retractions or increased work of breathing.    SKIN: Visible skin clear. No significant rash, abnormal  pigmentation or lesions.  NEURO: Cranial nerves grossly intact.  Mentation and speech appropriate for age.  PSYCH: Mentation appears normal, affect normal/bright, judgement and insight intact, normal speech and appearance well-groomed.                Video-Visit Details    Type of service:  Video Visit     Originating Location (pt. Location): Home  Distant Location (provider location):  Off-site  Platform used for Video Visit: [employer]

## 2023-07-06 NOTE — RESULT ENCOUNTER NOTE
Dear Josué,    Here is a summary of your recent test results:  -Cholesterol levels (LDL,HDL, Triglycerides) are okay.  ADVISE: rechecking  in 1 year.  -A1C (test of diabetes control the last 2-3 months) is above your goal. Please closely you have a diabetic diet and recheck your A1C test in 3 months.   -Microalbumin (urine protein) test is normal.  ADVISE: rechecking this annually.    For additional lab test information, www.testing.com is a very good reference.    In addition, here is a list of due or overdue Health Maintenance reminders:  Yearly Preventive Visit due on 01/24/2021  Pneumococcal Vaccine(2 - PCV) due on 01/24/2021  Diabetic Foot Exam due on 11/19/2022  Asthma Control Test due on 01/21/2023  Eye Exam due on 04/15/2023    Please call us at [phone] (or use [employer]) to address the above recommendations if needed.           Thank you very much for trusting me and [facility].     Have a peaceful day.    Healthy regards,  Max Maravilla MD

## 2023-07-20 ENCOUNTER — MYC MEDICAL ADVICE (OUTPATIENT)
Dept: FAMILY MEDICINE | Facility: CLINIC | Age: 43
End: 2023-07-20
Payer: COMMERCIAL

## 2023-07-20 NOTE — TELEPHONE ENCOUNTER
Patient Quality Outreach    Patient is due for the following:   Diabetes -  Eye Exam and Foot Exam  Asthma  -  AAP  Physical Preventive Adult Physical      Topic Date Due     Pneumococcal Vaccine (2 - PCV) 01/24/2021     COVID-19 Vaccine (4 - Pfizer series) 02/18/2022       Next Steps:   Schedule a Adult Preventative   Ask patient to contact eye clinic in [place] to have them fax us his last exam from beginning of the year or ask if he will sign RALEIGH if mailed to him to mail back to us and we can fax the request     Type of outreach:    Sent [employer] message.      Questions for provider review:    Update AAP           Deana Juarez, [facility]  Chart routed to Care Team and provider.

## 2023-07-20 NOTE — LETTER
My Asthma Action Plan    Name: Josué Franco   YOB: 1980  Date: 7/20/2023   My doctor: LAWSON Hernandez CNP   My clinic: [facility]        My Rescue Medicine:   Albuterol inhaler (Proair/Ventolin/Proventil HFA)  2-4 puffs EVERY 4 HOURS as needed. Use a spacer if recommended by your provider.   My Asthma Severity:   Intermittent / Exercise Induced  Know your asthma triggers: upper respiratory infections, humidity, and cold air  weather changes ..etc..          GREEN ZONE   Good Control  I feel good  No cough or wheeze  Can work, sleep and play without asthma symptoms       Take your asthma control medicine every day.     If exercise triggers your asthma, take your rescue medication  15 minutes before exercise or sports, and  During exercise if you have asthma symptoms  Spacer to use with inhaler: If you have a spacer, make sure to use it with your inhaler             YELLOW ZONE Getting Worse  I have ANY of these:  I do not feel good  Cough or wheeze  Chest feels tight  Wake up at night   Keep taking your Green Zone medications  Start taking your rescue medicine:  every 20 minutes for up to 1 hour. Then every 4 hours for 24-48 hours.  If you stay in the Yellow Zone for more than 12-24 hours, contact your doctor.  If you do not return to the Green Zone in 12-24 hours or you get worse, start taking your oral steroid medicine if prescribed by your provider.           RED ZONE Medical Alert - Get Help  I have ANY of these:  I feel awful  Medicine is not helping  Breathing getting harder  Trouble walking or talking  Nose opens wide to breathe       Take your rescue medicine NOW  If your provider has prescribed an oral steroid medicine, start taking it NOW  Call your doctor NOW  If you are still in the Red Zone after 20 minutes and you have not reached your doctor:  Take your rescue medicine again and  Call 911 or go to the emergency room right away    See your regular doctor  within 2 weeks of an Emergency Room or Urgent Care visit for follow-up treatment.          Annual Reminders:  Meet with Asthma Educator,  Flu Shot in the Fall, consider Pneumonia Vaccination for patients with asthma (aged 19 and older).    Pharmacy:    [facility] [place] - [address]  [facility] [facility] - [place] [address]  [id number] - [place], [place] - [address]  [facility] [address] [facility] - [place], [place] - [address]    Electronically signed by LAWSON Hernandez CNP   Date: 07/20/23                    Asthma Triggers  How To Control Things That Make Your Asthma Worse    Triggers are things that make your asthma worse.  Look at the list below to help you find your triggers and   what you can do about them. You can help prevent asthma flare-ups by staying away from your triggers.      Trigger                                                          What you can do   Cigarette Smoke  Tobacco smoke can make asthma worse. Do not allow smoking in your home, car or around you.  Be sure no one smokes at a child s day care or school.  If you smoke, ask your health care provider for ways to help you quit.  Ask family members to quit too.  Ask your health care provider for a referral to Quit Plan to help you quit smoking, or call 6-384-337-PLAN.     Colds, Flu, Bronchitis  These are common triggers of asthma. Wash your hands often.  Don t touch your eyes, nose or mouth.  Get a flu shot every year.     Dust Mites  These are tiny bugs that live in cloth or carpet. They are too small to see. Wash sheets and blankets in hot water every week.   Encase pillows and mattress in dust mite proof covers.  Avoid having carpet if you can. If you have carpet, vacuum weekly.   Use a dust mask and HEPA vacuum.   Pollen and Outdoor Mold  Some people are allergic to trees, grass, or weed pollen, or molds. Try to keep your windows closed.  Limit time out  doors when pollen count is high.   Ask you health care provider about taking medicine during allergy season.     Animal Dander  Some people are allergic to skin flakes, urine or saliva from pets with fur or feathers. Keep pets with fur or feathers out of your home.    If you can t keep the pet outdoors, then keep the pet out of your bedroom.  Keep the bedroom door closed.  Keep pets off cloth furniture and away from stuffed toys.     Mice, Rats, and Cockroaches  Some people are allergic to the waste from these pests.   Cover food and garbage.  Clean up spills and food crumbs.  Store grease in the refrigerator.   Keep food out of the bedroom.   Indoor Mold  This can be a trigger if your home has high moisture. Fix leaking faucets, pipes, or other sources of water.   Clean moldy surfaces.  Dehumidify basement if it is damp and smelly.   Smoke, Strong Odors, and Sprays  These can reduce air quality. Stay away from strong odors and sprays, such as perfume, powder, hair spray, paints, smoke incense, paint, cleaning products, candles and new carpet.   Exercise or Sports  Some people with asthma have this trigger. Be active!  Ask your doctor about taking medicine before sports or exercise to prevent symptoms.    Warm up for 5-10 minutes before and after sports or exercise.     Other Triggers of Asthma  Cold air:  Cover your nose and mouth with a scarf.  Sometimes laughing or crying can be a trigger.  Some medicines and food can trigger asthma.

## 2023-07-27 NOTE — TELEPHONE ENCOUNTER
Patient will call us back to schedule, and reminded him to get those records sent over to us, he has a RALEIGH and our fax number

## 2023-10-23 ENCOUNTER — MYC MEDICAL ADVICE (OUTPATIENT)
Dept: FAMILY MEDICINE | Facility: CLINIC | Age: 43
End: 2023-10-23
Payer: COMMERCIAL

## 2023-10-23 NOTE — TELEPHONE ENCOUNTER
Patient Quality Outreach    Patient is due for the following:   Diabetes -  Eye Exam and Foot Exam  Physical Preventive Adult Physical      Topic Date Due    Pneumococcal Vaccine (2 - PCV) 01/24/2021    Flu Vaccine (1) 09/01/2023    COVID-19 Vaccine (4 - 2023-24 season) 09/01/2023       Next Steps:   Schedule a Adult Preventative    Type of outreach:    Sent [employer] message.  Call to schedule in 1 week if not scheduled/completed    Questions for provider review:    None           Deana Juarez [facility]  Chart routed to Care Team.

## 2023-11-08 ENCOUNTER — TRANSFERRED RECORDS (OUTPATIENT)
Dept: MULTI SPECIALTY CLINIC | Facility: CLINIC | Age: 43
End: 2023-11-08

## 2023-11-08 DIAGNOSIS — E11.59 TYPE 2 DIABETES MELLITUS WITH OTHER CIRCULATORY COMPLICATION, WITHOUT LONG-TERM CURRENT USE OF INSULIN (H): Primary | ICD-10-CM

## 2023-11-08 LAB — RETINOPATHY: NORMAL

## 2023-11-09 RX ORDER — DULAGLUTIDE 1.5 MG/.5ML
INJECTION, SOLUTION SUBCUTANEOUS
Qty: 2 ML | Refills: 0 | Status: SHIPPED | OUTPATIENT
Start: 2023-11-09 | End: 2023-12-28

## 2023-11-09 NOTE — TELEPHONE ENCOUNTER
Advised due for F2f for refills.   Needs labs- historical med, 1 month given.   LAWSON Hernandez CNP

## 2023-11-30 ENCOUNTER — TELEPHONE (OUTPATIENT)
Dept: PHARMACY | Facility: OTHER | Age: 43
End: 2023-11-30
Payer: COMMERCIAL

## 2023-11-30 NOTE — TELEPHONE ENCOUNTER
MTM referral from: Patient's insurance ([employer] products)    MTM referral outreach attempt #2 on December 12, 2023      Outcome: Opted out for now. [place].       To schedule an appointment, please call the clinic MTM services phone number, [phone].  Also you can call the scheduling line at [phone].      I hope to see you soon!     Sincerely,         Aisha Wisdom, DonnaD     Medication Therapy Management (MTM) Pharmacist                           Patient referred to Medication Therapy Management (MTM) by their health insurance [employer]. Called today for scheduling, no answer, unable to lvm.

## 2023-12-01 DIAGNOSIS — I10 ESSENTIAL HYPERTENSION: ICD-10-CM

## 2023-12-01 RX ORDER — METOPROLOL SUCCINATE 50 MG/1
50 TABLET, EXTENDED RELEASE ORAL DAILY
Qty: 90 TABLET | Refills: 0 | Status: SHIPPED | OUTPATIENT
Start: 2023-12-01 | End: 2024-03-01

## 2023-12-27 DIAGNOSIS — E11.59 TYPE 2 DIABETES MELLITUS WITH OTHER CIRCULATORY COMPLICATION, WITHOUT LONG-TERM CURRENT USE OF INSULIN (H): ICD-10-CM

## 2023-12-28 RX ORDER — DULAGLUTIDE 1.5 MG/.5ML
INJECTION, SOLUTION SUBCUTANEOUS
Qty: 2 ML | Refills: 0 | Status: SHIPPED | OUTPATIENT
Start: 2023-12-28 | End: 2024-01-05

## 2024-01-05 ENCOUNTER — MYC REFILL (OUTPATIENT)
Dept: FAMILY MEDICINE | Facility: CLINIC | Age: 44
End: 2024-01-05
Payer: COMMERCIAL

## 2024-01-05 DIAGNOSIS — E11.59 TYPE 2 DIABETES MELLITUS WITH OTHER CIRCULATORY COMPLICATION, WITHOUT LONG-TERM CURRENT USE OF INSULIN (H): ICD-10-CM

## 2024-01-05 RX ORDER — DULAGLUTIDE 1.5 MG/.5ML
1.5 INJECTION, SOLUTION SUBCUTANEOUS
Qty: 2 ML | Refills: 0 | Status: SHIPPED | OUTPATIENT
Start: 2024-01-05 | End: 2024-01-12

## 2024-01-05 RX ORDER — DULAGLUTIDE 0.75 MG/.5ML
1.5 INJECTION, SOLUTION SUBCUTANEOUS
Qty: 4 ML | Refills: 0 | Status: SHIPPED | OUTPATIENT
Start: 2024-01-05 | End: 2024-01-09 | Stop reason: DRUGHIGH

## 2024-01-05 NOTE — TELEPHONE ENCOUNTER
Please review patient message below attached to this refill request. A refill on the 1.5 was sent today but due to shortages he is unable to get this anywhere. He is asking for an alterative dose or dosing schedule.    dulaglutide (TRULICITY) 1.5 MG/0.5ML pen [Belkis Chadwick]      Patient Comment: They do not have the 1.5 in stock anywhere around me. Would i be able to get moved to 2x the .75 or up to the 4.5 dosage as those are available?

## 2024-01-05 NOTE — TELEPHONE ENCOUNTER
Patient calling and RN relayed below message. RN discussed that higher dose needs an evaluation and that 0.75 was sent. Patient stated understanding. No further questions or concerns at this time.

## 2024-01-05 NOTE — TELEPHONE ENCOUNTER
Needs eval for dose adjustment- seeing GI for early satiety, so not advising higher dose at this time.   RX sent.   LAWSON Hernandez CNP

## 2024-01-09 ENCOUNTER — VIRTUAL VISIT (OUTPATIENT)
Dept: FAMILY MEDICINE | Facility: CLINIC | Age: 44
End: 2024-01-09
Payer: COMMERCIAL

## 2024-01-09 DIAGNOSIS — E11.59 TYPE 2 DIABETES MELLITUS WITH OTHER CIRCULATORY COMPLICATION, WITHOUT LONG-TERM CURRENT USE OF INSULIN (H): Primary | ICD-10-CM

## 2024-01-09 DIAGNOSIS — F41.1 GAD (GENERALIZED ANXIETY DISORDER): Chronic | ICD-10-CM

## 2024-01-09 DIAGNOSIS — E66.01 MORBID OBESITY DUE TO EXCESS CALORIES (H): ICD-10-CM

## 2024-01-09 PROCEDURE — 99214 OFFICE O/P EST MOD 30 MIN: CPT | Mod: 95 | Performed by: NURSE PRACTITIONER

## 2024-01-09 RX ORDER — DULAGLUTIDE 3 MG/.5ML
3 INJECTION, SOLUTION SUBCUTANEOUS WEEKLY
Qty: 6 ML | Refills: 0 | Status: SHIPPED | OUTPATIENT
Start: 2024-01-09 | End: 2024-01-12

## 2024-01-09 RX ORDER — LORAZEPAM 1 MG/1
1 TABLET ORAL DAILY PRN
Qty: 20 TABLET | Refills: 0 | Status: SHIPPED | OUTPATIENT
Start: 2024-01-09 | End: 2024-01-30

## 2024-01-09 ASSESSMENT — ANXIETY QUESTIONNAIRES
7. FEELING AFRAID AS IF SOMETHING AWFUL MIGHT HAPPEN: SEVERAL DAYS
7. FEELING AFRAID AS IF SOMETHING AWFUL MIGHT HAPPEN: SEVERAL DAYS
6. BECOMING EASILY ANNOYED OR IRRITABLE: NOT AT ALL
5. BEING SO RESTLESS THAT IT IS HARD TO SIT STILL: NOT AT ALL
GAD7 TOTAL SCORE: 3
IF YOU CHECKED OFF ANY PROBLEMS ON THIS QUESTIONNAIRE, HOW DIFFICULT HAVE THESE PROBLEMS MADE IT FOR YOU TO DO YOUR WORK, TAKE CARE OF THINGS AT HOME, OR GET ALONG WITH OTHER PEOPLE: SOMEWHAT DIFFICULT
3. WORRYING TOO MUCH ABOUT DIFFERENT THINGS: NOT AT ALL
8. IF YOU CHECKED OFF ANY PROBLEMS, HOW DIFFICULT HAVE THESE MADE IT FOR YOU TO DO YOUR WORK, TAKE CARE OF THINGS AT HOME, OR GET ALONG WITH OTHER PEOPLE?: SOMEWHAT DIFFICULT
4. TROUBLE RELAXING: NOT AT ALL
1. FEELING NERVOUS, ANXIOUS, OR ON EDGE: SEVERAL DAYS
2. NOT BEING ABLE TO STOP OR CONTROL WORRYING: SEVERAL DAYS
GAD7 TOTAL SCORE: 3
GAD7 TOTAL SCORE: 3

## 2024-01-09 ASSESSMENT — ASTHMA QUESTIONNAIRES
QUESTION_3 LAST FOUR WEEKS HOW OFTEN DID YOUR ASTHMA SYMPTOMS (WHEEZING, COUGHING, SHORTNESS OF BREATH, CHEST TIGHTNESS OR PAIN) WAKE YOU UP AT NIGHT OR EARLIER THAN USUAL IN THE MORNING: ONCE OR TWICE
QUESTION_4 LAST FOUR WEEKS HOW OFTEN HAVE YOU USED YOUR RESCUE INHALER OR NEBULIZER MEDICATION (SUCH AS ALBUTEROL): ONCE A WEEK OR LESS
QUESTION_5 LAST FOUR WEEKS HOW WOULD YOU RATE YOUR ASTHMA CONTROL: WELL CONTROLLED
ACT_TOTALSCORE: 20
QUESTION_1 LAST FOUR WEEKS HOW MUCH OF THE TIME DID YOUR ASTHMA KEEP YOU FROM GETTING AS MUCH DONE AT WORK, SCHOOL OR AT HOME: A LITTLE OF THE TIME
QUESTION_2 LAST FOUR WEEKS HOW OFTEN HAVE YOU HAD SHORTNESS OF BREATH: ONCE OR TWICE A WEEK
ACT_TOTALSCORE: 20

## 2024-01-09 ASSESSMENT — PATIENT HEALTH QUESTIONNAIRE - PHQ9
10. IF YOU CHECKED OFF ANY PROBLEMS, HOW DIFFICULT HAVE THESE PROBLEMS MADE IT FOR YOU TO DO YOUR WORK, TAKE CARE OF THINGS AT HOME, OR GET ALONG WITH OTHER PEOPLE: SOMEWHAT DIFFICULT
SUM OF ALL RESPONSES TO PHQ QUESTIONS 1-9: 4
SUM OF ALL RESPONSES TO PHQ QUESTIONS 1-9: 4

## 2024-01-09 NOTE — PROGRESS NOTES
Josué is a 43 year old who is being evaluated via a billable video visit.      How would you like to obtain your AVS? MyChart  If the video visit is dropped, the invitation should be resent by: Text to cell phone: [phone]  Will anyone else be joining your video visit? No      Assessment & Plan     KRISTINA (generalized anxiety disorder)  Chronic issue, stable.  Controlled on current plan.  Continue Prozac and healthy habits.  Medication refilled.  - LORazepam (ATIVAN) 1 MG tablet; Take 1 tablet (1 mg) by mouth daily as needed for anxiety    Type 2 diabetes mellitus with other circulatory complication, without long-term current use of insulin (H)  -Previously uncontrolled.  Concern for this at this time.  Not losing weight.  Increasing dose of Trulicity to 3 mg due to wanting to improve diabetes, weight loss.  Also patient does not have availability of medication and [place].  So trying higher dose.    Discussed needing exam.  Due for physical exam.  Unable to do any further virtual visits without face-to-face visit.  Patient will try to schedule and self Metro this week.  Discussed doing any provider.    - CBC with Platelets & Differential; Future  - Lipid panel reflex to direct LDL Non-fasting; Future  - Hemoglobin A1c; Future  - TSH with free T4 reflex; Future  - Comprehensive metabolic panel; Future  - Albumin Random Urine Quantitative with Creat Ratio; Future  - Dulaglutide (TRULICITY) 3 MG/0.5ML SOPN; Inject 3 mg Subcutaneous once a week    Morbid obesity due to excess calories (H)  -As above.  - Dulaglutide (TRULICITY) 3 MG/0.5ML SOPN; Inject 3 mg Subcutaneous once a week      Patient to schedule PE this week.  Patient advised for recheck of Trulicity in 3 months.  Labs ordered.  Patient agreeable to plan.  No further virtual [place] examined.               LAWSON Hernandez [facility] RINA Moses is a 43 year old, presenting for the following health issues:  Recheck  Medication and Diabetes  -Takes trulicity to manage blood sugars. Has been having a hard time finding it around the area, new Rx sent yesterday needs a PA      1/9/2024     9:44 AM   Additional Questions   Roomed by Denzel ENGLISH   Accompanied by Self       History of Present Illness       Diabetes:   Josué Franco presents for follow up of diabetes.  Josué Franco is checking home blood glucose two times daily.   Josué Franco checks blood glucose before and after meals.  Blood glucose is sometimes over 200 and never under 70. Josué Franco is aware of hypoglycemia symptoms including none.   Josué Franco is concerned about blood sugar frequently over 200.    Josué Franco is not experiencing numbness or burning in feet, excessive thirst, blurry vision, weight changes or redness, sores or blisters on feet. The patient has had a diabetic eye exam in the last 12 months. Eye exam performed on Feb 2023. [facility].        Josué Franco eats 0-1 servings of fruits and vegetables daily.Josué Franco consumes 1 sweetened beverage(s) daily.Josué Franco exercises with enough effort to increase Josué Franco's heart rate 20 to 29 minutes per day.  Josué Franco exercises with enough effort to increase Josué Franco's heart rate 3 or less days per week.   Josué Franco is taking medications regularly.     Trulicity at 1.5  Out for past 3 weeks.   In AM BG levels 140-150- and lower at bedtime.     Weight loss.   - staying pretty steady [id number]. LBS. Stable through the holidays.   Has been walking more in [place].   Here in [place] today.   Patient states the Trulicity has been working the best for him.  He had side effects on Ozempic.  He needed a prior Auth for the 0.75 pens to total dose of 1.5 mg.    Patient thinks his diabetes is under better control with the Trulicity.  He is not losing any weight.  Patient is struggling with availability of Trulicity in  [place].    Anxiety-chronic and stable.  Needs refill of lorazepam.  20 pills annually has been working well for him.  He states he is getting Prozac medications refilled and has current supplies of his other medications.            Review of Systems   Constitutional, HEENT, cardiovascular, pulmonary, gi and gu systems are negative, except as otherwise noted.      Objective           Vitals:  No vitals were obtained today due to virtual visit.    Physical Exam   GENERAL: Healthy, alert and no distress  EYES: Eyes grossly normal to inspection.  No discharge or erythema, or obvious scleral/conjunctival abnormalities.  RESP: No audible wheeze, cough, or visible cyanosis.  No visible retractions or increased work of breathing.    SKIN: Visible skin clear. No significant rash, abnormal pigmentation or lesions.  NEURO: Cranial nerves grossly intact.  Mentation and speech appropriate for age.  PSYCH: Mentation appears normal, affect normal/bright, judgement and insight intact, normal speech and appearance well-groomed.                Video-Visit Details    Type of service:  Video Visit     Originating Location (pt. Location): Home    Distant Location (provider location):  Off-site  Platform used for Video Visit: Christopher

## 2024-01-12 ENCOUNTER — OFFICE VISIT (OUTPATIENT)
Dept: FAMILY MEDICINE | Facility: CLINIC | Age: 44
End: 2024-01-12
Payer: COMMERCIAL

## 2024-01-12 VITALS
BODY MASS INDEX: 40.43 KG/M2 | OXYGEN SATURATION: 98 % | DIASTOLIC BLOOD PRESSURE: 84 MMHG | SYSTOLIC BLOOD PRESSURE: 126 MMHG | TEMPERATURE: 98.5 F | HEART RATE: 78 BPM | HEIGHT: 74 IN | WEIGHT: 315 LBS | RESPIRATION RATE: 20 BRPM

## 2024-01-12 DIAGNOSIS — E11.59 TYPE 2 DIABETES MELLITUS WITH OTHER CIRCULATORY COMPLICATION, WITHOUT LONG-TERM CURRENT USE OF INSULIN (H): ICD-10-CM

## 2024-01-12 DIAGNOSIS — Z00.00 ENCOUNTER FOR ROUTINE ADULT HEALTH EXAMINATION WITHOUT ABNORMAL FINDINGS: Primary | ICD-10-CM

## 2024-01-12 LAB
ALBUMIN SERPL BCG-MCNC: 4.1 G/DL (ref 3.5–5.2)
ALP SERPL-CCNC: 113 U/L (ref 40–150)
ALT SERPL W P-5'-P-CCNC: 28 U/L (ref 0–70)
ANION GAP SERPL CALCULATED.3IONS-SCNC: 14 MMOL/L (ref 7–15)
AST SERPL W P-5'-P-CCNC: 19 U/L (ref 0–45)
BASOPHILS # BLD AUTO: 0.1 10E3/UL (ref 0–0.2)
BASOPHILS NFR BLD AUTO: 1 %
BILIRUB SERPL-MCNC: 0.3 MG/DL
BUN SERPL-MCNC: 15.4 MG/DL (ref 6–20)
CALCIUM SERPL-MCNC: 9.3 MG/DL (ref 8.6–10)
CHLORIDE SERPL-SCNC: 101 MMOL/L (ref 98–107)
CHOLEST SERPL-MCNC: 133 MG/DL
CREAT SERPL-MCNC: 0.73 MG/DL (ref 0.51–1.17)
CREAT UR-MCNC: 219 MG/DL
DEPRECATED HCO3 PLAS-SCNC: 25 MMOL/L (ref 22–29)
EGFRCR SERPLBLD CKD-EPI 2021: >90 ML/MIN/1.73M2
EOSINOPHIL # BLD AUTO: 0.4 10E3/UL (ref 0–0.7)
EOSINOPHIL NFR BLD AUTO: 4 %
ERYTHROCYTE [DISTWIDTH] IN BLOOD BY AUTOMATED COUNT: 13 % (ref 10–15)
FASTING STATUS PATIENT QL REPORTED: YES
GLUCOSE SERPL-MCNC: 196 MG/DL (ref 70–99)
HBA1C MFR BLD: 8.2 % (ref 0–5.6)
HCT VFR BLD AUTO: 42.1 % (ref 35–53)
HDLC SERPL-MCNC: 36 MG/DL
HGB BLD-MCNC: 13.9 G/DL (ref 11.7–17.7)
IMM GRANULOCYTES # BLD: 0 10E3/UL
IMM GRANULOCYTES NFR BLD: 0 %
LDLC SERPL CALC-MCNC: 69 MG/DL
LYMPHOCYTES # BLD AUTO: 2.8 10E3/UL (ref 0.8–5.3)
LYMPHOCYTES NFR BLD AUTO: 29 %
MCH RBC QN AUTO: 28.7 PG (ref 26.5–33)
MCHC RBC AUTO-ENTMCNC: 33 G/DL (ref 31.5–36.5)
MCV RBC AUTO: 87 FL (ref 78–100)
MICROALBUMIN UR-MCNC: 32.4 MG/L
MICROALBUMIN/CREAT UR: 14.79 MG/G CR (ref 0–25)
MONOCYTES # BLD AUTO: 0.5 10E3/UL (ref 0–1.3)
MONOCYTES NFR BLD AUTO: 6 %
NEUTROPHILS # BLD AUTO: 5.9 10E3/UL (ref 1.6–8.3)
NEUTROPHILS NFR BLD AUTO: 61 %
NONHDLC SERPL-MCNC: 97 MG/DL
PLATELET # BLD AUTO: 301 10E3/UL (ref 150–450)
POTASSIUM SERPL-SCNC: 4.6 MMOL/L (ref 3.4–5.3)
PROT SERPL-MCNC: 7.7 G/DL (ref 6.4–8.3)
RBC # BLD AUTO: 4.85 10E6/UL (ref 3.8–5.9)
SODIUM SERPL-SCNC: 140 MMOL/L (ref 135–145)
TRIGL SERPL-MCNC: 142 MG/DL
TSH SERPL DL<=0.005 MIU/L-ACNC: 2.95 UIU/ML (ref 0.3–4.2)
WBC # BLD AUTO: 9.7 10E3/UL (ref 4–11)

## 2024-01-12 PROCEDURE — 84443 ASSAY THYROID STIM HORMONE: CPT | Performed by: FAMILY MEDICINE

## 2024-01-12 PROCEDURE — 99396 PREV VISIT EST AGE 40-64: CPT | Performed by: FAMILY MEDICINE

## 2024-01-12 PROCEDURE — 80053 COMPREHEN METABOLIC PANEL: CPT | Performed by: FAMILY MEDICINE

## 2024-01-12 PROCEDURE — 83036 HEMOGLOBIN GLYCOSYLATED A1C: CPT | Performed by: FAMILY MEDICINE

## 2024-01-12 PROCEDURE — 36415 COLL VENOUS BLD VENIPUNCTURE: CPT | Performed by: FAMILY MEDICINE

## 2024-01-12 PROCEDURE — 85025 COMPLETE CBC W/AUTO DIFF WBC: CPT | Performed by: FAMILY MEDICINE

## 2024-01-12 PROCEDURE — 82043 UR ALBUMIN QUANTITATIVE: CPT | Performed by: FAMILY MEDICINE

## 2024-01-12 PROCEDURE — 82570 ASSAY OF URINE CREATININE: CPT | Performed by: FAMILY MEDICINE

## 2024-01-12 PROCEDURE — 80061 LIPID PANEL: CPT | Performed by: FAMILY MEDICINE

## 2024-01-12 ASSESSMENT — PAIN SCALES - GENERAL: PAINLEVEL: NO PAIN (0)

## 2024-01-12 ASSESSMENT — ENCOUNTER SYMPTOMS
WEAKNESS: 0
HEADACHES: 0
ABDOMINAL PAIN: 0
SHORTNESS OF BREATH: 0
NAUSEA: 0
DIZZINESS: 0
SORE THROAT: 0
EYE PAIN: 0
HEARTBURN: 0
FREQUENCY: 0
DIARRHEA: 0
JOINT SWELLING: 0
CHILLS: 0
DYSURIA: 0
CONSTIPATION: 0
ARTHRALGIAS: 0
NERVOUS/ANXIOUS: 0
HEMATOCHEZIA: 0
PARESTHESIAS: 0
PALPITATIONS: 0
COUGH: 0
HEMATURIA: 0
FEVER: 0
MYALGIAS: 0
BREAST MASS: 0

## 2024-01-12 NOTE — RESULT ENCOUNTER NOTE
Diabetes is not well-controlled.  Please make exercise and nutrition changes along with the medication changes.  I see you are seeing primary care today.  Other labs holding in good range.  Reach out with questions.  Recommend 3 month check with labs.    Sincerely,   Belkis Chadwick DNP

## 2024-01-12 NOTE — PROGRESS NOTES
SUBJECTIVE:   Josué is a 43 year old, presenting for the following:    Patient is a 43-year-old male who has type 2 diabetes hypertension obesity hyperlipidemia.   -Continue home medication obstructive sleep apnea anxiety depression here for his annual physical.  He has a provider out of the [facility] [place] [facility] but was unable to get in to see his provider.  He actually lives in [place] but comes back to [place] for his physical and medication refills.  He is here today for lab work and also for recheck of his diabetes.  His provider had ordered future labs for him.  He is caught up with his vaccination.  Reminded patient about eye exam.  He had a colonoscopy /EGD because he was having more acid reflux symptoms.   Patient reports that he had a Nissen fundoplication in his twenties and the procedure was to check for worsening acid reflux symptoms. Patient states he is not in need of refills of his medication at present.  Physical (General physical exam.  He follows with a different MD for his medication who does Virtual Visit's.  Needing an in person visit and to get labs done.), Medication issue (He is using the Trulicity injections.  Waiting to get the 1.5 mg pen from the Pharmacy.  He will increasing to the 3 mg pen once that is available at the Pharmacy.  ), Blood Draw (He is fasting today for labs.  Needing his diabetic labs and iron level checked for his medications. /His Provider has future orders placed for labs.), and Imm/Inj (States he is UTD with his shots.)        1/12/2024    10:20 AM   Additional Questions   Roomed by Lashell Snyder CMA       Healthy Habits:     Getting at least 3 servings of Calcium per day:  NO    Bi-annual eye exam:  Yes    Dental care twice a year:  NO    Sleep apnea or symptoms of sleep apnea:  Sleep apnea    Diet:  Diabetic    Frequency of exercise:  1 day/week    Duration of exercise:  15-30 minutes    Taking medications regularly:  Yes    Medication side effects:  Not  "applicable    Additional concerns today:  No      Chief Complaint   Patient presents with     Physical     General physical exam.  He follows with a different MD for his medication who does Virtual Visit's.  Needing an in person visit and to get labs done.     Medication issue     He is using the Trulicity injections.  Waiting to get the 1.5 mg pen from the Pharmacy.  He will increasing to the 3 mg pen once that is available at the Pharmacy.       Blood Draw     He is fasting today for labs.  Needing his diabetic labs and iron level checked for his medications.   His Provider has future orders placed for labs.     Imm/Inj     States he is UTD with his shots.               Social History     Tobacco Use     Smoking status: Never     Smokeless tobacco: Never   Substance Use Topics     Alcohol use: No     Comment: Very moderate 0-1/week             1/12/2024    10:16 AM   Alcohol Use   Prescreen: >3 drinks/day or >7 drinks/week? No       Last PSA: No results found for: \"PSA\"    Reviewed orders with patient. Reviewed health maintenance and updated orders accordingly - Yes  Lab work is in process  Labs reviewed in EPIC  BP Readings from Last 3 Encounters:   01/12/24 126/84   05/24/23 135/79   05/24/23 (!) 134/90    Wt Readings from Last 3 Encounters:   01/12/24 (!) 157.4 kg (347 lb)   05/24/23 (!) 158.1 kg (348 lb 8 oz)   11/22/21 (!) 160.3 kg (353 lb 6.4 oz)                  Patient Active Problem List   Diagnosis     INTERMITTENT ASTHMA      Oligospermia     Testicular hypofunction     KRISTINA (generalized anxiety disorder)     BMI over 45     Essential hypertension     ADRIANNA (obstructive sleep apnea)- severe (AHI 82)     Type 2 diabetes mellitus with other circulatory complication, without long-term current use of insulin (H)     Hyperlipidemia LDL goal <100     Past Surgical History:   Procedure Laterality Date     CHOLECYSTECTOMY  1997     CYSTOURETEROSCOPY, WITH LITHOTRIPSY USING NILESH 120P LASER AND URETERAL STENT " INSERTION Right 11/22/2021    Procedure: CYSTOURETEROSCOPY, WITH LITHOTRIPSY USING NILESH 120P LASER AND URETERAL STENT INSERTION;  Surgeon: Bertrand Roy MD;  [place] [facility] [place]     LITHOTRIPSY  7/08    Lithotrypsy     NISSEN FUNDOPLICATION  2001    Nissen Fundiplication       Social History     Tobacco Use     Smoking status: Never     Smokeless tobacco: Never   Substance Use Topics     Alcohol use: No     Comment: Very moderate 0-1/week     Family History   Problem Relation Age of Onset     Coronary Artery Disease Father      Diabetes No family hx of      Hypertension No family hx of      Colon Cancer No family hx of          Current Outpatient Medications   Medication Sig Dispense Refill     albuterol (PROAIR HFA/PROVENTIL HFA/VENTOLIN HFA) 108 (90 Base) MCG/ACT inhaler Inhale 2 puffs into the lungs every 4 hours as needed for shortness of breath / dyspnea 1 Inhaler 5     albuterol (PROVENTIL) (2.5 MG/3ML) 0.083% neb solution INHALE CONTENTS OF 1 VIAL VIA NEBULIZER EVERY 4 HOURS AS NEEDED FOR WHEEZING. 1 Box 1     blood glucose (ACCU-CHEK GUIDE) test strip Use to test blood sugar one to two times daily or as directed. 100 strip 11     blood glucose monitoring (ACCU-CHEK FASTCLIX) lancets Use to test blood sugar 1-2 times daily or as directed. 100 each 11     FLUoxetine (PROZAC) 20 MG capsule Take 3 capsules (60 mg) by mouth daily TAKE 3 CAPSULES BY MOUTH EVERY  capsule 3     LORazepam (ATIVAN) 1 MG tablet Take 1 tablet (1 mg) by mouth daily as needed for anxiety 20 tablet 0     metFORMIN (GLUCOPHAGE XR) 500 MG 24 hr tablet Take 4 tablets (2,000 mg) by mouth daily (with dinner) 360 tablet 1     metoprolol succinate ER (TOPROL XL) 50 MG 24 hr tablet TAKE 1 TABLET BY MOUTH EVERY DAY 90 tablet 0     omeprazole (PRILOSEC) 40 MG DR capsule Take 1 capsule (40 mg) by mouth daily 14 capsule 0     order for DME CPAP 11 cm 1 Units 1     Allergies   Allergen Reactions     No Known Allergies        Reviewed  "and updated as needed this visit by clinical staff   Tobacco  Allergies  Meds  Problems             Reviewed and updated as needed this visit by Provider     Meds  Problems            Past Medical History:   Diagnosis Date     Asthma, persistent, severity to be determined      Diabetes mellitus type II, controlled (H)     with other complications     HTN (hypertension)      Morbid obesity (H)      Nephrolithiasis      ADRIANNA on CPAP      PONV (postoperative nausea and vomiting)       Past Surgical History:   Procedure Laterality Date     CHOLECYSTECTOMY  1997     CYSTOURETEROSCOPY, WITH LITHOTRIPSY USING NILESH 120P LASER AND URETERAL STENT INSERTION Right 11/22/2021    Procedure: CYSTOURETEROSCOPY, WITH LITHOTRIPSY USING NILESH 120P LASER AND URETERAL STENT INSERTION;  Surgeon: Bertrand Roy MD;  Location: [facility]     LITHOTRIPSY  7/08    Lithotrypsy     NISSEN FUNDOPLICATION  2001    Nissen Fundiplication       Review of Systems   Constitutional:  Negative for chills and fever.   HENT:  Negative for congestion, ear pain, hearing loss and sore throat.    Eyes:  Negative for pain.   Respiratory:  Negative for cough and shortness of breath.    Cardiovascular:  Negative for chest pain and palpitations.   Gastrointestinal:  Negative for abdominal pain, constipation, diarrhea and nausea.   Genitourinary:  Negative for dysuria, frequency, genital sores, hematuria and urgency.   Musculoskeletal:  Negative for arthralgias, joint swelling and myalgias.   Skin:  Negative for rash.   Neurological:  Negative for dizziness, weakness and headaches.   Psychiatric/Behavioral:  The patient is not nervous/anxious.          OBJECTIVE:   /84   Pulse 78   Temp 98.5  F (36.9  C) (Tympanic)   Resp 20   Ht 1.873 m (6' 1.75\")   Wt (!) 157.4 kg (347 lb)   SpO2 98%   BMI 44.85 kg/m      Physical Exam  GENERAL: healthy, alert and no distress  EYES: Eyes grossly normal to inspection, PERRL and conjunctivae and " "sclerae normal  HENT: ear canals and TM's normal, nose and mouth without ulcers or lesions  NECK: no adenopathy, no asymmetry, masses, or scars and thyroid normal to palpation  RESP: lungs clear to auscultation - no rales, rhonchi or wheezes  CV: regular rate and rhythm, normal S1 S2, no S3 or S4, no murmur, click or rub, no peripheral edema and peripheral pulses strong  ABDOMEN: soft, nontender, no hepatosplenomegaly, no masses and bowel sounds normal  MS: no gross musculoskeletal defects noted, no edema  SKIN: no suspicious lesions or rashes  BACK: no CVA tenderness, no paralumbar tenderness  PSYCH: mentation appears normal, affect normal/bright    Diagnostic Test Results:  Pending     ASSESSMENT/PLAN:   Josué was seen today for physical, medication issue, blood draw and imm/inj.    Diagnoses and all orders for this visit:    Encounter for routine adult health examination without abnormal findings        43 yr old male here for his routine annual visit. He has type II diabetes, hypertension , obesity and ADRIANNA         Patient will be having labs done today.          Encouraged healthy lifestyle.   Type 2 diabetes mellitus with other circulatory complication, without long-term current use of insulin (H)  -     CBC with Platelets & Differential  -     Lipid panel reflex to direct LDL Non-fasting  -     Hemoglobin A1c  -     TSH with free T4 reflex  -     Comprehensive metabolic panel  -     Albumin Random Urine Quantitative with Creat Ratio        Patient has been advised of split billing requirements and indicates understanding: Yes      COUNSELING:   Reviewed preventive health counseling, as reflected in patient instructions       Regular exercise       Healthy diet/nutrition      BMI:   Estimated body mass index is 44.85 kg/m  as calculated from the following:    Height as of this encounter: 1.873 m (6' 1.75\").    Weight as of this encounter: 157.4 kg (347 lb).   Weight management plan: Discussed healthy diet and " exercise guidelines      Josué Franco reports that Josué Franco has never smoked. Josué Franco has never used smokeless tobacco.            Dennys Farley MD  [facility]  Answers submitted by the patient for this visit:  Annual Preventive Visit (Submitted on 1/12/2024)  Chief Complaint: Annual Exam:  Blood in stool: No  heartburn: No  peripheral edema: No  mood changes: No  Skin sensation changes: No  pelvic pain: No  vaginal bleeding: No  vaginal discharge: No  tenderness: No  breast mass: No  breast discharge: No  impotence: No  penile discharge: No

## 2024-01-18 DIAGNOSIS — E11.59 TYPE 2 DIABETES MELLITUS WITH OTHER CIRCULATORY COMPLICATION, WITHOUT LONG-TERM CURRENT USE OF INSULIN (H): ICD-10-CM

## 2024-01-19 ENCOUNTER — MYC MEDICAL ADVICE (OUTPATIENT)
Dept: FAMILY MEDICINE | Facility: CLINIC | Age: 44
End: 2024-01-19
Payer: COMMERCIAL

## 2024-01-19 ENCOUNTER — TELEPHONE (OUTPATIENT)
Dept: FAMILY MEDICINE | Facility: CLINIC | Age: 44
End: 2024-01-19
Payer: COMMERCIAL

## 2024-01-19 DIAGNOSIS — E66.01 MORBID OBESITY DUE TO EXCESS CALORIES (H): ICD-10-CM

## 2024-01-19 DIAGNOSIS — E11.59 TYPE 2 DIABETES MELLITUS WITH OTHER CIRCULATORY COMPLICATION, WITHOUT LONG-TERM CURRENT USE OF INSULIN (H): ICD-10-CM

## 2024-01-19 RX ORDER — DULAGLUTIDE 3 MG/.5ML
3 INJECTION, SOLUTION SUBCUTANEOUS WEEKLY
Qty: 6 ML | Refills: 0 | Status: CANCELLED | OUTPATIENT
Start: 2024-01-19

## 2024-01-19 RX ORDER — DULAGLUTIDE 1.5 MG/.5ML
1.5 INJECTION, SOLUTION SUBCUTANEOUS
Qty: 6 ML | Refills: 0 | Status: SHIPPED | OUTPATIENT
Start: 2024-01-19 | End: 2024-01-22 | Stop reason: DRUGHIGH

## 2024-01-19 NOTE — TELEPHONE ENCOUNTER
Medication pending and pharmacy entered. Routing to refill pool.    Deana Juarez CMA ([facility])

## 2024-01-19 NOTE — TELEPHONE ENCOUNTER
Patient called in with a medication question.    He states he was supposed to get a fill for Trulicity 3 mg.     He states Belkis Chadwick PA-C sent in the old dose, 1.5 mg yesterday.    Patient is asking for the 3 mg to be sent today.     Please review and advise.     Mely Lambert, KANCHANN, RN    
RN called patient to clarify which pharmacy patient wanted he wanted. Patient was to continue 1.5mg Trulicity until a pharmacy was found with stock of 3mg Trulicity.    Patient found pharmacy, medication attached with correct pharm location to fill 3mg of Trulicity.    Benita Rodriguez RN  [facility] - Registered Nurse  Clinic Triage [place]   January 19, 2024        
no

## 2024-01-22 NOTE — TELEPHONE ENCOUNTER
Duplicate request. See telephone encounter 1/19/24. Request has already been routed to PCP for review.     Routing to provider due to patient request for alternative medication if unable to provide 3mg prescription. Medication pended in 1/19/24 telephone encounter.     KANCHAN AbrahamN, RN  [facility] ~ Registered Nurse  Clinic Triage ~ [place] [facility]  January 22, 2024

## 2024-01-29 ENCOUNTER — MYC REFILL (OUTPATIENT)
Dept: FAMILY MEDICINE | Facility: CLINIC | Age: 44
End: 2024-01-29
Payer: COMMERCIAL

## 2024-01-29 DIAGNOSIS — F41.1 GAD (GENERALIZED ANXIETY DISORDER): Chronic | ICD-10-CM

## 2024-01-30 ENCOUNTER — MYC MEDICAL ADVICE (OUTPATIENT)
Dept: FAMILY MEDICINE | Facility: CLINIC | Age: 44
End: 2024-01-30
Payer: COMMERCIAL

## 2024-01-30 DIAGNOSIS — F41.1 GAD (GENERALIZED ANXIETY DISORDER): Chronic | ICD-10-CM

## 2024-01-30 RX ORDER — LORAZEPAM 1 MG/1
1 TABLET ORAL DAILY PRN
Qty: 20 TABLET | Refills: 0 | OUTPATIENT
Start: 2024-01-30

## 2024-01-30 RX ORDER — LORAZEPAM 1 MG/1
1 TABLET ORAL DAILY PRN
Qty: 20 TABLET | Refills: 0 | Status: SHIPPED | OUTPATIENT
Start: 2024-01-30

## 2024-01-30 RX ORDER — LORAZEPAM 1 MG/1
1 TABLET ORAL DAILY PRN
Qty: 20 TABLET | Refills: 0 | Status: CANCELLED | OUTPATIENT
Start: 2024-01-30

## 2024-03-01 DIAGNOSIS — I10 ESSENTIAL HYPERTENSION: ICD-10-CM

## 2024-03-01 RX ORDER — METOPROLOL SUCCINATE 50 MG/1
50 TABLET, EXTENDED RELEASE ORAL DAILY
Qty: 90 TABLET | Refills: 0 | Status: SHIPPED | OUTPATIENT
Start: 2024-03-01 | End: 2024-05-30

## 2024-03-23 DIAGNOSIS — E11.59 TYPE 2 DIABETES MELLITUS WITH OTHER CIRCULATORY COMPLICATION, WITHOUT LONG-TERM CURRENT USE OF INSULIN (H): ICD-10-CM

## 2024-03-25 RX ORDER — METFORMIN HCL 500 MG
2000 TABLET, EXTENDED RELEASE 24 HR ORAL
Qty: 360 TABLET | Refills: 1 | Status: SHIPPED | OUTPATIENT
Start: 2024-03-25 | End: 2024-08-29

## 2024-04-01 DIAGNOSIS — E11.59 TYPE 2 DIABETES MELLITUS WITH OTHER CIRCULATORY COMPLICATION, WITHOUT LONG-TERM CURRENT USE OF INSULIN (H): ICD-10-CM

## 2024-04-01 DIAGNOSIS — E66.01 MORBID OBESITY DUE TO EXCESS CALORIES (H): ICD-10-CM

## 2024-04-01 RX ORDER — DULAGLUTIDE 3 MG/.5ML
INJECTION, SOLUTION SUBCUTANEOUS
Qty: 6 ML | Refills: 0 | Status: SHIPPED | OUTPATIENT
Start: 2024-04-01 | End: 2024-06-01

## 2024-04-24 DIAGNOSIS — F41.1 GAD (GENERALIZED ANXIETY DISORDER): Chronic | ICD-10-CM

## 2024-05-30 DIAGNOSIS — I10 ESSENTIAL HYPERTENSION: ICD-10-CM

## 2024-05-30 DIAGNOSIS — E66.01 MORBID OBESITY DUE TO EXCESS CALORIES (H): ICD-10-CM

## 2024-05-30 DIAGNOSIS — E11.59 TYPE 2 DIABETES MELLITUS WITH OTHER CIRCULATORY COMPLICATION, WITHOUT LONG-TERM CURRENT USE OF INSULIN (H): ICD-10-CM

## 2024-05-30 RX ORDER — DULAGLUTIDE 3 MG/.5ML
INJECTION, SOLUTION SUBCUTANEOUS
Qty: 6 ML | Refills: 0 | OUTPATIENT
Start: 2024-05-30

## 2024-05-30 RX ORDER — METOPROLOL SUCCINATE 50 MG/1
50 TABLET, EXTENDED RELEASE ORAL DAILY
Qty: 90 TABLET | Refills: 1 | Status: SHIPPED | OUTPATIENT
Start: 2024-05-30 | End: 2024-06-26

## 2024-05-30 NOTE — TELEPHONE ENCOUNTER
Per patient every pharmacy he has tried is out of the trulicity   [facility] has the prescription, but he had remaining refills on previous prescription but couldn't get it filled due to stock  Is there a way he can get a 30 day supply he will not be back in [place] until next month.

## 2024-05-30 NOTE — TELEPHONE ENCOUNTER
Call pt. Unable to fill. Overdue for DM visit/labs. Will need to schedule with open provider.   LAWSON Hernandez CNP

## 2024-06-01 RX ORDER — DULAGLUTIDE 3 MG/.5ML
3 INJECTION, SOLUTION SUBCUTANEOUS WEEKLY
Qty: 2 ML | Refills: 0 | Status: SHIPPED | OUTPATIENT
Start: 2024-06-01 | End: 2024-06-26

## 2024-06-23 DIAGNOSIS — E11.59 TYPE 2 DIABETES MELLITUS WITH OTHER CIRCULATORY COMPLICATION, WITHOUT LONG-TERM CURRENT USE OF INSULIN (H): ICD-10-CM

## 2024-06-24 RX ORDER — METFORMIN HCL 500 MG
2000 TABLET, EXTENDED RELEASE 24 HR ORAL
Qty: 360 TABLET | Refills: 1 | OUTPATIENT
Start: 2024-06-24

## 2024-06-26 ENCOUNTER — MYC MEDICAL ADVICE (OUTPATIENT)
Dept: FAMILY MEDICINE | Facility: CLINIC | Age: 44
End: 2024-06-26
Payer: COMMERCIAL

## 2024-06-26 ENCOUNTER — MYC REFILL (OUTPATIENT)
Dept: FAMILY MEDICINE | Facility: CLINIC | Age: 44
End: 2024-06-26
Payer: COMMERCIAL

## 2024-06-26 DIAGNOSIS — E11.59 TYPE 2 DIABETES MELLITUS WITH OTHER CIRCULATORY COMPLICATION, WITHOUT LONG-TERM CURRENT USE OF INSULIN (H): ICD-10-CM

## 2024-06-26 DIAGNOSIS — E66.01 MORBID OBESITY DUE TO EXCESS CALORIES (H): ICD-10-CM

## 2024-06-26 DIAGNOSIS — I10 ESSENTIAL HYPERTENSION: ICD-10-CM

## 2024-06-26 RX ORDER — METOPROLOL SUCCINATE 50 MG/1
50 TABLET, EXTENDED RELEASE ORAL DAILY
Qty: 90 TABLET | Refills: 0 | Status: SHIPPED | OUTPATIENT
Start: 2024-06-26 | End: 2024-08-29

## 2024-06-26 RX ORDER — DULAGLUTIDE 3 MG/.5ML
INJECTION, SOLUTION SUBCUTANEOUS
Qty: 6 ML | Refills: 0 | OUTPATIENT
Start: 2024-06-26

## 2024-06-26 RX ORDER — METOPROLOL SUCCINATE 50 MG/1
50 TABLET, EXTENDED RELEASE ORAL DAILY
Qty: 90 TABLET | Refills: 1 | Status: CANCELLED | OUTPATIENT
Start: 2024-06-26

## 2024-07-21 DIAGNOSIS — F41.1 GAD (GENERALIZED ANXIETY DISORDER): Chronic | ICD-10-CM

## 2024-07-22 DIAGNOSIS — J45.21 MILD INTERMITTENT ASTHMA WITH EXACERBATION: ICD-10-CM

## 2024-07-22 RX ORDER — ALBUTEROL SULFATE 0.83 MG/ML
SOLUTION RESPIRATORY (INHALATION)
Status: CANCELLED | OUTPATIENT
Start: 2024-07-22

## 2024-07-23 ENCOUNTER — MYC MEDICAL ADVICE (OUTPATIENT)
Dept: FAMILY MEDICINE | Facility: CLINIC | Age: 44
End: 2024-07-23
Payer: COMMERCIAL

## 2024-07-23 DIAGNOSIS — J45.21 MILD INTERMITTENT ASTHMA WITH EXACERBATION: ICD-10-CM

## 2024-07-23 RX ORDER — ALBUTEROL SULFATE 0.83 MG/ML
SOLUTION RESPIRATORY (INHALATION)
Qty: 3 ML | Refills: 0 | Status: SHIPPED | OUTPATIENT
Start: 2024-07-23

## 2024-07-23 NOTE — TELEPHONE ENCOUNTER
Patient calling back. He reports he currently has a cold and has caused his asthma to flare.   He does experience some wheezing with activity. Inhaler is not helping and needs the nebulizer to use as needed.     Please send follow up message if/when this is refilled.    Loli HEMPHILLN, RN

## 2024-08-06 DIAGNOSIS — E66.01 MORBID OBESITY DUE TO EXCESS CALORIES (H): ICD-10-CM

## 2024-08-06 DIAGNOSIS — E11.59 TYPE 2 DIABETES MELLITUS WITH OTHER CIRCULATORY COMPLICATION, WITHOUT LONG-TERM CURRENT USE OF INSULIN (H): ICD-10-CM

## 2024-08-06 RX ORDER — DULAGLUTIDE 3 MG/.5ML
3 INJECTION, SOLUTION SUBCUTANEOUS WEEKLY
Qty: 2 ML | Refills: 0 | OUTPATIENT
Start: 2024-08-06

## 2024-08-15 DIAGNOSIS — E66.01 MORBID OBESITY DUE TO EXCESS CALORIES (H): ICD-10-CM

## 2024-08-15 DIAGNOSIS — E11.59 TYPE 2 DIABETES MELLITUS WITH OTHER CIRCULATORY COMPLICATION, WITHOUT LONG-TERM CURRENT USE OF INSULIN (H): ICD-10-CM

## 2024-08-15 RX ORDER — DULAGLUTIDE 3 MG/.5ML
INJECTION, SOLUTION SUBCUTANEOUS
Qty: 2 ML | Refills: 0 | OUTPATIENT
Start: 2024-08-15

## 2024-08-27 ENCOUNTER — LAB (OUTPATIENT)
Dept: LAB | Facility: CLINIC | Age: 44
End: 2024-08-27
Payer: COMMERCIAL

## 2024-08-27 DIAGNOSIS — E11.59 TYPE 2 DIABETES MELLITUS WITH OTHER CIRCULATORY COMPLICATION, WITHOUT LONG-TERM CURRENT USE OF INSULIN (H): Primary | ICD-10-CM

## 2024-08-27 LAB — HBA1C MFR BLD: 8.2 % (ref 0–5.6)

## 2024-08-27 PROCEDURE — 36415 COLL VENOUS BLD VENIPUNCTURE: CPT

## 2024-08-27 PROCEDURE — 83036 HEMOGLOBIN GLYCOSYLATED A1C: CPT

## 2024-08-29 ENCOUNTER — VIRTUAL VISIT (OUTPATIENT)
Dept: FAMILY MEDICINE | Facility: CLINIC | Age: 44
End: 2024-08-29
Payer: COMMERCIAL

## 2024-08-29 DIAGNOSIS — E66.01 MORBID OBESITY DUE TO EXCESS CALORIES (H): ICD-10-CM

## 2024-08-29 DIAGNOSIS — F41.1 GAD (GENERALIZED ANXIETY DISORDER): Chronic | ICD-10-CM

## 2024-08-29 DIAGNOSIS — I10 ESSENTIAL HYPERTENSION: ICD-10-CM

## 2024-08-29 DIAGNOSIS — E11.59 TYPE 2 DIABETES MELLITUS WITH OTHER CIRCULATORY COMPLICATION, WITHOUT LONG-TERM CURRENT USE OF INSULIN (H): Primary | ICD-10-CM

## 2024-08-29 PROCEDURE — 99214 OFFICE O/P EST MOD 30 MIN: CPT | Mod: 95 | Performed by: FAMILY MEDICINE

## 2024-08-29 RX ORDER — OMEPRAZOLE 40 MG/1
40 CAPSULE, DELAYED RELEASE ORAL DAILY
Qty: 90 CAPSULE | Refills: 0 | Status: SHIPPED | OUTPATIENT
Start: 2024-08-29

## 2024-08-29 RX ORDER — METFORMIN HCL 500 MG
2000 TABLET, EXTENDED RELEASE 24 HR ORAL
Qty: 360 TABLET | Refills: 1 | Status: SHIPPED | OUTPATIENT
Start: 2024-08-29

## 2024-08-29 RX ORDER — METOPROLOL SUCCINATE 50 MG/1
50 TABLET, EXTENDED RELEASE ORAL DAILY
Qty: 90 TABLET | Refills: 0 | Status: SHIPPED | OUTPATIENT
Start: 2024-08-29

## 2024-08-29 ASSESSMENT — ASTHMA QUESTIONNAIRES
QUESTION_5 LAST FOUR WEEKS HOW WOULD YOU RATE YOUR ASTHMA CONTROL: WELL CONTROLLED
QUESTION_3 LAST FOUR WEEKS HOW OFTEN DID YOUR ASTHMA SYMPTOMS (WHEEZING, COUGHING, SHORTNESS OF BREATH, CHEST TIGHTNESS OR PAIN) WAKE YOU UP AT NIGHT OR EARLIER THAN USUAL IN THE MORNING: ONCE A WEEK
QUESTION_4 LAST FOUR WEEKS HOW OFTEN HAVE YOU USED YOUR RESCUE INHALER OR NEBULIZER MEDICATION (SUCH AS ALBUTEROL): TWO OR THREE TIMES PER WEEK
QUESTION_2 LAST FOUR WEEKS HOW OFTEN HAVE YOU HAD SHORTNESS OF BREATH: THREE TO SIX TIMES A WEEK
ACT_TOTALSCORE: 16
ACT_TOTALSCORE: 16
QUESTION_1 LAST FOUR WEEKS HOW MUCH OF THE TIME DID YOUR ASTHMA KEEP YOU FROM GETTING AS MUCH DONE AT WORK, SCHOOL OR AT HOME: SOME OF THE TIME

## 2024-08-29 ASSESSMENT — PATIENT HEALTH QUESTIONNAIRE - PHQ9
SUM OF ALL RESPONSES TO PHQ QUESTIONS 1-9: 5
SUM OF ALL RESPONSES TO PHQ QUESTIONS 1-9: 5
10. IF YOU CHECKED OFF ANY PROBLEMS, HOW DIFFICULT HAVE THESE PROBLEMS MADE IT FOR YOU TO DO YOUR WORK, TAKE CARE OF THINGS AT HOME, OR GET ALONG WITH OTHER PEOPLE: NOT DIFFICULT AT ALL

## 2024-08-29 NOTE — PROGRESS NOTES
Patient completed E-Check in. Questionnaires blown in and patient checked in without being called.     Josué is a 44 year old who is being evaluated via a billable video visit.    How would you like to obtain your AVS? MyChart  If the video visit is dropped, the invitation should be resent by: Text to cell phone: [phone]  Will anyone else be joining your video visit? No    Assessment & Plan   1. Type 2 diabetes mellitus with other circulatory complication, without long-term current use of insulin (H)  Given Trulicity shortage, will trial switching and ramping up Mounjaro. Follow-up within 2 months to make further adjustments if tolerated. A1c in 3 months. Refilled metformin. Follow-up with PCP.  - tirzepatide (MOUNJARO) 2.5 MG/0.5ML pen; Inject 2.5 mg subcutaneously every 7 days.  Dispense: 2 mL; Refill: 0  - tirzepatide (MOUNJARO) 5 MG/0.5ML pen; Inject 5 mg subcutaneously every 7 days. Do not start before September 26, 2024.  Dispense: 2 mL; Refill: 0  - Hemoglobin A1c; Future  - PRIMARY CARE FOLLOW-UP SCHEDULING; Future  - metFORMIN (GLUCOPHAGE XR) 500 MG 24 hr tablet; Take 4 tablets (2,000 mg) by mouth daily (with dinner).  Dispense: 360 tablet; Refill: 1    2. Morbid obesity due to excess calories (H)  GERD symptoms work on control with omeprazole. Switch to mounjaro above.  - omeprazole (PRILOSEC) 40 MG DR capsule; Take 1 capsule (40 mg) by mouth daily.  Dispense: 90 capsule; Refill: 0    3. KRISTINA (generalized anxiety disorder)  Chronic stable condition.   Medications refilled.  No SI, or significant side effects.  Follow-up in 6 months. Sooner if worsening of symptoms.  Patient will alert our office if they have any medication changes, or they start taking supplements not prescribed by our office.        1/11/2022     7:12 AM 7/21/2022     7:53 PM 1/9/2024     9:35 AM   KRISTINA-7 SCORE   Total Score  4 (minimal anxiety) 3 (minimal anxiety)   Total Score 4 4 3     Today's Orders:  - FLUoxetine (PROZAC) 20 MG  capsule; Take 3 capsules (60 mg) by mouth daily.  Dispense: 270 capsule; Refill: 1    4. Essential hypertension  Controlled. Refills given.  - metoprolol succinate ER (TOPROL XL) 50 MG 24 hr tablet; Take 1 tablet (50 mg) by mouth daily.  Dispense: 90 tablet; Refill: 0       Follow-up Visit   Expected date:  Nov 29, 2024 (Approximate)      Follow Up Appointment Details:     Follow-up with whom?: Other Primary Care Services    Follow-Up for what?: Lab Visit    How?: In Person    Is this an as-needed follow-up?: No                   Rosaloi Rich MD  [facility]    Disclaimer: This note consists of symbols derived from keyboarding, dictation and/or voice recognition software. As a result, there may be errors in the script that have gone undetected. Please consider this when interpreting information found in this chart.    Subjective   Josué is a 44 year old, presenting for the following health issues:  Diabetes        8/29/2024    10:07 AM   Additional Questions   Roomed by Meghan HERNANDEZ   Accompanied by None         8/29/2024    10:07 AM   Patient Reported Additional Medications   Patient reports taking the following new medications NA       Via the Health Maintenance questionnaire, the patient has reported the following services have been completed -Eye Exam: [facility] 2023-11-08, this information has been sent to the abstraction team.    History of Present Illness       Diabetes:   Josué presents for follow up of diabetes.  Josué is checking home blood glucose one time daily.   Josué checks blood glucose before meals.  Blood glucose is never over 200 and never under 70.  When Josué's blood glucose is low, the patient is asymptomatic for confusion, blurred vision, lethargy and reports not feeling dizzy, shaky, or weak.  Josué is concerned about other.    Josué is not experiencing numbness or burning in feet, excessive thirst, blurry vision, weight changes or redness, sores  or blisters on feet. The patient has had a diabetic eye exam in the last 12 months. Eye exam performed on Nov 2023. Location of last eye exam [place] [facility].        Josué eats 0-1 servings of fruits and vegetables daily.Josué consumes 1 sweetened beverage(s) daily.Josué exercises with enough effort to increase Josué's heart rate 9 or less minutes per day.  Josué exercises with enough effort to increase Josué's heart rate 3 or less days per week.   Josué is taking medications regularly.       Doing diabetes follow-up visit today. Ozempic was causing too much stomach upset.    Having issues finding Trulicity. Missed 3 months in the last because of shortage. Was also on prednisone recently     Pharmacist said he would have better luck with Mounjaro being in stock.    Recent A1c was 8.2.    Also on metformin.    Next Trulicity injection is due today.    Review of Systems  Constitutional, HEENT, cardiovascular, pulmonary, GI, , musculoskeletal, neuro, skin, endocrine and psych systems are negative, except as otherwise noted.      Objective           Vitals:  No vitals were obtained today due to virtual visit.    Physical Exam   GENERAL: alert and no distress  EYES: Eyes grossly normal to inspection.  No discharge or erythema, or obvious scleral/conjunctival abnormalities.  RESP: No audible wheeze, cough, or visible cyanosis.    SKIN: Visible skin clear. No significant rash, abnormal pigmentation or lesions.  NEURO: Cranial nerves grossly intact.  Mentation and speech appropriate for age.  PSYCH: Appropriate affect, tone, and pace of words     Latest Reference Range & Units 08/27/24 11:17   Hemoglobin A1C 0.0 - 5.6 % 8.2 (H)   (H): Data is abnormally high      Video-Visit Details    Type of service:  Video Visit   Originating Location (pt. Location): Home    Distant Location (provider location):  On-site  Platform used for Video Visit: Christopher  Signed Electronically by: Rosalio Rich MD

## 2024-08-30 ENCOUNTER — TELEPHONE (OUTPATIENT)
Dept: FAMILY MEDICINE | Facility: CLINIC | Age: 44
End: 2024-08-30
Payer: COMMERCIAL

## 2024-08-30 NOTE — TELEPHONE ENCOUNTER
Patient called and stated his PCP needed to review his medication refill of [facility].     PA approved    I spoke with the [place] pharmacy staff and they plan to call the [place] office asking them to place item back to stock so they may proceed with the refill of the starting dose here. You are correct insurance approved medication it was a dispensary issue.     Mychart sent to patient per request regarding issue and plan.    Benita Rodriguez RN  [facility] - Registered Nurse  Clinic Triage [facility]   August 30, 2024

## 2024-09-16 ENCOUNTER — MYC MEDICAL ADVICE (OUTPATIENT)
Dept: FAMILY MEDICINE | Facility: CLINIC | Age: 44
End: 2024-09-16
Payer: COMMERCIAL

## 2024-09-16 NOTE — TELEPHONE ENCOUNTER
Patient Quality Outreach    Patient is due for the following:   Diabetes -  A1C, Diabetic Follow-Up Visit, and Foot Exam  Asthma  -  ACT needed  Depression  -  PHQ-9 needed and Depression follow-up visit      Topic Date Due    Pneumococcal Vaccine (2 of 2 - PCV) 01/24/2021    COVID-19 Vaccine (4 - 2024-25 season) 09/01/2024       Next Steps:   Schedule a office visit for diabetic recheck around 10/29/2024  Patient was assigned appropriate questionnaire to complete  Schedule virtual visit for mood recheck around 2/29/2025.    Type of outreach:    Sent Skyword message.  Call in 1 week if not read/completed; send letter in 2 weeks if not returned/read.       Questions for provider review:    None           Deana Juarez, [facility]  Chart routed to Care Team.

## 2024-09-23 NOTE — TELEPHONE ENCOUNTER
Talked with pt, was not a good time to schedule and pt says he will call back at another time. Informed pt he can also use [employer] to schedule if that's easier for him    Mayra Ordaz MA

## 2024-10-29 DIAGNOSIS — E11.59 TYPE 2 DIABETES MELLITUS WITH OTHER CIRCULATORY COMPLICATION, WITHOUT LONG-TERM CURRENT USE OF INSULIN (H): ICD-10-CM

## 2024-10-29 RX ORDER — TIRZEPATIDE 5 MG/.5ML
INJECTION, SOLUTION SUBCUTANEOUS
OUTPATIENT
Start: 2024-10-29

## 2024-10-29 NOTE — TELEPHONE ENCOUNTER
Call pt.   Pt. Was advised to have visit in 2 months- as new on Mounjaro- no appt scheduled.   Trulicity availability issues. Uncontrolled DM.  OK to schedule- has been in [place].   If not able to have [place] visit in next 2 weeks, will not be able to refill longer and advise care in [place].

## 2024-10-29 NOTE — TELEPHONE ENCOUNTER
RN called patient and relayed message below to patient       Call pt.   Pt. Was advised to have visit in 2 months- as new on Mounjaro- no appt scheduled.   Trulicity availability issues. Uncontrolled DM.  OK to schedule- has been in [place].   If not able to have [place] visit in next 2 weeks, will not be able to refill longer and advise care in [place].         Patient stated he is in [place] and can make an appointment in a month but not prior. Rn again stated if unable to be seen with in 2 week period, to continue care where he is at.    Patient hung up on SHAKILA Joy Post JOSE  [facility] - Registered Nurse  Clinic Triage [facility]   October 29, 2024

## 2024-11-25 DIAGNOSIS — E66.01 MORBID OBESITY DUE TO EXCESS CALORIES (H): ICD-10-CM

## 2024-11-25 DIAGNOSIS — I10 ESSENTIAL HYPERTENSION: ICD-10-CM

## 2024-11-25 RX ORDER — OMEPRAZOLE 40 MG/1
40 CAPSULE, DELAYED RELEASE ORAL DAILY
Qty: 90 CAPSULE | Refills: 0 | Status: SHIPPED | OUTPATIENT
Start: 2024-11-25

## 2024-11-25 RX ORDER — METOPROLOL SUCCINATE 50 MG/1
50 TABLET, EXTENDED RELEASE ORAL DAILY
Qty: 90 TABLET | Refills: 0 | Status: SHIPPED | OUTPATIENT
Start: 2024-11-25

## 2024-12-17 ENCOUNTER — MYC MEDICAL ADVICE (OUTPATIENT)
Dept: FAMILY MEDICINE | Facility: CLINIC | Age: 44
End: 2024-12-17
Payer: COMMERCIAL

## 2024-12-17 NOTE — TELEPHONE ENCOUNTER
Patient Quality Outreach    Patient is due for the following:   Diabetes -  A1C, LDL (Fasting), Eye Exam, Microalbumin, and Foot Exam  Asthma  -  ACT needed  Physical Preventive Adult Physical,  - Due after 1/12/2025 -bmp, lipids, microalbumin, A1c      Topic Date Due    Pneumococcal Vaccine (2 of 2 - PCV) 01/24/2021    COVID-19 Vaccine (4 - 2024-25 season) 09/01/2024       Action(s) Taken:   Schedule a lab only visit for A1c recheck-due now and on or after 1/12/2025 an Adult Preventative  Patient was assigned appropriate questionnaire to complete    Type of outreach:    Sent [employer] message.  Call in 1 week if not read/completed; send letter in 2 weeks if not returned/read.     Questions for provider review:    None           Deana Juarez, [facility]  Chart routed to Care Team.

## 2024-12-18 ENCOUNTER — MYC REFILL (OUTPATIENT)
Dept: FAMILY MEDICINE | Facility: CLINIC | Age: 44
End: 2024-12-18
Payer: COMMERCIAL

## 2024-12-18 DIAGNOSIS — F41.1 GAD (GENERALIZED ANXIETY DISORDER): Chronic | ICD-10-CM

## 2024-12-18 DIAGNOSIS — E11.59 TYPE 2 DIABETES MELLITUS WITH OTHER CIRCULATORY COMPLICATION, WITHOUT LONG-TERM CURRENT USE OF INSULIN (H): ICD-10-CM

## 2024-12-19 RX ORDER — METFORMIN HYDROCHLORIDE 500 MG/1
2000 TABLET, EXTENDED RELEASE ORAL
Qty: 120 TABLET | Refills: 0 | Status: SHIPPED | OUTPATIENT
Start: 2024-12-19

## 2024-12-19 NOTE — TELEPHONE ENCOUNTER
Informed patient over the phone that he is due for a follow up for future refills.   He would prefer to call back and get this scheduled.

## 2025-01-23 DIAGNOSIS — E11.59 TYPE 2 DIABETES MELLITUS WITH OTHER CIRCULATORY COMPLICATION, WITHOUT LONG-TERM CURRENT USE OF INSULIN (H): ICD-10-CM

## 2025-01-23 DIAGNOSIS — I10 ESSENTIAL HYPERTENSION: ICD-10-CM

## 2025-01-23 RX ORDER — METFORMIN HYDROCHLORIDE 500 MG/1
2000 TABLET, EXTENDED RELEASE ORAL
Qty: 120 TABLET | Refills: 0 | OUTPATIENT
Start: 2025-01-23

## 2025-01-23 RX ORDER — METOPROLOL SUCCINATE 50 MG/1
50 TABLET, EXTENDED RELEASE ORAL DAILY
Qty: 90 TABLET | Refills: 0 | Status: SHIPPED | OUTPATIENT
Start: 2025-01-23

## 2025-03-08 ENCOUNTER — HEALTH MAINTENANCE LETTER (OUTPATIENT)
Age: 45
End: 2025-03-08

## 2025-03-22 DIAGNOSIS — F41.1 GAD (GENERALIZED ANXIETY DISORDER): Chronic | ICD-10-CM

## 2025-03-22 NOTE — LETTER
March 27, 2025      Josué Franco  [address] [place] [id number]                Hello,      Your provider has sent a narayan refill for your    FLUoxetine    You are due for a annual physical before any more refills will be sent. All further requests without an appointment will be declined.      Please schedule via CDP or call [phone].      Have a good day,      [facility]

## 2025-03-24 NOTE — TELEPHONE ENCOUNTER
Needs Annual Physical Exam and medication/chronic illness visit.   Sincerely,   Belkis Chadwick, DNP

## 2025-04-10 ENCOUNTER — TELEPHONE (OUTPATIENT)
Dept: FAMILY MEDICINE | Facility: CLINIC | Age: 45
End: 2025-04-10
Payer: COMMERCIAL

## 2025-04-10 DIAGNOSIS — F41.1 GAD (GENERALIZED ANXIETY DISORDER): Chronic | ICD-10-CM

## 2025-04-10 NOTE — TELEPHONE ENCOUNTER
Overdue for needed care. Please call to schedule. Once appointment is scheduled. Patient of Dr. Varghese

## 2025-04-10 NOTE — TELEPHONE ENCOUNTER
Left message for patient to call us back, he needs an appointment in order to get refills on his prescription.

## 2025-04-10 NOTE — TELEPHONE ENCOUNTER
Per Dr. Varghese's last note she was due for her visit in February.  Please help schedule  Anel Bond PA-C

## 2025-04-25 DIAGNOSIS — I10 ESSENTIAL HYPERTENSION: ICD-10-CM

## 2025-04-25 RX ORDER — METOPROLOL SUCCINATE 50 MG/1
50 TABLET, EXTENDED RELEASE ORAL DAILY
Qty: 90 TABLET | Refills: 0 | Status: SHIPPED | OUTPATIENT
Start: 2025-04-25

## 2025-04-25 NOTE — TELEPHONE ENCOUNTER
Please call patient.  Medication refilled but he is due for follow up appointment with PCP for diabetes and blood pressure with labs.  Lamar Chow PA-C

## 2025-06-04 ENCOUNTER — TELEPHONE (OUTPATIENT)
Dept: FAMILY MEDICINE | Facility: CLINIC | Age: 45
End: 2025-06-04
Payer: COMMERCIAL

## 2025-06-04 NOTE — TELEPHONE ENCOUNTER
Patient Quality Outreach    Patient is due for the following:   Diabetes -  Diabetic Follow-Up Visit  Asthma  -  ACT needed  Hypertension -  BP check    Action(s) Taken:   Patient goes to [facility], no outreach needed    Type of outreach:    Patient goes to the [facility], no outreach needed    Questions for provider review:    None         Amina Clarke, [facility]  Chart routed to None.

## 2025-06-12 DIAGNOSIS — E11.59 TYPE 2 DIABETES MELLITUS WITH OTHER CIRCULATORY COMPLICATION, WITHOUT LONG-TERM CURRENT USE OF INSULIN (H): ICD-10-CM

## 2025-06-12 RX ORDER — TIRZEPATIDE 5 MG/.5ML
INJECTION, SOLUTION SUBCUTANEOUS
OUTPATIENT
Start: 2025-06-12

## (undated) DEVICE — SHEATH URETERAL ACCESS NAVIGATOR HD 11/13FRX36CM M0062502220

## (undated) DEVICE — SOL WATER IRRIG 3000ML BAG 2B7117

## (undated) DEVICE — GLOVE SURG PI ULTRA TOUCH M SZ 8 LF

## (undated) DEVICE — SOL WATER IRRIG 1000ML BOTTLE 2F7114

## (undated) DEVICE — SUCTION MANIFOLD NEPTUNE 2 SYS 1 PORT 702-025-000

## (undated) DEVICE — WIRE GUIDE 0.035"X145CM AMPLATZ SUPER STIFF STR M001465230

## (undated) DEVICE — TUBING SUCTION MEDI-VAC 1/4"X20' N620A - HE

## (undated) DEVICE — GOWN XLG DISP 9545

## (undated) DEVICE — KIT ENDO FIRST STEP DISINFECTANT 200ML W/POUCH EP-4

## (undated) DEVICE — CONNECTOR URETERAL CATH 140000

## (undated) DEVICE — BASKET NITINOL TIPLESS HALO  1.5FRX120CM 554120

## (undated) DEVICE — TUBING SET THERMEDX UROLOGY SGL USE LL0006

## (undated) DEVICE — CUSTOM PACK CYSTO PREFERRED SOT5BCYHEA

## (undated) DEVICE — PREP POVIDONE-IODINE 7.5% SCRUB 4OZ BOTTLE MDS093945

## (undated) DEVICE — MAT FLOOR SURGICAL 40X38 0702140238

## (undated) DEVICE — INTRO SHEATH 8FRX45CM PINNACLE DESTINATION STR 54-84501